# Patient Record
Sex: FEMALE | Race: WHITE | NOT HISPANIC OR LATINO | URBAN - METROPOLITAN AREA
[De-identification: names, ages, dates, MRNs, and addresses within clinical notes are randomized per-mention and may not be internally consistent; named-entity substitution may affect disease eponyms.]

---

## 2018-05-18 ENCOUNTER — ON CAMPUS - OUTPATIENT (OUTPATIENT)
Dept: URBAN - METROPOLITAN AREA HOSPITAL 108 | Facility: HOSPITAL | Age: 83
End: 2018-05-18

## 2018-05-18 DIAGNOSIS — R93.8 ABNORMAL FINDINGS ON DIAGNOSTIC IMAGING OF OTHER SPECIFIED B: ICD-10-CM

## 2018-05-18 DIAGNOSIS — R07.89 OTHER CHEST PAIN: ICD-10-CM

## 2018-05-18 DIAGNOSIS — J98.11 ATELECTASIS: ICD-10-CM

## 2018-05-18 PROCEDURE — 99204 OFFICE O/P NEW MOD 45 MIN: CPT

## 2018-05-19 ENCOUNTER — ON CAMPUS - OUTPATIENT (OUTPATIENT)
Dept: URBAN - METROPOLITAN AREA HOSPITAL 108 | Facility: HOSPITAL | Age: 83
End: 2018-05-19

## 2018-05-19 DIAGNOSIS — R63.4 ABNORMAL WEIGHT LOSS: ICD-10-CM

## 2018-05-19 DIAGNOSIS — R07.89 OTHER CHEST PAIN: ICD-10-CM

## 2018-05-19 DIAGNOSIS — K29.50 UNSPECIFIED CHRONIC GASTRITIS WITHOUT BLEEDING: ICD-10-CM

## 2018-05-19 PROCEDURE — 43239 EGD BIOPSY SINGLE/MULTIPLE: CPT | Performed by: INTERNAL MEDICINE

## 2019-08-08 ENCOUNTER — OFFICE (OUTPATIENT)
Dept: URBAN - METROPOLITAN AREA CLINIC 75 | Facility: CLINIC | Age: 84
End: 2019-08-08

## 2019-08-08 VITALS
DIASTOLIC BLOOD PRESSURE: 77 MMHG | HEIGHT: 61 IN | SYSTOLIC BLOOD PRESSURE: 121 MMHG | HEART RATE: 62 BPM | WEIGHT: 108 LBS

## 2019-08-08 DIAGNOSIS — R19.4 CHANGE IN BOWEL HABIT: ICD-10-CM

## 2019-08-08 DIAGNOSIS — R19.7 DIARRHEA, UNSPECIFIED: ICD-10-CM

## 2019-08-08 DIAGNOSIS — R63.4 ABNORMAL WEIGHT LOSS: ICD-10-CM

## 2019-08-08 DIAGNOSIS — R10.9 UNSPECIFIED ABDOMINAL PAIN: ICD-10-CM

## 2019-08-08 DIAGNOSIS — R19.5 OTHER FECAL ABNORMALITIES: ICD-10-CM

## 2019-08-08 PROCEDURE — 99214 OFFICE O/P EST MOD 30 MIN: CPT | Performed by: NURSE PRACTITIONER

## 2019-08-08 RX ORDER — CHOLESTYRAMINE 4 G/9G
8 POWDER, FOR SUSPENSION ORAL
Qty: 60 | Refills: 12 | Status: COMPLETED
Start: 2019-08-08 | End: 2023-09-15

## 2019-09-29 VITALS
HEIGHT: 61 IN | HEART RATE: 60 BPM | SYSTOLIC BLOOD PRESSURE: 116 MMHG | DIASTOLIC BLOOD PRESSURE: 58 MMHG | WEIGHT: 105 LBS

## 2019-10-02 ENCOUNTER — OFFICE (OUTPATIENT)
Dept: URBAN - METROPOLITAN AREA CLINIC 75 | Facility: CLINIC | Age: 84
End: 2019-10-02

## 2019-10-02 DIAGNOSIS — K59.00 CONSTIPATION, UNSPECIFIED: ICD-10-CM

## 2019-10-02 DIAGNOSIS — I10 ESSENTIAL (PRIMARY) HYPERTENSION: ICD-10-CM

## 2019-10-02 DIAGNOSIS — R63.4 ABNORMAL WEIGHT LOSS: ICD-10-CM

## 2019-10-02 DIAGNOSIS — E87.1 HYPO-OSMOLALITY AND HYPONATREMIA: ICD-10-CM

## 2019-10-02 PROCEDURE — 99213 OFFICE O/P EST LOW 20 MIN: CPT

## 2019-12-16 ENCOUNTER — INPATIENT HOSPITAL (OUTPATIENT)
Dept: URBAN - METROPOLITAN AREA HOSPITAL 107 | Facility: HOSPITAL | Age: 84
End: 2019-12-16
Payer: COMMERCIAL

## 2019-12-16 DIAGNOSIS — R07.89 OTHER CHEST PAIN: ICD-10-CM

## 2019-12-16 DIAGNOSIS — K58.9 IRRITABLE BOWEL SYNDROME WITHOUT DIARRHEA: ICD-10-CM

## 2019-12-16 DIAGNOSIS — R10.9 UNSPECIFIED ABDOMINAL PAIN: ICD-10-CM

## 2019-12-16 DIAGNOSIS — R10.13 EPIGASTRIC PAIN: ICD-10-CM

## 2019-12-16 DIAGNOSIS — R74.8 ABNORMAL LEVELS OF OTHER SERUM ENZYMES: ICD-10-CM

## 2019-12-16 DIAGNOSIS — K85.90 ACUTE PANCREATITIS WITHOUT NECROSIS OR INFECTION, UNSPECIFIE: ICD-10-CM

## 2019-12-16 DIAGNOSIS — K80.50 CALCULUS OF BILE DUCT WITHOUT CHOLANGITIS OR CHOLECYSTITIS W: ICD-10-CM

## 2019-12-16 PROCEDURE — 99222 1ST HOSP IP/OBS MODERATE 55: CPT | Performed by: INTERNAL MEDICINE

## 2019-12-18 ENCOUNTER — INPATIENT HOSPITAL (OUTPATIENT)
Dept: URBAN - METROPOLITAN AREA HOSPITAL 107 | Facility: HOSPITAL | Age: 84
End: 2019-12-18
Payer: COMMERCIAL

## 2019-12-18 DIAGNOSIS — K80.44 CALCULUS OF BILE DUCT WITH CHRONIC CHOLECYSTITIS WITHOUT OBS: ICD-10-CM

## 2019-12-18 DIAGNOSIS — R94.5 ABNORMAL RESULTS OF LIVER FUNCTION STUDIES: ICD-10-CM

## 2019-12-18 DIAGNOSIS — R10.13 EPIGASTRIC PAIN: ICD-10-CM

## 2019-12-18 DIAGNOSIS — R10.11 RIGHT UPPER QUADRANT PAIN: ICD-10-CM

## 2019-12-18 DIAGNOSIS — K85.10 BILIARY ACUTE PANCREATITIS WITHOUT NECROSIS OR INFECTION: ICD-10-CM

## 2019-12-18 PROCEDURE — 43264 ERCP REMOVE DUCT CALCULI: CPT | Performed by: INTERNAL MEDICINE

## 2019-12-18 PROCEDURE — 43262 ENDO CHOLANGIOPANCREATOGRAPH: CPT | Mod: 59 | Performed by: INTERNAL MEDICINE

## 2019-12-19 ENCOUNTER — INPATIENT HOSPITAL (OUTPATIENT)
Dept: URBAN - METROPOLITAN AREA HOSPITAL 107 | Facility: HOSPITAL | Age: 84
End: 2019-12-19
Payer: COMMERCIAL

## 2019-12-19 DIAGNOSIS — K80.44 CALCULUS OF BILE DUCT WITH CHRONIC CHOLECYSTITIS WITHOUT OBS: ICD-10-CM

## 2019-12-19 DIAGNOSIS — I10 ESSENTIAL (PRIMARY) HYPERTENSION: ICD-10-CM

## 2019-12-19 DIAGNOSIS — K85.10 BILIARY ACUTE PANCREATITIS WITHOUT NECROSIS OR INFECTION: ICD-10-CM

## 2019-12-19 DIAGNOSIS — J44.9 CHRONIC OBSTRUCTIVE PULMONARY DISEASE, UNSPECIFIED: ICD-10-CM

## 2019-12-19 PROCEDURE — 99231 SBSQ HOSP IP/OBS SF/LOW 25: CPT | Performed by: PHYSICIAN ASSISTANT

## 2020-01-08 ENCOUNTER — OFFICE (OUTPATIENT)
Dept: URBAN - METROPOLITAN AREA CLINIC 75 | Facility: CLINIC | Age: 85
End: 2020-01-08

## 2020-01-08 VITALS
HEART RATE: 73 BPM | SYSTOLIC BLOOD PRESSURE: 110 MMHG | WEIGHT: 99 LBS | DIASTOLIC BLOOD PRESSURE: 70 MMHG | HEIGHT: 61 IN

## 2020-01-08 DIAGNOSIS — R19.4 CHANGE IN BOWEL HABIT: ICD-10-CM

## 2020-01-08 DIAGNOSIS — Z85.41 PERSONAL HISTORY OF MALIGNANT NEOPLASM OF CERVIX UTERI: ICD-10-CM

## 2020-01-08 DIAGNOSIS — K80.31 CALCULUS OF BILE DUCT WITH CHOLANGITIS, UNSPECIFIED, WITH OB: ICD-10-CM

## 2020-01-08 DIAGNOSIS — R63.4 ABNORMAL WEIGHT LOSS: ICD-10-CM

## 2020-01-08 PROCEDURE — 99213 OFFICE O/P EST LOW 20 MIN: CPT | Performed by: INTERNAL MEDICINE

## 2021-03-04 ENCOUNTER — TRANSCRIBE ORDERS (OUTPATIENT)
Dept: ADMINISTRATIVE | Facility: HOSPITAL | Age: 86
End: 2021-03-04

## 2021-03-04 DIAGNOSIS — D64.9 LOW HEMOGLOBIN: Primary | ICD-10-CM

## 2021-03-04 RX ORDER — FUROSEMIDE 10 MG/ML
20 INJECTION INTRAMUSCULAR; INTRAVENOUS ONCE
Start: 2021-03-08

## 2021-03-08 ENCOUNTER — HOSPITAL ENCOUNTER (OUTPATIENT)
Dept: INFUSION THERAPY | Facility: HOSPITAL | Age: 86
Setting detail: INFUSION SERIES
Discharge: HOME | End: 2021-03-08

## 2021-03-08 VITALS
HEART RATE: 93 BPM | OXYGEN SATURATION: 99 % | TEMPERATURE: 97.5 F | RESPIRATION RATE: 16 BRPM | DIASTOLIC BLOOD PRESSURE: 72 MMHG | SYSTOLIC BLOOD PRESSURE: 141 MMHG

## 2021-03-08 DIAGNOSIS — D64.9 LOW HEMOGLOBIN: ICD-10-CM

## 2021-03-08 LAB
ABO GROUP BLD: NORMAL
BLD GP AB SCN SERPL QL: NEGATIVE
RH BLD: POSITIVE
T&S EXPIRATION DATE: NORMAL

## 2021-03-08 PROCEDURE — 25010000002 FUROSEMIDE PER 20 MG: Performed by: FAMILY MEDICINE

## 2021-03-08 PROCEDURE — 96374 THER/PROPH/DIAG INJ IV PUSH: CPT

## 2021-03-08 PROCEDURE — 86920 COMPATIBILITY TEST SPIN: CPT

## 2021-03-08 PROCEDURE — 86900 BLOOD TYPING SEROLOGIC ABO: CPT

## 2021-03-08 PROCEDURE — 36415 COLL VENOUS BLD VENIPUNCTURE: CPT

## 2021-03-08 PROCEDURE — 36430 TRANSFUSION BLD/BLD COMPNT: CPT

## 2021-03-08 PROCEDURE — P9040 RBC LEUKOREDUCED IRRADIATED: HCPCS

## 2021-03-08 PROCEDURE — 86850 RBC ANTIBODY SCREEN: CPT | Performed by: FAMILY MEDICINE

## 2021-03-08 PROCEDURE — 86900 BLOOD TYPING SEROLOGIC ABO: CPT | Performed by: FAMILY MEDICINE

## 2021-03-08 PROCEDURE — 86901 BLOOD TYPING SEROLOGIC RH(D): CPT | Performed by: FAMILY MEDICINE

## 2021-03-08 PROCEDURE — P9016 RBC LEUKOCYTES REDUCED: HCPCS

## 2021-03-08 RX ORDER — ACETAMINOPHEN 500 MG
1000 TABLET ORAL ONCE
Status: COMPLETED | OUTPATIENT
Start: 2021-03-08 | End: 2021-03-08

## 2021-03-08 RX ORDER — MIRTAZAPINE 15 MG/1
7.5 TABLET, FILM COATED ORAL NIGHTLY
COMMUNITY
End: 2021-05-18 | Stop reason: HOSPADM

## 2021-03-08 RX ORDER — TRAMADOL HYDROCHLORIDE 50 MG/1
50 TABLET ORAL EVERY 8 HOURS PRN
COMMUNITY

## 2021-03-08 RX ORDER — GABAPENTIN 300 MG/1
300 CAPSULE ORAL DAILY
COMMUNITY
End: 2021-05-18 | Stop reason: HOSPADM

## 2021-03-08 RX ORDER — LOSARTAN POTASSIUM 50 MG/1
50 TABLET ORAL 2 TIMES DAILY
COMMUNITY
End: 2021-05-18 | Stop reason: HOSPADM

## 2021-03-08 RX ORDER — ATORVASTATIN CALCIUM 40 MG/1
40 TABLET, FILM COATED ORAL DAILY
COMMUNITY

## 2021-03-08 RX ORDER — PANTOPRAZOLE SODIUM 40 MG/1
40 TABLET, DELAYED RELEASE ORAL DAILY
COMMUNITY

## 2021-03-08 RX ORDER — ASPIRIN 81 MG/1
81 TABLET ORAL DAILY
COMMUNITY

## 2021-03-08 RX ORDER — FUROSEMIDE 10 MG/ML
20 INJECTION INTRAMUSCULAR; INTRAVENOUS ONCE
Status: COMPLETED | OUTPATIENT
Start: 2021-03-08 | End: 2021-03-08

## 2021-03-08 RX ORDER — CARVEDILOL 6.25 MG/1
12.5 TABLET ORAL 2 TIMES DAILY WITH MEALS
COMMUNITY
End: 2021-05-18 | Stop reason: HOSPADM

## 2021-03-08 RX ORDER — LANOLIN ALCOHOL/MO/W.PET/CERES
1000 CREAM (GRAM) TOPICAL DAILY
COMMUNITY

## 2021-03-08 RX ORDER — GLIMEPIRIDE 2 MG/1
1 TABLET ORAL 2 TIMES DAILY
COMMUNITY

## 2021-03-08 RX ORDER — ESTRADIOL 10 UG/1
1 INSERT VAGINAL 2 TIMES WEEKLY
COMMUNITY
End: 2022-11-11

## 2021-03-08 RX ORDER — LATANOPROST 50 UG/ML
1 SOLUTION/ DROPS OPHTHALMIC NIGHTLY
COMMUNITY

## 2021-03-08 RX ORDER — FOLIC ACID 1 MG/1
1 TABLET ORAL DAILY
COMMUNITY

## 2021-03-08 RX ORDER — MULTIPLE VITAMINS W/ MINERALS TAB 9MG-400MCG
1 TAB ORAL DAILY
COMMUNITY

## 2021-03-08 RX ORDER — TRAMADOL HYDROCHLORIDE 50 MG/1
50 TABLET ORAL EVERY 8 HOURS PRN
Status: COMPLETED | OUTPATIENT
Start: 2021-03-08 | End: 2021-03-08

## 2021-03-08 RX ORDER — ACETAMINOPHEN 500 MG
1000 TABLET ORAL EVERY 6 HOURS PRN
Status: ON HOLD | COMMUNITY
End: 2022-01-13 | Stop reason: SDUPTHER

## 2021-03-08 RX ADMIN — ACETAMINOPHEN 1000 MG: 500 TABLET, FILM COATED ORAL at 11:30

## 2021-03-08 RX ADMIN — TRAMADOL HYDROCHLORIDE 50 MG: 50 TABLET, FILM COATED ORAL at 13:42

## 2021-03-08 RX ADMIN — FUROSEMIDE 20 MG: 10 INJECTION, SOLUTION INTRAMUSCULAR; INTRAVENOUS at 12:24

## 2021-03-08 NOTE — PROGRESS NOTES
Patient up with assist x 2 to [facility] x 3. Patient given outpatient ham box and Ginger Ale. Patient tolerated transfusion without s/s of reaction. Patient discharged via w/c per staff to main entrance.  Report called to Mayur Lyle.

## 2021-03-08 NOTE — PATIENT INSTRUCTIONS
"https://www.[facility].org/donate-blood/blood-donation-process/what-happens-to-donated-blood/blood-transfusions/types-of-blood-transfusions.html\"> https://www.hematology.org/education/patients/blood-basics/blood-safety-and-matching\"> https://www.nhlbi.nih.gov/health-topics/blood-transfusion\">   Blood Transfusion, Adult  A blood transfusion is a procedure in which you receive blood or a type of blood cell (blood component) through an IV. You may need a blood transfusion when your blood level is low. This may result from a bleeding disorder, illness, injury, or surgery. The blood may come from a donor. You may also be able to donate blood for yourself (autologous blood donation) before a planned surgery.  The blood given in a transfusion is made up of different blood components. You may receive:  · Red blood cells. These carry oxygen to the cells in the body.  · Platelets. These help your blood to clot.  · Plasma. This is the liquid part of your blood. It carries proteins and other substances throughout the body.  · White blood cells. These help you fight infections.  If you have hemophilia or another clotting disorder, you may also receive other types of blood products.  Tell a health care provider about:  · Any blood disorders you have.  · Any previous reactions you have had during a blood transfusion.  · Any allergies you have.  · All medicines you are taking, including vitamins, herbs, eye drops, creams, and over-the-counter medicines.  · Any surgeries you have had.  · Any medical conditions you have, including any recent fever or cold symptoms.  · Whether you are pregnant or may be pregnant.  What are the risks?  Generally, this is a safe procedure. However, problems may occur.  · The most common problems include:  ? A mild allergic reaction, such as red, swollen areas of skin (hives) and itching.  ? Fever or chills. This may be the body's response to new blood cells received. This may occur during or up to 4 " hours after the transfusion.  · More serious problems may include:  ? Transfusion-associated circulatory overload (TACO), or too much fluid in the lungs. This may cause breathing problems.  ? A serious allergic reaction, such as difficulty breathing or swelling around the face and lips.  ? Transfusion-related acute lung injury (TRALI), which causes breathing difficulty and low oxygen in the blood. This can occur within hours of the transfusion or several days later.  ? Iron overload. This can happen after receiving many blood transfusions over a period of time.  ? Infection or virus being transmitted. This is rare because donated blood is carefully tested before it is given.  ? Hemolytic transfusion reaction. This is rare. It happens when your body's defense system (immune system)tries to attack the new blood cells. Symptoms may include fever, chills, nausea, low blood pressure, and low back or chest pain.  ? Transfusion-associated aaaos-dzuhwu-vepe disease (TAGVHD). This is rare. It happens when donated cells attack your body's healthy tissues.  What happens before the procedure?  Medicines  Ask your health care provider about:  · Changing or stopping your regular medicines. This is especially important if you are taking diabetes medicines or blood thinners.  · Taking medicines such as aspirin and ibuprofen. These medicines can thin your blood. Do not take these medicines unless your health care provider tells you to take them.  · Taking over-the-counter medicines, vitamins, herbs, and supplements.  General instructions  · Follow instructions from your health care provider about eating and drinking restrictions.  · You will have a blood test to determine your blood type. This is necessary to know what kind of blood your body will accept and to match it to the donor blood.  · If you are going to have a planned surgery, you may be able to do an autologous blood donation. This may be done in case you need to have a  transfusion.  · You will have your temperature, blood pressure, and pulse monitored before the transfusion.  · If you have had an allergic reaction to a transfusion in the past, you may be given medicine to help prevent a reaction. This medicine may be given to you by mouth (orally) or through an IV.  · Set aside time for the blood transfusion. This procedure generally takes 1-4 hours to complete.  What happens during the procedure?    · An IV will be inserted into one of your veins.  · The bag of donated blood will be attached to your IV. The blood will then enter through your vein.  · Your temperature, blood pressure, and pulse will be monitored regularly during the transfusion. This monitoring is done to detect early signs of a transfusion reaction.  · Tell your nurse right away if you have any of these symptoms during the transfusion:  ? Shortness of breath or trouble breathing.  ? Chest or back pain.  ? Fever or chills.  ? Hives or itching.  · If you have any signs or symptoms of a reaction, your transfusion will be stopped and you may be given medicine.  · When the transfusion is complete, your IV will be removed.  · Pressure may be applied to the IV site for a few minutes.  · A bandage (dressing)will be applied.  The procedure may vary among health care providers and hospitals.  What happens after the procedure?  · Your temperature, blood pressure, pulse, breathing rate, and blood oxygen level will be monitored until you leave the hospital or clinic.  · Your blood may be tested to see how you are responding to the transfusion.  · You may be warmed with fluids or blankets to maintain a normal body temperature.  · If you receive your blood transfusion in an outpatient setting, you will be told whom to contact to report any reactions.  Where to find more information  For more information on blood transfusions, visit the [facility] [place]: [email or web address]  Summary  · A blood transfusion is a procedure in which you  receive blood or a type of blood cell (blood component) through an IV.  · The blood you receive may come from a donor or be donated by yourself (autologous blood donation) before a planned surgery.  · The blood given in a transfusion is made up of different blood components. You may receive red blood cells, platelets, plasma, or white blood cells depending on the condition treated.  · Your temperature, blood pressure, and pulse will be monitored before, during, and after the transfusion.  · After the transfusion, your blood may be tested to see how your body has responded.  This information is not intended to replace advice given to you by your health care provider. Make sure you discuss any questions you have with your health care provider.  Document Revised: 06/11/2020 Document Reviewed: 06/11/2020  Elsevier Patient Education © 2020 [employer]

## 2021-03-09 LAB
BH BB BLOOD EXPIRATION DATE: NORMAL
BH BB BLOOD EXPIRATION DATE: NORMAL
BH BB BLOOD TYPE BARCODE: 5100
BH BB BLOOD TYPE BARCODE: 5100
BH BB DISPENSE STATUS: NORMAL
BH BB DISPENSE STATUS: NORMAL
BH BB PRODUCT CODE: NORMAL
BH BB PRODUCT CODE: NORMAL
BH BB UNIT NUMBER: NORMAL
BH BB UNIT NUMBER: NORMAL
CROSSMATCH INTERPRETATION: NORMAL
CROSSMATCH INTERPRETATION: NORMAL
UNIT  ABO: NORMAL
UNIT  ABO: NORMAL
UNIT  RH: NORMAL
UNIT  RH: NORMAL

## 2021-05-12 ENCOUNTER — APPOINTMENT (OUTPATIENT)
Dept: CT IMAGING | Facility: HOSPITAL | Age: 86
End: 2021-05-12

## 2021-05-12 ENCOUNTER — HOSPITAL ENCOUNTER (OUTPATIENT)
Facility: HOSPITAL | Age: 86
Setting detail: OBSERVATION
Discharge: HOME | End: 2021-05-18
Attending: HOSPITALIST | Admitting: HOSPITALIST

## 2021-05-12 PROBLEM — K21.9 GERD WITHOUT ESOPHAGITIS: Status: ACTIVE | Noted: 2021-05-12

## 2021-05-12 PROBLEM — I10 ESSENTIAL HYPERTENSION: Status: ACTIVE | Noted: 2021-05-12

## 2021-05-12 PROBLEM — R07.9 CHEST PAIN: Status: ACTIVE | Noted: 2021-05-12

## 2021-05-12 PROBLEM — E78.00 ELEVATED CHOLESTEROL: Status: ACTIVE | Noted: 2021-05-12

## 2021-05-12 PROBLEM — R19.7 DIARRHEA OF PRESUMED INFECTIOUS ORIGIN: Status: ACTIVE | Noted: 2021-05-12

## 2021-05-12 PROBLEM — Z85.41 HISTORY OF CERVICAL CANCER: Status: ACTIVE | Noted: 2021-05-12

## 2021-05-12 PROBLEM — Z87.19 HISTORY OF PANCREATITIS: Status: ACTIVE | Noted: 2021-05-12

## 2021-05-12 LAB
ALBUMIN SERPL-MCNC: 3.2 G/DL (ref 3.5–5.2)
ALBUMIN/GLOB SERPL: 1.3 G/DL
ALP SERPL-CCNC: 78 U/L (ref 39–117)
ALT SERPL W P-5'-P-CCNC: 9 U/L (ref 1–33)
ANION GAP SERPL CALCULATED.3IONS-SCNC: 11.8 MMOL/L (ref 5–15)
AST SERPL-CCNC: 17 U/L (ref 1–32)
BASOPHILS # BLD AUTO: 0.01 10*3/MM3 (ref 0–0.2)
BASOPHILS NFR BLD AUTO: 0.1 % (ref 0–1.5)
BILIRUB SERPL-MCNC: 0.2 MG/DL (ref 0–1.2)
BUN SERPL-MCNC: 8 MG/DL (ref 8–23)
BUN/CREAT SERPL: 19.5 (ref 7–25)
CALCIUM SPEC-SCNC: 8.6 MG/DL (ref 8.2–9.6)
CHLORIDE SERPL-SCNC: 99 MMOL/L (ref 98–107)
CO2 SERPL-SCNC: 25.2 MMOL/L (ref 22–29)
CREAT SERPL-MCNC: 0.41 MG/DL (ref 0.57–1)
DEPRECATED RDW RBC AUTO: 53.5 FL (ref 37–54)
EOSINOPHIL # BLD AUTO: 0.01 10*3/MM3 (ref 0–0.4)
EOSINOPHIL NFR BLD AUTO: 0.1 % (ref 0.3–6.2)
ERYTHROCYTE [DISTWIDTH] IN BLOOD BY AUTOMATED COUNT: 15 % (ref 12.3–15.4)
GFR SERPL CREATININE-BSD FRML MDRD: 146 ML/MIN/1.73
GLOBULIN UR ELPH-MCNC: 2.4 GM/DL
GLUCOSE SERPL-MCNC: 89 MG/DL (ref 65–99)
HCT VFR BLD AUTO: 32.6 % (ref 34–46.6)
HGB BLD-MCNC: 11.3 G/DL (ref 12–15.9)
IMM GRANULOCYTES # BLD AUTO: 0.04 10*3/MM3 (ref 0–0.05)
IMM GRANULOCYTES NFR BLD AUTO: 0.4 % (ref 0–0.5)
LYMPHOCYTES # BLD AUTO: 0.5 10*3/MM3 (ref 0.7–3.1)
LYMPHOCYTES NFR BLD AUTO: 5.6 % (ref 19.6–45.3)
MCH RBC QN AUTO: 33.7 PG (ref 26.6–33)
MCHC RBC AUTO-ENTMCNC: 34.7 G/DL (ref 31.5–35.7)
MCV RBC AUTO: 97.3 FL (ref 79–97)
MONOCYTES # BLD AUTO: 0.66 10*3/MM3 (ref 0.1–0.9)
MONOCYTES NFR BLD AUTO: 7.4 % (ref 5–12)
NEUTROPHILS NFR BLD AUTO: 7.75 10*3/MM3 (ref 1.7–7)
NEUTROPHILS NFR BLD AUTO: 86.4 % (ref 42.7–76)
NRBC BLD AUTO-RTO: 0 /100 WBC (ref 0–0.2)
PLATELET # BLD AUTO: 282 10*3/MM3 (ref 140–450)
PMV BLD AUTO: 9.3 FL (ref 6–12)
POTASSIUM SERPL-SCNC: 3.4 MMOL/L (ref 3.5–5.2)
PROCALCITONIN SERPL-MCNC: 0.1 NG/ML (ref 0–0.25)
PROT SERPL-MCNC: 5.6 G/DL (ref 6–8.5)
QT INTERVAL: 344 MS
RBC # BLD AUTO: 3.35 10*6/MM3 (ref 3.77–5.28)
SARS-COV-2 N GENE NPH QL NAA+PROBE: NOT DETECTED
SODIUM SERPL-SCNC: 136 MMOL/L (ref 136–145)
TROPONIN T SERPL-MCNC: <0.01 NG/ML (ref 0–0.03)
WBC # BLD AUTO: 8.97 10*3/MM3 (ref 3.4–10.8)

## 2021-05-12 PROCEDURE — 96361 HYDRATE IV INFUSION ADD-ON: CPT

## 2021-05-12 PROCEDURE — 83735 ASSAY OF MAGNESIUM: CPT | Performed by: NURSE PRACTITIONER

## 2021-05-12 PROCEDURE — U0003 INFECTIOUS AGENT DETECTION BY NUCLEIC ACID (DNA OR RNA); SEVERE ACUTE RESPIRATORY SYNDROME CORONAVIRUS 2 (SARS-COV-2) (CORONAVIRUS DISEASE [COVID-19]), AMPLIFIED PROBE TECHNIQUE, MAKING USE OF HIGH THROUGHPUT TECHNOLOGIES AS DESCRIBED BY CMS-2020-01-R: HCPCS | Performed by: HOSPITALIST

## 2021-05-12 PROCEDURE — 93010 ELECTROCARDIOGRAM REPORT: CPT | Performed by: INTERNAL MEDICINE

## 2021-05-12 PROCEDURE — 93005 ELECTROCARDIOGRAM TRACING: CPT | Performed by: NURSE PRACTITIONER

## 2021-05-12 PROCEDURE — G0378 HOSPITAL OBSERVATION PER HR: HCPCS

## 2021-05-12 PROCEDURE — 80053 COMPREHEN METABOLIC PANEL: CPT | Performed by: HOSPITALIST

## 2021-05-12 PROCEDURE — C9803 HOPD COVID-19 SPEC COLLECT: HCPCS

## 2021-05-12 PROCEDURE — 84145 PROCALCITONIN (PCT): CPT | Performed by: HOSPITALIST

## 2021-05-12 PROCEDURE — 85025 COMPLETE CBC W/AUTO DIFF WBC: CPT | Performed by: HOSPITALIST

## 2021-05-12 PROCEDURE — 84132 ASSAY OF SERUM POTASSIUM: CPT | Performed by: NURSE PRACTITIONER

## 2021-05-12 PROCEDURE — 74176 CT ABD & PELVIS W/O CONTRAST: CPT

## 2021-05-12 PROCEDURE — 84484 ASSAY OF TROPONIN QUANT: CPT | Performed by: HOSPITALIST

## 2021-05-12 RX ORDER — CHOLECALCIFEROL (VITAMIN D3) 125 MCG
1000 CAPSULE ORAL DAILY
Status: DISCONTINUED | OUTPATIENT
Start: 2021-05-12 | End: 2021-05-18 | Stop reason: HOSPADM

## 2021-05-12 RX ORDER — FOLIC ACID 1 MG/1
1 TABLET ORAL DAILY
Status: DISCONTINUED | OUTPATIENT
Start: 2021-05-12 | End: 2021-05-18 | Stop reason: HOSPADM

## 2021-05-12 RX ORDER — MAGNESIUM SULFATE HEPTAHYDRATE 40 MG/ML
2 INJECTION, SOLUTION INTRAVENOUS AS NEEDED
Status: DISCONTINUED | OUTPATIENT
Start: 2021-05-12 | End: 2021-05-18 | Stop reason: HOSPADM

## 2021-05-12 RX ORDER — TRAMADOL HYDROCHLORIDE 50 MG/1
50 TABLET ORAL EVERY 8 HOURS PRN
Status: DISCONTINUED | OUTPATIENT
Start: 2021-05-12 | End: 2021-05-18 | Stop reason: HOSPADM

## 2021-05-12 RX ORDER — SODIUM CHLORIDE 9 MG/ML
100 INJECTION, SOLUTION INTRAVENOUS CONTINUOUS
Status: DISCONTINUED | OUTPATIENT
Start: 2021-05-12 | End: 2021-05-17

## 2021-05-12 RX ORDER — MAGNESIUM SULFATE 1 G/100ML
1 INJECTION INTRAVENOUS AS NEEDED
Status: DISCONTINUED | OUTPATIENT
Start: 2021-05-12 | End: 2021-05-18 | Stop reason: HOSPADM

## 2021-05-12 RX ORDER — ACETAMINOPHEN 500 MG
1000 TABLET ORAL EVERY 6 HOURS PRN
Status: DISCONTINUED | OUTPATIENT
Start: 2021-05-12 | End: 2021-05-18

## 2021-05-12 RX ORDER — GLIMEPIRIDE 2 MG/1
1 TABLET ORAL DAILY
Status: DISCONTINUED | OUTPATIENT
Start: 2021-05-12 | End: 2021-05-18 | Stop reason: HOSPADM

## 2021-05-12 RX ORDER — ONDANSETRON 2 MG/ML
4 INJECTION INTRAMUSCULAR; INTRAVENOUS EVERY 6 HOURS PRN
Status: DISCONTINUED | OUTPATIENT
Start: 2021-05-12 | End: 2021-05-18 | Stop reason: HOSPADM

## 2021-05-12 RX ORDER — CARVEDILOL 12.5 MG/1
12.5 TABLET ORAL 2 TIMES DAILY WITH MEALS
Status: DISCONTINUED | OUTPATIENT
Start: 2021-05-12 | End: 2021-05-17

## 2021-05-12 RX ORDER — MAGNESIUM SULFATE HEPTAHYDRATE 40 MG/ML
4 INJECTION, SOLUTION INTRAVENOUS AS NEEDED
Status: DISCONTINUED | OUTPATIENT
Start: 2021-05-12 | End: 2021-05-18 | Stop reason: HOSPADM

## 2021-05-12 RX ORDER — ASPIRIN 81 MG/1
81 TABLET ORAL DAILY
Status: DISCONTINUED | OUTPATIENT
Start: 2021-05-12 | End: 2021-05-18 | Stop reason: HOSPADM

## 2021-05-12 RX ORDER — ACETAMINOPHEN 160 MG/5ML
650 SOLUTION ORAL EVERY 4 HOURS PRN
Status: DISCONTINUED | OUTPATIENT
Start: 2021-05-12 | End: 2021-05-18 | Stop reason: HOSPADM

## 2021-05-12 RX ORDER — ACETAMINOPHEN 325 MG/1
650 TABLET ORAL EVERY 4 HOURS PRN
Status: DISCONTINUED | OUTPATIENT
Start: 2021-05-12 | End: 2021-05-18 | Stop reason: HOSPADM

## 2021-05-12 RX ORDER — FAMOTIDINE 20 MG/1
20 TABLET, FILM COATED ORAL
Status: DISCONTINUED | OUTPATIENT
Start: 2021-05-12 | End: 2021-05-15

## 2021-05-12 RX ORDER — LATANOPROST 50 UG/ML
1 SOLUTION/ DROPS OPHTHALMIC NIGHTLY
Status: DISCONTINUED | OUTPATIENT
Start: 2021-05-12 | End: 2021-05-18 | Stop reason: HOSPADM

## 2021-05-12 RX ORDER — ATORVASTATIN CALCIUM 20 MG/1
40 TABLET, FILM COATED ORAL DAILY
Status: DISCONTINUED | OUTPATIENT
Start: 2021-05-12 | End: 2021-05-18 | Stop reason: HOSPADM

## 2021-05-12 RX ORDER — ACETAMINOPHEN 650 MG/1
650 SUPPOSITORY RECTAL EVERY 4 HOURS PRN
Status: DISCONTINUED | OUTPATIENT
Start: 2021-05-12 | End: 2021-05-18 | Stop reason: HOSPADM

## 2021-05-12 RX ORDER — ONDANSETRON 4 MG/1
4 TABLET, FILM COATED ORAL EVERY 6 HOURS PRN
Status: DISCONTINUED | OUTPATIENT
Start: 2021-05-12 | End: 2021-05-18 | Stop reason: HOSPADM

## 2021-05-12 RX ORDER — NITROGLYCERIN 0.4 MG/1
0.4 TABLET SUBLINGUAL
Status: DISCONTINUED | OUTPATIENT
Start: 2021-05-12 | End: 2021-05-18 | Stop reason: HOSPADM

## 2021-05-12 RX ORDER — POTASSIUM CHLORIDE 1.5 G/1.77G
40 POWDER, FOR SOLUTION ORAL AS NEEDED
Status: DISCONTINUED | OUTPATIENT
Start: 2021-05-12 | End: 2021-05-13

## 2021-05-12 RX ORDER — POTASSIUM CHLORIDE 750 MG/1
40 TABLET, FILM COATED, EXTENDED RELEASE ORAL AS NEEDED
Status: DISCONTINUED | OUTPATIENT
Start: 2021-05-12 | End: 2021-05-14

## 2021-05-12 RX ORDER — MULTIPLE VITAMINS W/ MINERALS TAB 9MG-400MCG
1 TAB ORAL DAILY
Status: DISCONTINUED | OUTPATIENT
Start: 2021-05-12 | End: 2021-05-18 | Stop reason: HOSPADM

## 2021-05-12 RX ADMIN — TIMOLOL MALEATE 1 DROP: 2.5 SOLUTION OPHTHALMIC at 17:36

## 2021-05-12 RX ADMIN — LATANOPROST 1 DROP: 50 SOLUTION/ DROPS OPHTHALMIC at 20:55

## 2021-05-12 RX ADMIN — CARVEDILOL 12.5 MG: 12.5 TABLET, FILM COATED ORAL at 17:36

## 2021-05-12 RX ADMIN — SODIUM CHLORIDE 100 ML/HR: 9 INJECTION, SOLUTION INTRAVENOUS at 20:55

## 2021-05-12 RX ADMIN — FAMOTIDINE 20 MG: 20 TABLET, FILM COATED ORAL at 17:36

## 2021-05-13 PROBLEM — K52.9 ENTERITIS: Status: ACTIVE | Noted: 2021-05-13

## 2021-05-13 PROBLEM — E87.6 HYPOKALEMIA: Status: ACTIVE | Noted: 2021-05-13

## 2021-05-13 PROBLEM — E83.42 HYPOMAGNESEMIA: Status: ACTIVE | Noted: 2021-05-13

## 2021-05-13 PROBLEM — R19.7 DIARRHEA: Status: ACTIVE | Noted: 2021-05-13

## 2021-05-13 LAB
ADV 40+41 DNA STL QL NAA+NON-PROBE: NOT DETECTED
ANION GAP SERPL CALCULATED.3IONS-SCNC: 6.6 MMOL/L (ref 5–15)
ASTRO TYP 1-8 RNA STL QL NAA+NON-PROBE: NOT DETECTED
BUN SERPL-MCNC: 3 MG/DL (ref 8–23)
BUN/CREAT SERPL: 13 (ref 7–25)
C CAYETANENSIS DNA STL QL NAA+NON-PROBE: NOT DETECTED
C COLI+JEJ+UPSA DNA STL QL NAA+NON-PROBE: NOT DETECTED
C DIFF TOX GENS STL QL NAA+PROBE: NEGATIVE
CALCIUM SPEC-SCNC: 6.3 MG/DL (ref 8.2–9.6)
CHLORIDE SERPL-SCNC: 113 MMOL/L (ref 98–107)
CO2 SERPL-SCNC: 20.4 MMOL/L (ref 22–29)
CREAT SERPL-MCNC: 0.23 MG/DL (ref 0.57–1)
CRYPTOSP DNA STL QL NAA+NON-PROBE: NOT DETECTED
DEPRECATED RDW RBC AUTO: 58.3 FL (ref 37–54)
E HISTOLYT DNA STL QL NAA+NON-PROBE: NOT DETECTED
EAEC PAA PLAS AGGR+AATA ST NAA+NON-PRB: NOT DETECTED
EC STX1+STX2 GENES STL QL NAA+NON-PROBE: NOT DETECTED
EPEC EAE GENE STL QL NAA+NON-PROBE: NOT DETECTED
ERYTHROCYTE [DISTWIDTH] IN BLOOD BY AUTOMATED COUNT: 15.4 % (ref 12.3–15.4)
ETEC LTA+ST1A+ST1B TOX ST NAA+NON-PROBE: NOT DETECTED
G LAMBLIA DNA STL QL NAA+NON-PROBE: NOT DETECTED
GFR SERPL CREATININE-BSD FRML MDRD: >150 ML/MIN/1.73
GLUCOSE SERPL-MCNC: 63 MG/DL (ref 65–99)
HCT VFR BLD AUTO: 30 % (ref 34–46.6)
HGB BLD-MCNC: 9.7 G/DL (ref 12–15.9)
MAGNESIUM SERPL-MCNC: 1.1 MG/DL (ref 1.6–2.4)
MAGNESIUM SERPL-MCNC: 2 MG/DL (ref 1.6–2.4)
MCH RBC QN AUTO: 33 PG (ref 26.6–33)
MCHC RBC AUTO-ENTMCNC: 32.3 G/DL (ref 31.5–35.7)
MCV RBC AUTO: 102 FL (ref 79–97)
NOROVIRUS GI+II RNA STL QL NAA+NON-PROBE: NOT DETECTED
P SHIGELLOIDES DNA STL QL NAA+NON-PROBE: NOT DETECTED
PLATELET # BLD AUTO: 198 10*3/MM3 (ref 140–450)
PMV BLD AUTO: 9.1 FL (ref 6–12)
POTASSIUM SERPL-SCNC: 3.2 MMOL/L (ref 3.5–5.2)
POTASSIUM SERPL-SCNC: 3.3 MMOL/L (ref 3.5–5.2)
POTASSIUM SERPL-SCNC: 3.5 MMOL/L (ref 3.5–5.2)
POTASSIUM SERPL-SCNC: 5.7 MMOL/L (ref 3.5–5.2)
QT INTERVAL: 359 MS
RBC # BLD AUTO: 2.94 10*6/MM3 (ref 3.77–5.28)
RVA RNA STL QL NAA+NON-PROBE: NOT DETECTED
S ENT+BONG DNA STL QL NAA+NON-PROBE: NOT DETECTED
SAPO I+II+IV+V RNA STL QL NAA+NON-PROBE: NOT DETECTED
SHIGELLA SP+EIEC IPAH ST NAA+NON-PROBE: NOT DETECTED
SODIUM SERPL-SCNC: 140 MMOL/L (ref 136–145)
V CHOL+PARA+VUL DNA STL QL NAA+NON-PROBE: NOT DETECTED
V CHOLERAE DNA STL QL NAA+NON-PROBE: NOT DETECTED
WBC # BLD AUTO: 5.75 10*3/MM3 (ref 3.4–10.8)
Y ENTEROCOL DNA STL QL NAA+NON-PROBE: NOT DETECTED

## 2021-05-13 PROCEDURE — 25010000003 MAGNESIUM SULFATE 4 GM/100ML SOLUTION: Performed by: HOSPITALIST

## 2021-05-13 PROCEDURE — 97161 PT EVAL LOW COMPLEX 20 MIN: CPT

## 2021-05-13 PROCEDURE — 99204 OFFICE O/P NEW MOD 45 MIN: CPT | Performed by: INTERNAL MEDICINE

## 2021-05-13 PROCEDURE — 96361 HYDRATE IV INFUSION ADD-ON: CPT

## 2021-05-13 PROCEDURE — 80048 BASIC METABOLIC PNL TOTAL CA: CPT | Performed by: NURSE PRACTITIONER

## 2021-05-13 PROCEDURE — G0378 HOSPITAL OBSERVATION PER HR: HCPCS

## 2021-05-13 PROCEDURE — 96365 THER/PROPH/DIAG IV INF INIT: CPT

## 2021-05-13 PROCEDURE — 85027 COMPLETE CBC AUTOMATED: CPT | Performed by: NURSE PRACTITIONER

## 2021-05-13 PROCEDURE — 97110 THERAPEUTIC EXERCISES: CPT

## 2021-05-13 PROCEDURE — 93010 ELECTROCARDIOGRAM REPORT: CPT | Performed by: INTERNAL MEDICINE

## 2021-05-13 PROCEDURE — 97535 SELF CARE MNGMENT TRAINING: CPT

## 2021-05-13 PROCEDURE — 0097U HC BIOFIRE FILMARRAY GI PANEL: CPT | Performed by: HOSPITALIST

## 2021-05-13 PROCEDURE — 96366 THER/PROPH/DIAG IV INF ADDON: CPT

## 2021-05-13 PROCEDURE — 83735 ASSAY OF MAGNESIUM: CPT | Performed by: HOSPITALIST

## 2021-05-13 PROCEDURE — 87493 C DIFF AMPLIFIED PROBE: CPT | Performed by: HOSPITALIST

## 2021-05-13 PROCEDURE — 84132 ASSAY OF SERUM POTASSIUM: CPT | Performed by: HOSPITALIST

## 2021-05-13 PROCEDURE — 93005 ELECTROCARDIOGRAM TRACING: CPT | Performed by: NURSE PRACTITIONER

## 2021-05-13 PROCEDURE — 97165 OT EVAL LOW COMPLEX 30 MIN: CPT

## 2021-05-13 RX ORDER — LOPERAMIDE HYDROCHLORIDE 2 MG/1
2 CAPSULE ORAL ONCE
Status: COMPLETED | OUTPATIENT
Start: 2021-05-13 | End: 2021-05-13

## 2021-05-13 RX ORDER — LOSARTAN POTASSIUM 50 MG/1
50 TABLET ORAL 2 TIMES DAILY
Status: DISCONTINUED | OUTPATIENT
Start: 2021-05-13 | End: 2021-05-14

## 2021-05-13 RX ADMIN — LOSARTAN POTASSIUM 50 MG: 50 TABLET, FILM COATED ORAL at 21:44

## 2021-05-13 RX ADMIN — Medication 1000 MCG: at 09:30

## 2021-05-13 RX ADMIN — TRAMADOL HYDROCHLORIDE 50 MG: 50 TABLET ORAL at 17:10

## 2021-05-13 RX ADMIN — LOPERAMIDE HYDROCHLORIDE 2 MG: 2 CAPSULE ORAL at 17:10

## 2021-05-13 RX ADMIN — FAMOTIDINE 20 MG: 20 TABLET, FILM COATED ORAL at 06:38

## 2021-05-13 RX ADMIN — FOLIC ACID 1 MG: 1 TABLET ORAL at 09:30

## 2021-05-13 RX ADMIN — TIMOLOL MALEATE 1 DROP: 2.5 SOLUTION OPHTHALMIC at 09:32

## 2021-05-13 RX ADMIN — POTASSIUM CHLORIDE 40 MEQ: 750 TABLET, EXTENDED RELEASE ORAL at 12:09

## 2021-05-13 RX ADMIN — SODIUM CHLORIDE 100 ML/HR: 9 INJECTION, SOLUTION INTRAVENOUS at 06:38

## 2021-05-13 RX ADMIN — CARVEDILOL 12.5 MG: 12.5 TABLET, FILM COATED ORAL at 09:30

## 2021-05-13 RX ADMIN — ACETAMINOPHEN 1000 MG: 500 TABLET, FILM COATED ORAL at 17:10

## 2021-05-13 RX ADMIN — ASPIRIN 81 MG: 81 TABLET, COATED ORAL at 09:30

## 2021-05-13 RX ADMIN — POTASSIUM CHLORIDE 40 MEQ: 750 TABLET, EXTENDED RELEASE ORAL at 16:00

## 2021-05-13 RX ADMIN — LATANOPROST 1 DROP: 50 SOLUTION/ DROPS OPHTHALMIC at 21:46

## 2021-05-13 RX ADMIN — ATORVASTATIN CALCIUM 40 MG: 20 TABLET, FILM COATED ORAL at 09:30

## 2021-05-13 RX ADMIN — FAMOTIDINE 20 MG: 20 TABLET, FILM COATED ORAL at 17:10

## 2021-05-13 RX ADMIN — POTASSIUM CHLORIDE 40 MEQ: 750 TABLET, EXTENDED RELEASE ORAL at 06:42

## 2021-05-13 RX ADMIN — CARVEDILOL 12.5 MG: 12.5 TABLET, FILM COATED ORAL at 17:10

## 2021-05-13 RX ADMIN — SODIUM CHLORIDE 100 ML/HR: 9 INJECTION, SOLUTION INTRAVENOUS at 17:11

## 2021-05-13 RX ADMIN — MAGNESIUM SULFATE 4 G: 4 INJECTION INTRAVENOUS at 01:02

## 2021-05-13 RX ADMIN — POTASSIUM CHLORIDE 40 MEQ: 750 TABLET, EXTENDED RELEASE ORAL at 00:35

## 2021-05-13 RX ADMIN — MULTIPLE VITAMINS W/ MINERALS TAB 1 TABLET: TAB at 09:30

## 2021-05-14 ENCOUNTER — APPOINTMENT (OUTPATIENT)
Dept: MRI IMAGING | Facility: HOSPITAL | Age: 86
End: 2021-05-14

## 2021-05-14 LAB
ANION GAP SERPL CALCULATED.3IONS-SCNC: 3.7 MMOL/L (ref 5–15)
ANION GAP SERPL CALCULATED.3IONS-SCNC: 3.9 MMOL/L (ref 5–15)
BASOPHILS # BLD AUTO: 0.01 10*3/MM3 (ref 0–0.2)
BASOPHILS NFR BLD AUTO: 0.2 % (ref 0–1.5)
BUN SERPL-MCNC: 4 MG/DL (ref 8–23)
BUN SERPL-MCNC: 4 MG/DL (ref 8–23)
BUN/CREAT SERPL: 12.9 (ref 7–25)
BUN/CREAT SERPL: 13.8 (ref 7–25)
CALCIUM SPEC-SCNC: 7.6 MG/DL (ref 8.2–9.6)
CALCIUM SPEC-SCNC: 7.8 MG/DL (ref 8.2–9.6)
CHLORIDE SERPL-SCNC: 109 MMOL/L (ref 98–107)
CHLORIDE SERPL-SCNC: 110 MMOL/L (ref 98–107)
CO2 SERPL-SCNC: 23.1 MMOL/L (ref 22–29)
CO2 SERPL-SCNC: 23.3 MMOL/L (ref 22–29)
CREAT SERPL-MCNC: 0.29 MG/DL (ref 0.57–1)
CREAT SERPL-MCNC: 0.31 MG/DL (ref 0.57–1)
DEPRECATED RDW RBC AUTO: 54 FL (ref 37–54)
EOSINOPHIL # BLD AUTO: 0.04 10*3/MM3 (ref 0–0.4)
EOSINOPHIL NFR BLD AUTO: 0.8 % (ref 0.3–6.2)
ERYTHROCYTE [DISTWIDTH] IN BLOOD BY AUTOMATED COUNT: 14.7 % (ref 12.3–15.4)
FERRITIN SERPL-MCNC: 304 NG/ML (ref 13–150)
GFR SERPL CREATININE-BSD FRML MDRD: >150 ML/MIN/1.73
GFR SERPL CREATININE-BSD FRML MDRD: >150 ML/MIN/1.73
GLUCOSE SERPL-MCNC: 79 MG/DL (ref 65–99)
GLUCOSE SERPL-MCNC: 81 MG/DL (ref 65–99)
HCT VFR BLD AUTO: 32.6 % (ref 34–46.6)
HGB BLD-MCNC: 10.7 G/DL (ref 12–15.9)
IMM GRANULOCYTES # BLD AUTO: 0.04 10*3/MM3 (ref 0–0.05)
IMM GRANULOCYTES NFR BLD AUTO: 0.8 % (ref 0–0.5)
IRON 24H UR-MRATE: 29 MCG/DL (ref 37–145)
IRON SATN MFR SERPL: 13 % (ref 20–50)
LYMPHOCYTES # BLD AUTO: 0.48 10*3/MM3 (ref 0.7–3.1)
LYMPHOCYTES NFR BLD AUTO: 9.4 % (ref 19.6–45.3)
MAGNESIUM SERPL-MCNC: 1.7 MG/DL (ref 1.6–2.4)
MCH RBC QN AUTO: 32.7 PG (ref 26.6–33)
MCHC RBC AUTO-ENTMCNC: 32.8 G/DL (ref 31.5–35.7)
MCV RBC AUTO: 99.7 FL (ref 79–97)
MONOCYTES # BLD AUTO: 0.63 10*3/MM3 (ref 0.1–0.9)
MONOCYTES NFR BLD AUTO: 12.3 % (ref 5–12)
NEUTROPHILS NFR BLD AUTO: 3.92 10*3/MM3 (ref 1.7–7)
NEUTROPHILS NFR BLD AUTO: 76.5 % (ref 42.7–76)
NRBC BLD AUTO-RTO: 0 /100 WBC (ref 0–0.2)
PLATELET # BLD AUTO: 235 10*3/MM3 (ref 140–450)
PMV BLD AUTO: 9.5 FL (ref 6–12)
POTASSIUM SERPL-SCNC: 5.1 MMOL/L (ref 3.5–5.2)
POTASSIUM SERPL-SCNC: 5.6 MMOL/L (ref 3.5–5.2)
QT INTERVAL: 364 MS
RBC # BLD AUTO: 3.27 10*6/MM3 (ref 3.77–5.28)
SODIUM SERPL-SCNC: 136 MMOL/L (ref 136–145)
SODIUM SERPL-SCNC: 137 MMOL/L (ref 136–145)
TIBC SERPL-MCNC: 229 MCG/DL (ref 298–536)
TRANSFERRIN SERPL-MCNC: 154 MG/DL (ref 200–360)
TSH SERPL DL<=0.05 MIU/L-ACNC: 0.84 UIU/ML (ref 0.27–4.2)
VIT B12 BLD-MCNC: 1704 PG/ML (ref 211–946)
WBC # BLD AUTO: 5.12 10*3/MM3 (ref 3.4–10.8)

## 2021-05-14 PROCEDURE — 99213 OFFICE O/P EST LOW 20 MIN: CPT | Performed by: INTERNAL MEDICINE

## 2021-05-14 PROCEDURE — G0378 HOSPITAL OBSERVATION PER HR: HCPCS

## 2021-05-14 PROCEDURE — 82728 ASSAY OF FERRITIN: CPT | Performed by: INTERNAL MEDICINE

## 2021-05-14 PROCEDURE — 85025 COMPLETE CBC W/AUTO DIFF WBC: CPT | Performed by: INTERNAL MEDICINE

## 2021-05-14 PROCEDURE — 80048 BASIC METABOLIC PNL TOTAL CA: CPT | Performed by: NURSE PRACTITIONER

## 2021-05-14 PROCEDURE — 82607 VITAMIN B-12: CPT | Performed by: INTERNAL MEDICINE

## 2021-05-14 PROCEDURE — 84443 ASSAY THYROID STIM HORMONE: CPT | Performed by: INTERNAL MEDICINE

## 2021-05-14 PROCEDURE — 0 GADOBENATE DIMEGLUMINE 529 MG/ML SOLUTION: Performed by: HOSPITALIST

## 2021-05-14 PROCEDURE — 96361 HYDRATE IV INFUSION ADD-ON: CPT

## 2021-05-14 PROCEDURE — 99214 OFFICE O/P EST MOD 30 MIN: CPT | Performed by: NURSE PRACTITIONER

## 2021-05-14 PROCEDURE — 74183 MRI ABD W/O CNTR FLWD CNTR: CPT

## 2021-05-14 PROCEDURE — 97116 GAIT TRAINING THERAPY: CPT

## 2021-05-14 PROCEDURE — A9577 INJ MULTIHANCE: HCPCS | Performed by: HOSPITALIST

## 2021-05-14 PROCEDURE — 83540 ASSAY OF IRON: CPT | Performed by: INTERNAL MEDICINE

## 2021-05-14 PROCEDURE — 85014 HEMATOCRIT: CPT | Performed by: INTERNAL MEDICINE

## 2021-05-14 PROCEDURE — 83735 ASSAY OF MAGNESIUM: CPT | Performed by: HOSPITALIST

## 2021-05-14 PROCEDURE — 84466 ASSAY OF TRANSFERRIN: CPT | Performed by: INTERNAL MEDICINE

## 2021-05-14 PROCEDURE — 82747 ASSAY OF FOLIC ACID RBC: CPT | Performed by: INTERNAL MEDICINE

## 2021-05-14 RX ORDER — METRONIDAZOLE 500 MG/1
500 TABLET ORAL EVERY 8 HOURS SCHEDULED
Status: DISCONTINUED | OUTPATIENT
Start: 2021-05-14 | End: 2021-05-16

## 2021-05-14 RX ORDER — CARVEDILOL 25 MG/1
25 TABLET ORAL 2 TIMES DAILY WITH MEALS
Status: CANCELLED | OUTPATIENT
Start: 2021-05-14

## 2021-05-14 RX ORDER — UREA 10 %
3 LOTION (ML) TOPICAL NIGHTLY PRN
Status: DISCONTINUED | OUTPATIENT
Start: 2021-05-14 | End: 2021-05-18 | Stop reason: HOSPADM

## 2021-05-14 RX ORDER — AMLODIPINE BESYLATE 5 MG/1
5 TABLET ORAL
Status: DISCONTINUED | OUTPATIENT
Start: 2021-05-14 | End: 2021-05-18 | Stop reason: HOSPADM

## 2021-05-14 RX ADMIN — ASPIRIN 81 MG: 81 TABLET, COATED ORAL at 08:36

## 2021-05-14 RX ADMIN — METRONIDAZOLE 500 MG: 500 TABLET, FILM COATED ORAL at 20:51

## 2021-05-14 RX ADMIN — GADOBENATE DIMEGLUMINE 7 ML: 529 INJECTION, SOLUTION INTRAVENOUS at 19:45

## 2021-05-14 RX ADMIN — Medication 3 MG: at 00:50

## 2021-05-14 RX ADMIN — FAMOTIDINE 20 MG: 20 TABLET, FILM COATED ORAL at 06:33

## 2021-05-14 RX ADMIN — ACETAMINOPHEN 1000 MG: 500 TABLET, FILM COATED ORAL at 20:56

## 2021-05-14 RX ADMIN — TRAMADOL HYDROCHLORIDE 50 MG: 50 TABLET ORAL at 20:56

## 2021-05-14 RX ADMIN — ATORVASTATIN CALCIUM 40 MG: 20 TABLET, FILM COATED ORAL at 08:36

## 2021-05-14 RX ADMIN — FOLIC ACID 1 MG: 1 TABLET ORAL at 08:36

## 2021-05-14 RX ADMIN — CARVEDILOL 12.5 MG: 12.5 TABLET, FILM COATED ORAL at 08:36

## 2021-05-14 RX ADMIN — CARVEDILOL 12.5 MG: 12.5 TABLET, FILM COATED ORAL at 20:51

## 2021-05-14 RX ADMIN — Medication 1000 MCG: at 08:36

## 2021-05-14 RX ADMIN — AMLODIPINE BESYLATE 5 MG: 5 TABLET ORAL at 20:50

## 2021-05-14 RX ADMIN — LATANOPROST 1 DROP: 50 SOLUTION/ DROPS OPHTHALMIC at 21:02

## 2021-05-14 RX ADMIN — MULTIPLE VITAMINS W/ MINERALS TAB 1 TABLET: TAB at 08:36

## 2021-05-14 RX ADMIN — FAMOTIDINE 20 MG: 20 TABLET, FILM COATED ORAL at 20:55

## 2021-05-14 RX ADMIN — TIMOLOL MALEATE 1 DROP: 2.5 SOLUTION OPHTHALMIC at 08:36

## 2021-05-14 RX ADMIN — ACETAMINOPHEN 650 MG: 325 TABLET, FILM COATED ORAL at 06:41

## 2021-05-15 LAB
ALBUMIN SERPL-MCNC: 3.4 G/DL (ref 3.5–5.2)
ALBUMIN/GLOB SERPL: 1.4 G/DL
ALP SERPL-CCNC: 84 U/L (ref 39–117)
ALT SERPL W P-5'-P-CCNC: 8 U/L (ref 1–33)
ANION GAP SERPL CALCULATED.3IONS-SCNC: 5.9 MMOL/L (ref 5–15)
AST SERPL-CCNC: 18 U/L (ref 1–32)
BASOPHILS # BLD AUTO: 0.01 10*3/MM3 (ref 0–0.2)
BASOPHILS NFR BLD AUTO: 0.2 % (ref 0–1.5)
BILIRUB SERPL-MCNC: 0.3 MG/DL (ref 0–1.2)
BUN SERPL-MCNC: 5 MG/DL (ref 8–23)
BUN/CREAT SERPL: 12.2 (ref 7–25)
CALCIUM SPEC-SCNC: 8.8 MG/DL (ref 8.2–9.6)
CHLORIDE SERPL-SCNC: 100 MMOL/L (ref 98–107)
CO2 SERPL-SCNC: 29.1 MMOL/L (ref 22–29)
CREAT SERPL-MCNC: 0.41 MG/DL (ref 0.57–1)
DEPRECATED RDW RBC AUTO: 54.8 FL (ref 37–54)
EOSINOPHIL # BLD AUTO: 0.06 10*3/MM3 (ref 0–0.4)
EOSINOPHIL NFR BLD AUTO: 1.2 % (ref 0.3–6.2)
ERYTHROCYTE [DISTWIDTH] IN BLOOD BY AUTOMATED COUNT: 15.2 % (ref 12.3–15.4)
GFR SERPL CREATININE-BSD FRML MDRD: 146 ML/MIN/1.73
GLOBULIN UR ELPH-MCNC: 2.5 GM/DL
GLUCOSE SERPL-MCNC: 89 MG/DL (ref 65–99)
HCT VFR BLD AUTO: 37.7 % (ref 34–46.6)
HGB BLD-MCNC: 12.4 G/DL (ref 12–15.9)
IMM GRANULOCYTES # BLD AUTO: 0.05 10*3/MM3 (ref 0–0.05)
IMM GRANULOCYTES NFR BLD AUTO: 1 % (ref 0–0.5)
LIPASE SERPL-CCNC: 53 U/L (ref 13–60)
LYMPHOCYTES # BLD AUTO: 0.46 10*3/MM3 (ref 0.7–3.1)
LYMPHOCYTES NFR BLD AUTO: 9.2 % (ref 19.6–45.3)
MCH RBC QN AUTO: 32.8 PG (ref 26.6–33)
MCHC RBC AUTO-ENTMCNC: 32.9 G/DL (ref 31.5–35.7)
MCV RBC AUTO: 99.7 FL (ref 79–97)
MONOCYTES # BLD AUTO: 0.47 10*3/MM3 (ref 0.1–0.9)
MONOCYTES NFR BLD AUTO: 9.4 % (ref 5–12)
NEUTROPHILS NFR BLD AUTO: 3.95 10*3/MM3 (ref 1.7–7)
NEUTROPHILS NFR BLD AUTO: 79 % (ref 42.7–76)
NRBC BLD AUTO-RTO: 0 /100 WBC (ref 0–0.2)
PLATELET # BLD AUTO: 276 10*3/MM3 (ref 140–450)
PMV BLD AUTO: 9.3 FL (ref 6–12)
POTASSIUM SERPL-SCNC: 4.1 MMOL/L (ref 3.5–5.2)
PROT SERPL-MCNC: 5.9 G/DL (ref 6–8.5)
RBC # BLD AUTO: 3.78 10*6/MM3 (ref 3.77–5.28)
SODIUM SERPL-SCNC: 135 MMOL/L (ref 136–145)
WBC # BLD AUTO: 5 10*3/MM3 (ref 3.4–10.8)

## 2021-05-15 PROCEDURE — 99214 OFFICE O/P EST MOD 30 MIN: CPT | Performed by: INTERNAL MEDICINE

## 2021-05-15 PROCEDURE — 83690 ASSAY OF LIPASE: CPT | Performed by: HOSPITALIST

## 2021-05-15 PROCEDURE — 80053 COMPREHEN METABOLIC PANEL: CPT | Performed by: HOSPITALIST

## 2021-05-15 PROCEDURE — G0378 HOSPITAL OBSERVATION PER HR: HCPCS

## 2021-05-15 PROCEDURE — 85025 COMPLETE CBC W/AUTO DIFF WBC: CPT | Performed by: HOSPITALIST

## 2021-05-15 RX ORDER — FAMOTIDINE 20 MG/1
20 TABLET, FILM COATED ORAL DAILY
Status: DISCONTINUED | OUTPATIENT
Start: 2021-05-16 | End: 2021-05-18 | Stop reason: HOSPADM

## 2021-05-15 RX ADMIN — LATANOPROST 1 DROP: 50 SOLUTION/ DROPS OPHTHALMIC at 21:08

## 2021-05-15 RX ADMIN — CARVEDILOL 12.5 MG: 12.5 TABLET, FILM COATED ORAL at 08:23

## 2021-05-15 RX ADMIN — METRONIDAZOLE 500 MG: 500 TABLET, FILM COATED ORAL at 14:24

## 2021-05-15 RX ADMIN — Medication 1000 MCG: at 08:23

## 2021-05-15 RX ADMIN — FAMOTIDINE 20 MG: 20 TABLET, FILM COATED ORAL at 07:21

## 2021-05-15 RX ADMIN — Medication 3 MG: at 23:44

## 2021-05-15 RX ADMIN — CARVEDILOL 12.5 MG: 12.5 TABLET, FILM COATED ORAL at 17:04

## 2021-05-15 RX ADMIN — METRONIDAZOLE 500 MG: 500 TABLET, FILM COATED ORAL at 07:21

## 2021-05-15 RX ADMIN — TRAMADOL HYDROCHLORIDE 50 MG: 50 TABLET ORAL at 21:52

## 2021-05-15 RX ADMIN — AMLODIPINE BESYLATE 5 MG: 5 TABLET ORAL at 08:23

## 2021-05-15 RX ADMIN — ASPIRIN 81 MG: 81 TABLET, COATED ORAL at 08:23

## 2021-05-15 RX ADMIN — METRONIDAZOLE 500 MG: 500 TABLET, FILM COATED ORAL at 21:08

## 2021-05-15 RX ADMIN — ACETAMINOPHEN 1000 MG: 500 TABLET, FILM COATED ORAL at 09:22

## 2021-05-15 RX ADMIN — ACETAMINOPHEN 650 MG: 325 TABLET, FILM COATED ORAL at 21:52

## 2021-05-15 RX ADMIN — Medication 3 MG: at 00:22

## 2021-05-15 RX ADMIN — TIMOLOL MALEATE 1 DROP: 2.5 SOLUTION OPHTHALMIC at 08:23

## 2021-05-15 RX ADMIN — ATORVASTATIN CALCIUM 40 MG: 20 TABLET, FILM COATED ORAL at 08:23

## 2021-05-15 RX ADMIN — FOLIC ACID 1 MG: 1 TABLET ORAL at 08:23

## 2021-05-16 LAB
FOLATE BLD-MCNC: >620 NG/ML
FOLATE RBC-MCNC: >1994 NG/ML
HCT VFR BLD AUTO: 31.1 % (ref 34–46.6)

## 2021-05-16 PROCEDURE — G0378 HOSPITAL OBSERVATION PER HR: HCPCS

## 2021-05-16 PROCEDURE — 96361 HYDRATE IV INFUSION ADD-ON: CPT

## 2021-05-16 PROCEDURE — 99214 OFFICE O/P EST MOD 30 MIN: CPT | Performed by: INTERNAL MEDICINE

## 2021-05-16 RX ORDER — LOPERAMIDE HCL 1 MG/7.5ML
1 SOLUTION ORAL DAILY
Status: DISCONTINUED | OUTPATIENT
Start: 2021-05-16 | End: 2021-05-18 | Stop reason: HOSPADM

## 2021-05-16 RX ADMIN — LATANOPROST 1 DROP: 50 SOLUTION/ DROPS OPHTHALMIC at 21:21

## 2021-05-16 RX ADMIN — FOLIC ACID 1 MG: 1 TABLET ORAL at 08:30

## 2021-05-16 RX ADMIN — FAMOTIDINE 20 MG: 20 TABLET, FILM COATED ORAL at 08:30

## 2021-05-16 RX ADMIN — ACETAMINOPHEN 1000 MG: 500 TABLET, FILM COATED ORAL at 21:48

## 2021-05-16 RX ADMIN — ATORVASTATIN CALCIUM 40 MG: 20 TABLET, FILM COATED ORAL at 08:30

## 2021-05-16 RX ADMIN — ASPIRIN 81 MG: 81 TABLET, COATED ORAL at 08:30

## 2021-05-16 RX ADMIN — CARVEDILOL 12.5 MG: 12.5 TABLET, FILM COATED ORAL at 17:24

## 2021-05-16 RX ADMIN — SODIUM CHLORIDE 100 ML/HR: 9 INJECTION, SOLUTION INTRAVENOUS at 03:00

## 2021-05-16 RX ADMIN — TRAMADOL HYDROCHLORIDE 50 MG: 50 TABLET ORAL at 23:16

## 2021-05-16 RX ADMIN — TIMOLOL MALEATE 1 DROP: 2.5 SOLUTION OPHTHALMIC at 08:30

## 2021-05-16 RX ADMIN — METRONIDAZOLE 500 MG: 500 TABLET, FILM COATED ORAL at 06:39

## 2021-05-16 RX ADMIN — ACETAMINOPHEN 650 MG: 325 TABLET, FILM COATED ORAL at 05:32

## 2021-05-16 RX ADMIN — ACETAMINOPHEN 1000 MG: 500 TABLET, FILM COATED ORAL at 10:03

## 2021-05-16 RX ADMIN — Medication 3 MG: at 21:49

## 2021-05-16 RX ADMIN — TRAMADOL HYDROCHLORIDE 50 MG: 50 TABLET ORAL at 15:40

## 2021-05-16 RX ADMIN — Medication 1000 MCG: at 08:30

## 2021-05-16 RX ADMIN — AMLODIPINE BESYLATE 5 MG: 5 TABLET ORAL at 08:30

## 2021-05-16 RX ADMIN — CARVEDILOL 12.5 MG: 12.5 TABLET, FILM COATED ORAL at 08:30

## 2021-05-17 LAB
ALBUMIN SERPL-MCNC: 3.8 G/DL (ref 3.5–5.2)
ALBUMIN/GLOB SERPL: 1.5 G/DL
ALP SERPL-CCNC: 85 U/L (ref 39–117)
ALT SERPL W P-5'-P-CCNC: 12 U/L (ref 1–33)
ANION GAP SERPL CALCULATED.3IONS-SCNC: 10.1 MMOL/L (ref 5–15)
AST SERPL-CCNC: 19 U/L (ref 1–32)
BASOPHILS # BLD AUTO: 0.01 10*3/MM3 (ref 0–0.2)
BASOPHILS NFR BLD AUTO: 0.2 % (ref 0–1.5)
BILIRUB SERPL-MCNC: 0.3 MG/DL (ref 0–1.2)
BUN SERPL-MCNC: 4 MG/DL (ref 8–23)
BUN/CREAT SERPL: 12.1 (ref 7–25)
CALCIUM SPEC-SCNC: 8.7 MG/DL (ref 8.2–9.6)
CHLORIDE SERPL-SCNC: 97 MMOL/L (ref 98–107)
CO2 SERPL-SCNC: 27.9 MMOL/L (ref 22–29)
CREAT SERPL-MCNC: 0.33 MG/DL (ref 0.57–1)
DEPRECATED RDW RBC AUTO: 54.5 FL (ref 37–54)
EOSINOPHIL # BLD AUTO: 0.04 10*3/MM3 (ref 0–0.4)
EOSINOPHIL NFR BLD AUTO: 1 % (ref 0.3–6.2)
ERYTHROCYTE [DISTWIDTH] IN BLOOD BY AUTOMATED COUNT: 14.9 % (ref 12.3–15.4)
GFR SERPL CREATININE-BSD FRML MDRD: >150 ML/MIN/1.73
GLOBULIN UR ELPH-MCNC: 2.5 GM/DL
GLUCOSE SERPL-MCNC: 113 MG/DL (ref 65–99)
HCT VFR BLD AUTO: 31.8 % (ref 34–46.6)
HGB BLD-MCNC: 10.4 G/DL (ref 12–15.9)
IMM GRANULOCYTES # BLD AUTO: 0.05 10*3/MM3 (ref 0–0.05)
IMM GRANULOCYTES NFR BLD AUTO: 1.2 % (ref 0–0.5)
LYMPHOCYTES # BLD AUTO: 0.59 10*3/MM3 (ref 0.7–3.1)
LYMPHOCYTES NFR BLD AUTO: 14.2 % (ref 19.6–45.3)
MCH RBC QN AUTO: 32.8 PG (ref 26.6–33)
MCHC RBC AUTO-ENTMCNC: 32.7 G/DL (ref 31.5–35.7)
MCV RBC AUTO: 100.3 FL (ref 79–97)
MONOCYTES # BLD AUTO: 0.49 10*3/MM3 (ref 0.1–0.9)
MONOCYTES NFR BLD AUTO: 11.8 % (ref 5–12)
NEUTROPHILS NFR BLD AUTO: 2.97 10*3/MM3 (ref 1.7–7)
NEUTROPHILS NFR BLD AUTO: 71.6 % (ref 42.7–76)
NRBC BLD AUTO-RTO: 0 /100 WBC (ref 0–0.2)
PLATELET # BLD AUTO: 193 10*3/MM3 (ref 140–450)
PMV BLD AUTO: 9.2 FL (ref 6–12)
POTASSIUM SERPL-SCNC: 3.2 MMOL/L (ref 3.5–5.2)
PROT SERPL-MCNC: 6.3 G/DL (ref 6–8.5)
RBC # BLD AUTO: 3.17 10*6/MM3 (ref 3.77–5.28)
SODIUM SERPL-SCNC: 135 MMOL/L (ref 136–145)
WBC # BLD AUTO: 4.15 10*3/MM3 (ref 3.4–10.8)

## 2021-05-17 PROCEDURE — 96361 HYDRATE IV INFUSION ADD-ON: CPT

## 2021-05-17 PROCEDURE — 85025 COMPLETE CBC W/AUTO DIFF WBC: CPT | Performed by: HOSPITALIST

## 2021-05-17 PROCEDURE — G0378 HOSPITAL OBSERVATION PER HR: HCPCS

## 2021-05-17 PROCEDURE — 97110 THERAPEUTIC EXERCISES: CPT

## 2021-05-17 PROCEDURE — 96375 TX/PRO/DX INJ NEW DRUG ADDON: CPT

## 2021-05-17 PROCEDURE — 99214 OFFICE O/P EST MOD 30 MIN: CPT | Performed by: NURSE PRACTITIONER

## 2021-05-17 PROCEDURE — 25010000002 ONDANSETRON PER 1 MG: Performed by: NURSE PRACTITIONER

## 2021-05-17 PROCEDURE — 80053 COMPREHEN METABOLIC PANEL: CPT | Performed by: HOSPITALIST

## 2021-05-17 RX ORDER — CARVEDILOL 25 MG/1
25 TABLET ORAL 2 TIMES DAILY WITH MEALS
Status: DISCONTINUED | OUTPATIENT
Start: 2021-05-17 | End: 2021-05-18 | Stop reason: HOSPADM

## 2021-05-17 RX ORDER — POTASSIUM CHLORIDE 750 MG/1
40 TABLET, FILM COATED, EXTENDED RELEASE ORAL ONCE
Status: COMPLETED | OUTPATIENT
Start: 2021-05-17 | End: 2021-05-17

## 2021-05-17 RX ADMIN — TIMOLOL MALEATE 1 DROP: 2.5 SOLUTION OPHTHALMIC at 08:49

## 2021-05-17 RX ADMIN — AMLODIPINE BESYLATE 5 MG: 5 TABLET ORAL at 08:40

## 2021-05-17 RX ADMIN — LATANOPROST 1 DROP: 50 SOLUTION/ DROPS OPHTHALMIC at 20:43

## 2021-05-17 RX ADMIN — MULTIPLE VITAMINS W/ MINERALS TAB 1 TABLET: TAB at 08:40

## 2021-05-17 RX ADMIN — SODIUM CHLORIDE 100 ML/HR: 9 INJECTION, SOLUTION INTRAVENOUS at 11:57

## 2021-05-17 RX ADMIN — NITROGLYCERIN 0.4 MG: 0.4 TABLET SUBLINGUAL at 03:56

## 2021-05-17 RX ADMIN — ASPIRIN 81 MG: 81 TABLET, COATED ORAL at 08:40

## 2021-05-17 RX ADMIN — CARVEDILOL 12.5 MG: 12.5 TABLET, FILM COATED ORAL at 08:40

## 2021-05-17 RX ADMIN — Medication 1000 MCG: at 08:40

## 2021-05-17 RX ADMIN — FOLIC ACID 1 MG: 1 TABLET ORAL at 08:40

## 2021-05-17 RX ADMIN — ACETAMINOPHEN 1000 MG: 500 TABLET, FILM COATED ORAL at 20:44

## 2021-05-17 RX ADMIN — ATORVASTATIN CALCIUM 40 MG: 20 TABLET, FILM COATED ORAL at 08:40

## 2021-05-17 RX ADMIN — Medication 3 MG: at 20:44

## 2021-05-17 RX ADMIN — CARVEDILOL 25 MG: 25 TABLET, FILM COATED ORAL at 18:13

## 2021-05-17 RX ADMIN — FAMOTIDINE 20 MG: 20 TABLET, FILM COATED ORAL at 08:40

## 2021-05-17 RX ADMIN — ONDANSETRON 4 MG: 2 INJECTION INTRAMUSCULAR; INTRAVENOUS at 23:36

## 2021-05-17 RX ADMIN — POTASSIUM CHLORIDE 40 MEQ: 750 TABLET, EXTENDED RELEASE ORAL at 11:57

## 2021-05-17 RX ADMIN — TRAMADOL HYDROCHLORIDE 50 MG: 50 TABLET ORAL at 23:42

## 2021-05-17 RX ADMIN — SODIUM CHLORIDE 100 ML/HR: 9 INJECTION, SOLUTION INTRAVENOUS at 01:52

## 2021-05-17 RX ADMIN — LOPERAMIDE HCL 1 MG: 1 SUSPENSION ORAL at 13:49

## 2021-05-18 VITALS
TEMPERATURE: 98 F | HEART RATE: 100 BPM | HEIGHT: 60 IN | WEIGHT: 86.42 LBS | BODY MASS INDEX: 16.97 KG/M2 | OXYGEN SATURATION: 97 % | RESPIRATION RATE: 18 BRPM | SYSTOLIC BLOOD PRESSURE: 142 MMHG | DIASTOLIC BLOOD PRESSURE: 98 MMHG

## 2021-05-18 LAB
ANION GAP SERPL CALCULATED.3IONS-SCNC: 7.9 MMOL/L (ref 5–15)
BASOPHILS # BLD AUTO: 0.02 10*3/MM3 (ref 0–0.2)
BASOPHILS NFR BLD AUTO: 0.4 % (ref 0–1.5)
BUN SERPL-MCNC: 8 MG/DL (ref 8–23)
BUN/CREAT SERPL: 20 (ref 7–25)
CALCIUM SPEC-SCNC: 8.5 MG/DL (ref 8.2–9.6)
CHLORIDE SERPL-SCNC: 103 MMOL/L (ref 98–107)
CO2 SERPL-SCNC: 28.1 MMOL/L (ref 22–29)
CREAT SERPL-MCNC: 0.4 MG/DL (ref 0.57–1)
DEPRECATED RDW RBC AUTO: 52.6 FL (ref 37–54)
EOSINOPHIL # BLD AUTO: 0.04 10*3/MM3 (ref 0–0.4)
EOSINOPHIL NFR BLD AUTO: 0.9 % (ref 0.3–6.2)
ERYTHROCYTE [DISTWIDTH] IN BLOOD BY AUTOMATED COUNT: 15 % (ref 12.3–15.4)
GFR SERPL CREATININE-BSD FRML MDRD: 150 ML/MIN/1.73
GLUCOSE SERPL-MCNC: 84 MG/DL (ref 65–99)
HCT VFR BLD AUTO: 33.7 % (ref 34–46.6)
HGB BLD-MCNC: 11.4 G/DL (ref 12–15.9)
IMM GRANULOCYTES # BLD AUTO: 0.03 10*3/MM3 (ref 0–0.05)
IMM GRANULOCYTES NFR BLD AUTO: 0.6 % (ref 0–0.5)
LYMPHOCYTES # BLD AUTO: 0.7 10*3/MM3 (ref 0.7–3.1)
LYMPHOCYTES NFR BLD AUTO: 15 % (ref 19.6–45.3)
MCH RBC QN AUTO: 32.8 PG (ref 26.6–33)
MCHC RBC AUTO-ENTMCNC: 33.8 G/DL (ref 31.5–35.7)
MCV RBC AUTO: 96.8 FL (ref 79–97)
MONOCYTES # BLD AUTO: 0.54 10*3/MM3 (ref 0.1–0.9)
MONOCYTES NFR BLD AUTO: 11.6 % (ref 5–12)
NEUTROPHILS NFR BLD AUTO: 3.33 10*3/MM3 (ref 1.7–7)
NEUTROPHILS NFR BLD AUTO: 71.5 % (ref 42.7–76)
NRBC BLD AUTO-RTO: 0 /100 WBC (ref 0–0.2)
PLATELET # BLD AUTO: 229 10*3/MM3 (ref 140–450)
PMV BLD AUTO: 9.1 FL (ref 6–12)
POTASSIUM SERPL-SCNC: 3.5 MMOL/L (ref 3.5–5.2)
RBC # BLD AUTO: 3.48 10*6/MM3 (ref 3.77–5.28)
SODIUM SERPL-SCNC: 139 MMOL/L (ref 136–145)
WBC # BLD AUTO: 4.66 10*3/MM3 (ref 3.4–10.8)

## 2021-05-18 PROCEDURE — G0378 HOSPITAL OBSERVATION PER HR: HCPCS

## 2021-05-18 PROCEDURE — 80048 BASIC METABOLIC PNL TOTAL CA: CPT | Performed by: HOSPITALIST

## 2021-05-18 PROCEDURE — 85025 COMPLETE CBC W/AUTO DIFF WBC: CPT | Performed by: HOSPITALIST

## 2021-05-18 RX ORDER — LOPERAMIDE HCL 1 MG/7.5ML
1 SOLUTION ORAL DAILY
Start: 2021-05-19 | End: 2021-07-07

## 2021-05-18 RX ORDER — CARVEDILOL 25 MG/1
25 TABLET ORAL 2 TIMES DAILY WITH MEALS
Qty: 60 TABLET | Refills: 0 | Status: SHIPPED | OUTPATIENT
Start: 2021-05-18 | End: 2022-01-08

## 2021-05-18 RX ORDER — AMLODIPINE BESYLATE 5 MG/1
5 TABLET ORAL
Qty: 30 TABLET | Refills: 0 | Status: SHIPPED | OUTPATIENT
Start: 2021-05-19 | End: 2021-06-18

## 2021-05-18 RX ADMIN — FOLIC ACID 1 MG: 1 TABLET ORAL at 08:10

## 2021-05-18 RX ADMIN — CARVEDILOL 25 MG: 25 TABLET, FILM COATED ORAL at 08:10

## 2021-05-18 RX ADMIN — TIMOLOL MALEATE 1 DROP: 2.5 SOLUTION OPHTHALMIC at 08:12

## 2021-05-18 RX ADMIN — Medication 1000 MCG: at 08:10

## 2021-05-18 RX ADMIN — FAMOTIDINE 20 MG: 20 TABLET, FILM COATED ORAL at 08:10

## 2021-05-18 RX ADMIN — ATORVASTATIN CALCIUM 40 MG: 20 TABLET, FILM COATED ORAL at 08:10

## 2021-05-18 RX ADMIN — LOPERAMIDE HCL 1 MG: 1 SUSPENSION ORAL at 10:26

## 2021-05-18 RX ADMIN — AMLODIPINE BESYLATE 5 MG: 5 TABLET ORAL at 08:10

## 2021-05-18 RX ADMIN — ASPIRIN 81 MG: 81 TABLET, COATED ORAL at 08:10

## 2021-05-18 RX ADMIN — MULTIPLE VITAMINS W/ MINERALS TAB 1 TABLET: TAB at 08:10

## 2021-07-07 ENCOUNTER — OFFICE VISIT (OUTPATIENT)
Dept: GASTROENTEROLOGY | Facility: CLINIC | Age: 86
End: 2021-07-07

## 2021-07-07 VITALS
SYSTOLIC BLOOD PRESSURE: 140 MMHG | DIASTOLIC BLOOD PRESSURE: 76 MMHG | BODY MASS INDEX: 17.59 KG/M2 | WEIGHT: 89.6 LBS | HEIGHT: 60 IN

## 2021-07-07 DIAGNOSIS — R19.7 DIARRHEA OF PRESUMED INFECTIOUS ORIGIN: Primary | ICD-10-CM

## 2021-07-07 DIAGNOSIS — K52.9 ENTERITIS: ICD-10-CM

## 2021-07-07 DIAGNOSIS — K21.9 GERD WITHOUT ESOPHAGITIS: ICD-10-CM

## 2021-07-07 PROCEDURE — 99213 OFFICE O/P EST LOW 20 MIN: CPT | Performed by: INTERNAL MEDICINE

## 2021-07-07 RX ORDER — AMLODIPINE BESYLATE 5 MG/1
5 TABLET ORAL DAILY
COMMUNITY
Start: 2021-03-24 | End: 2021-07-22

## 2021-07-07 RX ORDER — CARVEDILOL 25 MG/1
25 TABLET ORAL
Status: ON HOLD | COMMUNITY
Start: 2021-03-25 | End: 2022-01-08

## 2021-07-07 RX ORDER — MELATONIN 3 MG
5 LOZENGE ORAL
COMMUNITY
Start: 2021-04-25

## 2021-07-07 NOTE — PROGRESS NOTES
Chief Complaint   Patient presents with   • Hospital follow up-enteritis       History of Present Illness:   89 y.o. female with a h/o HTN, cervical cancer (s/p XRT in 2019), h/o pancreatitis, femur fx, UTI, GERD who was admitted 5/12/2021 with watery diarrhea.  I had seen her in consultation.  My assessment and recommendations were as follows:  Assessment:   1. 89 y.o. female with a h/o HTN, cervical cancer (s/p XRT in 2019), h/o pancreatitis, femur fx, UTI, GERD who was admitted 5/12/2021 with watery diarrhea for the last 6 days.  CT of the abdomen shows moderate enteritis and proctocolitis involving a long segment of duodenum as well as multiple segments of large bowel and rectum.  2.  The patient does have macrocytic anemia.     Plan:   1.  I await the results of stool studies.  2.  I would check serial H&H's.  3.  I will check anemia labs.     She was admitted to [facility] from 5/12/2021 through 5/18/2021.  The patient seemed to respond to Flagyl, her stool studies were unrevealing, and she was discharged home.  She wanted to avoid having EGD and colonoscopy if possible.       No diarrhea now. Some substernal discomfort that is worse after eating. It is worse after lying down. She doesn't know what makes this better. No SOA. NO nausea or vomiting. NO fevers, chills. No consitpation. No rectal bleeding or melena. She has gained a few pounds. She is on one baby aspirin/day but no other NSAIDs. Nonsmoker. No ETOH. She last had an EGD and colonoscopy about 10 yrs ago.     Past Medical History:   Diagnosis Date   • Arthritis    • Cancer (CMS/HCC)     cervical CA 2018 finished radiation   • Elevated cholesterol    • Enteritis    • GERD (gastroesophageal reflux disease)    • Hypertension        Past Surgical History:   Procedure Laterality Date   • COLONOSCOPY     • ENDOSCOPY     • FRACTURE SURGERY      feb 23 2021         Current Outpatient Medications:   •  acetaminophen (TYLENOL) 500 MG tablet, Take  1,000 mg by mouth Every 6 (Six) Hours As Needed for Mild Pain ., Disp: , Rfl:   •  amLODIPine (NORVASC) 5 MG tablet, Take 5 mg by mouth Daily., Disp: , Rfl:   •  aspirin 81 MG EC tablet, Take 81 mg by mouth Daily., Disp: , Rfl:   •  atorvastatin (LIPITOR) 40 MG tablet, Take 40 mg by mouth Daily., Disp: , Rfl:   •  carvedilol (COREG) 25 MG tablet, Take 25 mg by mouth., Disp: , Rfl:   •  estradiol (VAGIFEM) 10 MCG tablet vaginal tablet, Insert 1 tablet into the vagina 2 (Two) Times a Week., Disp: , Rfl:   •  folic acid (FOLVITE) 1 MG tablet, Take 1 mg by mouth Daily., Disp: , Rfl:   •  latanoprost (XALATAN) 0.005 % ophthalmic solution, 1 drop Every Night., Disp: , Rfl:   •  Melatonin 1 MG/4ML liquid, 5 mg., Disp: , Rfl:   •  multivitamin with minerals (MULTIVITAMIN ADULT PO), Take 1 tablet by mouth Daily., Disp: , Rfl:   •  pantoprazole (PROTONIX) 40 MG EC tablet, Take 40 mg by mouth Daily., Disp: , Rfl:   •  timolol (TIMOPTIC) 0.25 % ophthalmic solution, 1 drop 2 (Two) Times a Day., Disp: , Rfl:   •  traMADol (ULTRAM) 50 MG tablet, Take 50 mg by mouth Every 8 (Eight) Hours As Needed for Moderate Pain ., Disp: , Rfl:   •  vitamin B-12 (CYANOCOBALAMIN) 1000 MCG tablet, Take 1,000 mcg by mouth Daily., Disp: , Rfl:   •  carvedilol (COREG) 25 MG tablet, Take 1 tablet by mouth 2 (Two) Times a Day With Meals for 30 days., Disp: 60 tablet, Rfl: 0    Allergies   Allergen Reactions   • Bactrim [Sulfamethoxazole-Trimethoprim] GI Intolerance   • Doxycycline GI Intolerance   • Fosamax [Alendronate] GI Intolerance   • Lactose Intolerance (Gi) GI Intolerance   • Macrodantin [Nitrofurantoin] GI Intolerance   • Naproxen GI Intolerance   • Zestril [Lisinopril] GI Intolerance       History reviewed. No pertinent family history.    Social History     Socioeconomic History   • Marital status:      Spouse name: Not on file   • Number of children: Not on file   • Years of education: Not on file   • Highest education level: Not on  file   Tobacco Use   • Smoking status: Never Smoker   • Smokeless tobacco: Never Used   Substance and Sexual Activity   • Alcohol use: Not Currently   • Drug use: Not Currently   • Sexual activity: Defer       Review of Systems   Gastrointestinal: Positive for abdominal pain.     Pertinent positives and negatives documented in the HPI and all other systems reviewed and were found to be negative.  Vitals:    07/07/21 1416   BP: 140/76       Physical Exam  Vitals reviewed.   Constitutional:       General: She is not in acute distress.     Appearance: Normal appearance. She is well-developed. She is not diaphoretic.      Comments: Thin.    HENT:      Head: Normocephalic and atraumatic. Hair is normal.      Right Ear: Hearing, tympanic membrane, ear canal and external ear normal. No decreased hearing noted. No drainage.      Left Ear: Hearing, tympanic membrane, ear canal and external ear normal. No decreased hearing noted.      Nose: Nose normal. No nasal deformity.      Mouth/Throat:      Mouth: Mucous membranes are moist.   Eyes:      General: Lids are normal.         Right eye: No discharge.         Left eye: No discharge.      Extraocular Movements: Extraocular movements intact.      Conjunctiva/sclera: Conjunctivae normal.      Pupils: Pupils are equal, round, and reactive to light.   Neck:      Thyroid: No thyromegaly.      Vascular: No JVD.      Trachea: No tracheal deviation.   Cardiovascular:      Rate and Rhythm: Normal rate and regular rhythm.      Pulses: Normal pulses.      Heart sounds: Normal heart sounds. No murmur heard.   No friction rub. No gallop.    Pulmonary:      Effort: Pulmonary effort is normal. No respiratory distress.      Breath sounds: Normal breath sounds. No wheezing or rales.   Chest:      Chest wall: No tenderness.   Abdominal:      General: Bowel sounds are normal. There is no distension.      Palpations: Abdomen is soft. There is no mass.      Tenderness: There is no abdominal  tenderness. There is no guarding or rebound.      Hernia: No hernia is present.   Genitourinary:     Rectum: Normal. Guaiac result negative.   Musculoskeletal:         General: No tenderness or deformity. Normal range of motion.      Cervical back: Normal range of motion and neck supple.   Lymphadenopathy:      Cervical: No cervical adenopathy.   Skin:     General: Skin is warm and dry.      Findings: No erythema or rash.   Neurological:      Mental Status: She is alert and oriented to person, place, and time.      Cranial Nerves: No cranial nerve deficit.      Motor: No abnormal muscle tone.      Coordination: Coordination normal.      Deep Tendon Reflexes: Reflexes are normal and symmetric. Reflexes normal.   Psychiatric:         Mood and Affect: Mood normal.         Behavior: Behavior normal.         Thought Content: Thought content normal.         Judgment: Judgment normal.         Diagnoses and all orders for this visit:    1. Diarrhea of presumed infectious origin (Primary)    2. GERD without esophagitis    3. Enteritis      Assessment:  1. H/o enteritis and colitis with diarrhea that has resolved.   2. Epigastric pain    Recommendations:  1. She doesn't want to have an EGD and colonoscopy.  2. She would prefer not to have a repeat CT abd/pelvis. She is better and is 89 yo.   3. F/u prn  4. Take a supplement daily to increase calories.     Return if symptoms worsen or fail to improve.    Feliberto Fernández MD  7/7/2021

## 2021-08-06 ENCOUNTER — TELEPHONE (OUTPATIENT)
Dept: GASTROENTEROLOGY | Facility: CLINIC | Age: 86
End: 2021-08-06

## 2021-08-06 NOTE — TELEPHONE ENCOUNTER
Call to daughter, Mary (POA).  States fluctuating between diarrhea and constipation.  Heartburn has returned to the point that wakens her.   States pt called her today and said willing to proceed with anything necessary testing.     Advise per o/v of 7/7 that Dr Fernández  had suggested CT abd.  States did have CT and MRI abd in May (see imaging tab).  States pt willing to have EGD and understood that maybe would be able to just do flex sig rather than entire c/s.      Update/request to Dr Fernández

## 2021-08-06 NOTE — TELEPHONE ENCOUNTER
----- Message from Larry Reis sent at 8/6/2021 12:17 PM EDT -----  Regarding: Possible testing to be performed  Contact: [phone]  Pt's daughter Mary Grider calling regarding possible other testing being performed as pt still in a lot of pain in stomach and chest.  No verbal auth on file.  Thank You.

## 2021-08-11 ENCOUNTER — TELEPHONE (OUTPATIENT)
Dept: GASTROENTEROLOGY | Facility: CLINIC | Age: 86
End: 2021-08-11

## 2021-08-11 DIAGNOSIS — K21.9 GERD WITHOUT ESOPHAGITIS: Primary | ICD-10-CM

## 2021-08-11 DIAGNOSIS — R63.4 WEIGHT LOSS: ICD-10-CM

## 2021-08-11 DIAGNOSIS — R19.7 DIARRHEA, UNSPECIFIED TYPE: ICD-10-CM

## 2021-08-11 DIAGNOSIS — K59.00 CONSTIPATION, UNSPECIFIED CONSTIPATION TYPE: ICD-10-CM

## 2021-08-11 DIAGNOSIS — R10.84 GENERALIZED ABDOMINAL PAIN: ICD-10-CM

## 2021-08-11 NOTE — TELEPHONE ENCOUNTER
I called her daughter (Mary Grider - [phone]) who says the patient has abdominal pain and has acid reflux. The patient is now saying that maybe she should have some of the tests that I wanted to do. She has diarrhea and constiaption off and on, controlled with imodium. She has lost lots of weight: 35 pounds x 18 mos. She has had cervical cancer and had radiation therapy 3 yrs ago. She uses a walker. Some nausea and headaches. No fevers, chills. No rectal bleeding or melena.     Assessment:  1. Recurrent abdominal pain of unknown cause?  2.     Plan - have her come by the office for:  - Labs: CBC, CMP, amylase, lipase, UA  - Please have her f/u with Ms. Roman (or me) about one week after the above labs are done.   -If the above labs do not show a cause for her abdominal pain then may be we should get another CT of the abdomen and pelvis?  The daughter is thinking that a repeat CAT scan is not going to show us much since a CAT scan of the abdomen and pelvis was done in 5 of 2021.  lamar

## 2021-08-11 NOTE — TELEPHONE ENCOUNTER
----- Message from Larry Alberto Rep sent at 8/11/2021  1:36 PM EDT -----  Regarding: schedule  Contact: [phone]  Pt's daughter called her name is  Mary Grider wanting to either speak to Dr Fernández or have someone call her back about her mother Bo Adan and maybe getting her an appointment but would like to speak to Doctor 1st if possible

## 2021-08-12 NOTE — TELEPHONE ENCOUNTER
Called pt's daughter and advised of Dr Fernández's note.  Daughter verb understanding and states that they have thought about it and would like to go ahead and get ct scan done at [facility].  Also would like to get labs done at local lab [facility].      Lab orders placed.     Advised will send message for approval to order ct scan and when we call back we can make f/u appt.  Pt's daughter verb understanding.

## 2021-08-13 ENCOUNTER — TELEPHONE (OUTPATIENT)
Dept: GASTROENTEROLOGY | Facility: CLINIC | Age: 86
End: 2021-08-13

## 2021-08-13 LAB
ALBUMIN SERPL-MCNC: 4.1 G/DL (ref 3.5–4.6)
ALBUMIN/GLOB SERPL: 1.6 {RATIO} (ref 1.2–2.2)
ALP SERPL-CCNC: 79 IU/L (ref 48–121)
ALT SERPL-CCNC: 8 IU/L (ref 0–32)
AMYLASE SERPL-CCNC: 123 U/L (ref 31–110)
AST SERPL-CCNC: 19 IU/L (ref 0–40)
BASOPHILS # BLD AUTO: 0 X10E3/UL (ref 0–0.2)
BASOPHILS NFR BLD AUTO: 0 %
BILIRUB SERPL-MCNC: 0.2 MG/DL (ref 0–1.2)
BUN SERPL-MCNC: 11 MG/DL (ref 10–36)
BUN/CREAT SERPL: 24 (ref 12–28)
CALCIUM SERPL-MCNC: 9.5 MG/DL (ref 8.7–10.3)
CHLORIDE SERPL-SCNC: 89 MMOL/L (ref 96–106)
CO2 SERPL-SCNC: 29 MMOL/L (ref 20–29)
CREAT SERPL-MCNC: 0.45 MG/DL (ref 0.57–1)
EOSINOPHIL # BLD AUTO: 0 X10E3/UL (ref 0–0.4)
EOSINOPHIL NFR BLD AUTO: 0 %
ERYTHROCYTE [DISTWIDTH] IN BLOOD BY AUTOMATED COUNT: 13.2 % (ref 11.7–15.4)
GLOBULIN SER CALC-MCNC: 2.6 G/DL (ref 1.5–4.5)
GLUCOSE SERPL-MCNC: 92 MG/DL (ref 65–99)
HCT VFR BLD AUTO: 39.5 % (ref 34–46.6)
HGB BLD-MCNC: 12.9 G/DL (ref 11.1–15.9)
IMM GRANULOCYTES # BLD AUTO: 0 X10E3/UL (ref 0–0.1)
IMM GRANULOCYTES NFR BLD AUTO: 1 %
LIPASE SERPL-CCNC: 58 U/L (ref 14–85)
LYMPHOCYTES # BLD AUTO: 0.5 X10E3/UL (ref 0.7–3.1)
LYMPHOCYTES NFR BLD AUTO: 8 %
MCH RBC QN AUTO: 32.9 PG (ref 26.6–33)
MCHC RBC AUTO-ENTMCNC: 32.7 G/DL (ref 31.5–35.7)
MCV RBC AUTO: 101 FL (ref 79–97)
MONOCYTES # BLD AUTO: 0.4 X10E3/UL (ref 0.1–0.9)
MONOCYTES NFR BLD AUTO: 7 %
NEUTROPHILS # BLD AUTO: 5.1 X10E3/UL (ref 1.4–7)
NEUTROPHILS NFR BLD AUTO: 84 %
PLATELET # BLD AUTO: 248 X10E3/UL (ref 150–450)
POTASSIUM SERPL-SCNC: 4.5 MMOL/L (ref 3.5–5.2)
PROT SERPL-MCNC: 6.7 G/DL (ref 6–8.5)
RBC # BLD AUTO: 3.92 X10E6/UL (ref 3.77–5.28)
SODIUM SERPL-SCNC: 135 MMOL/L (ref 134–144)
WBC # BLD AUTO: 6 X10E3/UL (ref 3.4–10.8)

## 2021-08-13 NOTE — TELEPHONE ENCOUNTER
08/13/21  Tell the patient's daughter that Ms. Adan's lab work looks good.  I do not see the results of a urinalysis (which I thought that we ordered), but the other labs look good.  Please fax a copy of this report to her PCP.  Stephen newton

## 2021-08-13 NOTE — TELEPHONE ENCOUNTER
Called pt's daughter and advised of Dr Avila' notes. Verb understanding and is agreeable to have ct scan at [facility]. Advised can call scheduling at [phone] to arrange.     Ct scan abd/pelvis ordered and message sent to Dr Fernández to cosign.

## 2021-08-13 NOTE — TELEPHONE ENCOUNTER
----- Message from Feliberto Fernández MD sent at 8/13/2021 12:23 PM EDT -----  08/13/21  Tell the patient's daughter that Ms. Adan's lab work looks good.  I do not see the results of a urinalysis (which I thought that we ordered), but the other labs look good.  Please fax a copy of this report to her PCP.  Thx. kjh

## 2021-08-13 NOTE — TELEPHONE ENCOUNTER
"Tell the daughter that I would recommend that the CT abd/pelvis be done at [facility] and not at [facility] because [facility] has the \"old\" CT abd/pelvis and an MRI of the abdomen to compare to. This will be helpful for her mom. lamar  "

## 2021-08-20 ENCOUNTER — HOSPITAL ENCOUNTER (OUTPATIENT)
Dept: CT IMAGING | Facility: HOSPITAL | Age: 86
Discharge: HOME | End: 2021-08-20
Admitting: INTERNAL MEDICINE

## 2021-08-20 PROCEDURE — 25010000002 IOPAMIDOL 61 % SOLUTION: Performed by: INTERNAL MEDICINE

## 2021-08-20 PROCEDURE — 0 DIATRIZOATE MEGLUMINE & SODIUM PER 1 ML: Performed by: INTERNAL MEDICINE

## 2021-08-20 PROCEDURE — 74177 CT ABD & PELVIS W/CONTRAST: CPT

## 2021-08-20 PROCEDURE — 82565 ASSAY OF CREATININE: CPT

## 2021-08-20 RX ADMIN — IOPAMIDOL 85 ML: 612 INJECTION, SOLUTION INTRAVENOUS at 11:39

## 2021-08-20 RX ADMIN — DIATRIZOATE MEGLUMINE AND DIATRIZOATE SODIUM 30 ML: 660; 100 LIQUID ORAL; RECTAL at 10:30

## 2021-08-23 LAB — CREAT BLDA-MCNC: 0.5 MG/DL (ref 0.6–1.3)

## 2021-08-23 NOTE — TELEPHONE ENCOUNTER
08/22/21  Please call her daughter (Mary Grider - [phone]) and tell her that her mom's CT abd/pelvis did not show a cause for weight loss, abdominal pain or diarrhea. She has small liver cysts and left kidney cysts. FAX to her PCP.   Stephen newton

## 2021-08-24 ENCOUNTER — TELEPHONE (OUTPATIENT)
Dept: GASTROENTEROLOGY | Facility: CLINIC | Age: 86
End: 2021-08-24

## 2021-08-24 NOTE — TELEPHONE ENCOUNTER
Called pt and spoke with pt's daughter . She is concerned because she could not get her mother in to see Sadie until the end of summer.    Was able to find appt for her mother for 09/03 at 1045a.  Message sent to Arik to bootk pt in the slot.

## 2021-08-27 ENCOUNTER — TELEPHONE (OUTPATIENT)
Dept: GASTROENTEROLOGY | Facility: CLINIC | Age: 86
End: 2021-08-27

## 2021-08-27 NOTE — TELEPHONE ENCOUNTER
----- Message from Feliberto Fernández MD sent at 8/22/2021  9:02 PM EDT -----  08/22/21  Please call her daughter (Mary Grider - [phone]) and tell her that her mom's CT abd/pelvis did not show a cause for weight loss, abdominal pain or diarrhea. She has small liver cysts and left kidney cysts. FAX to her PCP.   x. kj

## 2021-10-08 ENCOUNTER — OFFICE VISIT (OUTPATIENT)
Dept: GASTROENTEROLOGY | Facility: CLINIC | Age: 86
End: 2021-10-08

## 2021-10-08 VITALS — TEMPERATURE: 97.1 F | WEIGHT: 90.8 LBS | BODY MASS INDEX: 17.14 KG/M2 | HEIGHT: 61 IN

## 2021-10-08 DIAGNOSIS — R10.30 LOWER ABDOMINAL PAIN: ICD-10-CM

## 2021-10-08 DIAGNOSIS — R10.13 EPIGASTRIC PAIN: Primary | ICD-10-CM

## 2021-10-08 DIAGNOSIS — K58.2 IRRITABLE BOWEL SYNDROME WITH BOTH CONSTIPATION AND DIARRHEA: ICD-10-CM

## 2021-10-08 DIAGNOSIS — K21.9 GASTROESOPHAGEAL REFLUX DISEASE, UNSPECIFIED WHETHER ESOPHAGITIS PRESENT: ICD-10-CM

## 2021-10-08 PROCEDURE — 99214 OFFICE O/P EST MOD 30 MIN: CPT | Performed by: NURSE PRACTITIONER

## 2021-10-08 RX ORDER — AMLODIPINE BESYLATE 5 MG/1
5 TABLET ORAL DAILY
COMMUNITY

## 2021-10-08 RX ORDER — TRAZODONE HYDROCHLORIDE 50 MG/1
50 TABLET ORAL NIGHTLY
COMMUNITY

## 2021-10-08 NOTE — PROGRESS NOTES
Chief Complaint   Patient presents with   • Follow-up       Bo Adan is a  89 y.o. female here for a follow up visit for abdominal pain.    HPI  -year-old female presents today accompanied by her daughter for follow-up visit for upper and lower abdominal pain.  She is a patient of Dr. Fernández.  She was last seen in the office on 7/7/2021.  She is new to me today.  She had been seen by our service at [facility] on 5/21.  At that time she had a CT scan of the abdomen pelvis that was positive for enteritis and proctocolitis.  Stool studies were negative.  She was treated with Flagyl.  Her daughter tells me that once the patient went home she never finished the Flagyl.  The patient reports her diarrhea and abdominal issues seem to improve for a little while while at home.  She does have a history of GERD and admits she takes Protonix 40 mg usually before she eats dinner sometime between 3 and 5 PM every afternoon.  She tells me she has been having a lot of upper abdominal pain and burning with eating.  Sometimes is off and on throughout the day sometimes it is worse at night.  She will occasionally take a Pepcid over-the-counter as well.  Last EGD and colonoscopy was more than 10 years ago.  She does have a history of cervical cancer in 2018 and underwent radiation treatments.  She has had pancreatitis before.  She had a repeat CT scan of the abdomen and pelvis done at the end of August that was normal.  She is also had some recent labs which were unremarkable.  The patient also complains of problems with constipation and diarrhea.  She tells me she has to strain a lot.  She has rectal spasms and she will have lower abdominal pain and cramping that make her feel like she needs to have a bowel movement.  She tells me her bowel habits really got worse when she started taking tramadol for arthritis pain.  Takes tramadol 3 times a day.  She tried using MiraLAX but admits it made her go too much.   She takes fiber daily and that helps a little bit.  She denies any dysphagia, vomiting, rectal bleeding or melena.  She admits her appetite is good and her weight is stable right now.  She tells me she really does not want to do any scopes unless she absolutely has to.  She denies any significant GI family history at this time.  Past Medical History:   Diagnosis Date   • Arthritis    • Cancer (HCC)     cervical CA 2018 finished radiation   • Elevated cholesterol    • Enteritis    • GERD (gastroesophageal reflux disease)    • Hypertension        Past Surgical History:   Procedure Laterality Date   • COLONOSCOPY     • ENDOSCOPY     • FRACTURE SURGERY      feb 23 2021       Scheduled Meds:    Continuous Infusions:No current facility-administered medications for this visit.      PRN Meds:.    Allergies   Allergen Reactions   • Bactrim [Sulfamethoxazole-Trimethoprim] GI Intolerance   • Doxycycline GI Intolerance   • Fosamax [Alendronate] GI Intolerance   • Lactose Intolerance (Gi) GI Intolerance   • Macrodantin [Nitrofurantoin] GI Intolerance   • Naproxen GI Intolerance   • Zestril [Lisinopril] GI Intolerance       Social History     Socioeconomic History   • Marital status:      Spouse name: Not on file   • Number of children: Not on file   • Years of education: Not on file   • Highest education level: Not on file   Tobacco Use   • Smoking status: Never Smoker   • Smokeless tobacco: Never Used   Substance and Sexual Activity   • Alcohol use: Not Currently   • Drug use: Not Currently   • Sexual activity: Defer       History reviewed. No pertinent family history.    Review of Systems   Constitutional: Negative for appetite change, chills, diaphoresis, fatigue, fever and unexpected weight change.   HENT: Negative for nosebleeds, postnasal drip, sore throat, trouble swallowing and voice change.    Respiratory: Negative for cough, choking, chest tightness, shortness of breath and wheezing.    Cardiovascular: Negative  for chest pain, palpitations and leg swelling.   Gastrointestinal: Positive for abdominal distention, abdominal pain, constipation and diarrhea. Negative for anal bleeding, blood in stool, nausea, rectal pain and vomiting.   Endocrine: Negative for polydipsia, polyphagia and polyuria.   Musculoskeletal: Negative for gait problem.   Skin: Negative for rash and wound.   Allergic/Immunologic: Negative for food allergies.   Neurological: Negative for dizziness, speech difficulty and light-headedness.   Psychiatric/Behavioral: Negative for confusion, self-injury, sleep disturbance and suicidal ideas.       Vitals:    10/08/21 0940   Temp: 97.1 °F (36.2 °C)       Physical Exam  Constitutional:       General: She is not in acute distress.     Appearance: She is well-developed. She is not ill-appearing.   HENT:      Head: Normocephalic.   Eyes:      Pupils: Pupils are equal, round, and reactive to light.   Cardiovascular:      Rate and Rhythm: Normal rate and regular rhythm.      Heart sounds: Normal heart sounds.   Pulmonary:      Effort: Pulmonary effort is normal.      Breath sounds: Normal breath sounds.   Abdominal:      General: Bowel sounds are normal. There is no distension.      Palpations: Abdomen is soft. There is no mass.      Tenderness: There is no abdominal tenderness. There is no guarding or rebound.      Hernia: No hernia is present.   Musculoskeletal:         General: Normal range of motion.   Skin:     General: Skin is warm and dry.   Neurological:      Mental Status: She is alert and oriented to person, place, and time.   Psychiatric:         Speech: Speech normal.         Behavior: Behavior normal.         Judgment: Judgment normal.         No radiology results for the last 7 days     Diagnoses and all orders for this visit:    1. Epigastric pain (Primary)    2. Lower abdominal pain    3. Irritable bowel syndrome with both constipation and diarrhea    4. Gastroesophageal reflux disease, unspecified  whether esophagitis present       Reviewed hospital records with her today.  Reviewed most recent labs and imaging results as well.  She has a couple different problems going on at the same time.  GERD is not well controlled on Protonix 40 mg so I would like her to add Pepcid AC complete to before bedtime.  Continue GERD precautions.  Sounds like she has IBS having more issues with constipation than diarrhea probably secondary to her tramadol.  I would like her to increase the fiber supplement to 2 daily and see how that goes.  May need to consider EGD with flexible sigmoidoscopy for further evaluation if these med changes do not work. Labs were stable.  CT scan was negative for any acute abdominal process.  Patient's daughter to call the office next week with an update.  Patient to follow-up with me in 2 weeks.  Patient is agreeable to the plan.

## 2021-10-14 ENCOUNTER — TELEPHONE (OUTPATIENT)
Dept: GASTROENTEROLOGY | Facility: CLINIC | Age: 86
End: 2021-10-14

## 2021-10-14 NOTE — TELEPHONE ENCOUNTER
"Shamar, Jareth'Brittani, RegSched Rep  [facility] [address]  Phone Number: [phone]     Patient's daughter Mary wants to speak with Sadie in regards to the medication the patient just got on. She said the patient is still getting colon spasms and they have gotten worse. Please give patient's daughter a call back.       Called pt's daughter with an update on her mother.  She states that her mother has been taking the pantoprazole in the am and pecid at pm and her reflux may be slightly better.  However her mother continues to have \"colon spasms\" .  She is also report lots of pain in her rectum.  She denies constipation.   She is asking what can her mother take for the colon spasms.  ADvised will send message to Sadie TREVIZO.   "

## 2021-10-15 NOTE — TELEPHONE ENCOUNTER
Call to daughter Mary.  Advise per LEILANI Roman note.  Verb understanding.  States has appt next wk.  Will try IBGard and discuss with appt.

## 2021-10-15 NOTE — TELEPHONE ENCOUNTER
I would make sure that they do increase the fiber to twice daily but I think with her being on the tramadol it slowing her bowel movements down and causing enough pressure that is causing the spasms in her rectum and her colon.  We do have muscle relaxers for the diet but given her age and other health history I really do not feel safe giving those to her right now.  So for now I would increase the fiber to twice daily and try some IBgard over-the-counter.  She can increase the Pepcid to 40 mg at night if she needs to.  Have her daughter call us back early next week with an update.

## 2021-10-22 ENCOUNTER — OFFICE VISIT (OUTPATIENT)
Dept: GASTROENTEROLOGY | Facility: CLINIC | Age: 86
End: 2021-10-22

## 2021-10-22 VITALS — BODY MASS INDEX: 17.11 KG/M2 | TEMPERATURE: 97.3 F | WEIGHT: 90.6 LBS | HEIGHT: 61 IN

## 2021-10-22 DIAGNOSIS — K21.9 GASTROESOPHAGEAL REFLUX DISEASE WITHOUT ESOPHAGITIS: ICD-10-CM

## 2021-10-22 DIAGNOSIS — K58.2 IRRITABLE BOWEL SYNDROME WITH BOTH CONSTIPATION AND DIARRHEA: ICD-10-CM

## 2021-10-22 DIAGNOSIS — K62.89 ANAL OR RECTAL PAIN: Primary | ICD-10-CM

## 2021-10-22 DIAGNOSIS — R10.30 LOWER ABDOMINAL PAIN: ICD-10-CM

## 2021-10-22 PROCEDURE — 99214 OFFICE O/P EST MOD 30 MIN: CPT | Performed by: NURSE PRACTITIONER

## 2021-10-22 NOTE — PROGRESS NOTES
Chief Complaint   Patient presents with   • Heartburn   • Irritable Bowel Syndrome   • Abdominal Pain       Bo Adan is a  89 y.o. female here for a follow up visit for rectal pain.    HPI  89-year-old female presents today accompanied by her daughter for follow-up visit for rectal pain and IBS.  She is a patient of Dr. Fernández.  She was last seen in the office by me on 10/8/2021.  She has a history of IBS with both constipation and diarrhea.  She tells me she is really been struggling lately with either constipation or diarrhea.  When she tends to be more backed up she has a lot of rectal pain and spasms and what she describes as burning in her rectum.  She tells me she will also have lower abdominal pain and cramping when she has not had a bowel movement.  She tells me when she does have a bowel movement sometimes it is really hard or she ends up with a lot of diarrhea.  She did increase her daily fiber supplement to 2 a day and this unfortunately sounds like it only made things worse for her.  She tells me it made the rectal spasms worse and her stools were harder to come out.  She tells me in the past she has taken MiraLAX once a day and it caused too much diarrhea so she quit taking it.  She tells me if she eats oatmeal that sometimes helps.  She also wonders if maybe her coffee could be aggravating things.  She also has a history of GERD and admits the addition of Pepcid at night has been helping.  So she has been doing Protonix 40 in the morning and Pepcid 40 at night.  He had a CT scan when she was hospitalized recently that showed enteritis and proctitis.  Stool studies at that time were negative.  She then ended up with a repeat CT scan this past August that was completely normal.  She did try some IBgard over-the-counter and she says this only made her reflux symptoms worse and did not help her IBS at all.  The patient tells me she really does not want any scopes if she does not have to.  She tells me  her appetite is good and her weight has been steady.  She denies any dysphagia, nausea and vomiting, rectal bleeding or melena.  She has had an EGD and colonoscopy in the past and she thinks it is at least been more than 10 years since her previous scopes.  Past Medical History:   Diagnosis Date   • Arthritis    • Cancer (HCC)     cervical CA 2018 finished radiation   • Elevated cholesterol    • Enteritis    • GERD (gastroesophageal reflux disease)    • Hypertension        Past Surgical History:   Procedure Laterality Date   • COLONOSCOPY     • ENDOSCOPY     • FRACTURE SURGERY      feb 23 2021       Scheduled Meds:    Continuous Infusions:No current facility-administered medications for this visit.      PRN Meds:.    Allergies   Allergen Reactions   • Bactrim [Sulfamethoxazole-Trimethoprim] GI Intolerance   • Doxycycline GI Intolerance   • Fosamax [Alendronate] GI Intolerance   • Lactose Intolerance (Gi) GI Intolerance   • Macrodantin [Nitrofurantoin] GI Intolerance   • Naproxen GI Intolerance   • Zestril [Lisinopril] GI Intolerance       Social History     Socioeconomic History   • Marital status:    Tobacco Use   • Smoking status: Never Smoker   • Smokeless tobacco: Never Used   Substance and Sexual Activity   • Alcohol use: Not Currently   • Drug use: Not Currently   • Sexual activity: Defer       History reviewed. No pertinent family history.    Review of Systems   Constitutional: Negative for appetite change, chills, diaphoresis, fatigue, fever and unexpected weight change.   HENT: Negative for nosebleeds, postnasal drip, sore throat, trouble swallowing and voice change.    Respiratory: Negative for cough, choking, chest tightness, shortness of breath, wheezing and stridor.    Cardiovascular: Negative for chest pain, palpitations and leg swelling.   Gastrointestinal: Positive for abdominal distention, abdominal pain, constipation, diarrhea and rectal pain. Negative for anal bleeding, blood in stool,  nausea and vomiting.   Endocrine: Negative for polydipsia, polyphagia and polyuria.   Musculoskeletal: Negative for gait problem.   Skin: Negative for rash and wound.   Allergic/Immunologic: Negative for food allergies.   Neurological: Negative for dizziness, speech difficulty and light-headedness.   Psychiatric/Behavioral: Negative for confusion, self-injury, sleep disturbance and suicidal ideas.       Vitals:    10/22/21 1050   Temp: 97.3 °F (36.3 °C)       Physical Exam  Constitutional:       General: She is not in acute distress.     Appearance: She is well-developed. She is not ill-appearing.   HENT:      Head: Normocephalic.   Eyes:      Pupils: Pupils are equal, round, and reactive to light.   Cardiovascular:      Rate and Rhythm: Normal rate and regular rhythm.      Heart sounds: Normal heart sounds.   Pulmonary:      Effort: Pulmonary effort is normal.      Breath sounds: Normal breath sounds.   Abdominal:      General: Bowel sounds are normal. There is no distension.      Palpations: Abdomen is soft. There is no mass.      Tenderness: There is no abdominal tenderness. There is no guarding or rebound.      Hernia: No hernia is present.      Comments: Rectal exam was negative.  Hemoccult stool test was negative.  She did have a lot of redness on both buttocks due to just having very thin skin and a very thin fat pad back there.  No obvious skin breakdown was noted.   Musculoskeletal:         General: Normal range of motion.   Skin:     General: Skin is warm and dry.   Neurological:      Mental Status: She is alert and oriented to person, place, and time.   Psychiatric:         Speech: Speech normal.         Behavior: Behavior normal.         Judgment: Judgment normal.         No radiology results for the last 7 days     Diagnoses and all orders for this visit:    1. Anal or rectal pain (Primary)    2. Irritable bowel syndrome with both constipation and diarrhea    3. Gastroesophageal reflux disease without  esophagitis    4. Lower abdominal pain    She did have a reassuringly normal CT scan this past August.  Rectal exam today was negative.  Hemoccult stool test was negative.  At this point its been very tricky to regulate her medicines to get her a normal bowel movement.  It sounds like she is either getting too much or not enough to help her stay more regular.  It sounds like the fiber definitely made her rectal spasms worse and it made it harder for her to pass her stool.  So at this point we will stop the fiber supplementation.  I would like her to trial half a dose of MiraLAX daily or every other day.  Continue her morning oatmeal.  We will see if that helps her empty better because it does sound like on days that she actually does have a really good bowel movement the spasms in the abdominal pain are much better.  GERD seems better on the Protonix 40 mg every morning and the Pepcid 40 mg at night.  I did tell her that she could increase the Pepcid to twice daily if she needs to.  Continue GERD precautions.  Patient's daughter to call the office next week with an update.  Patient to follow-up with me in 3 to 4 weeks.  Patient is agreeable to the plan.

## 2021-11-01 ENCOUNTER — TELEPHONE (OUTPATIENT)
Dept: GASTROENTEROLOGY | Facility: CLINIC | Age: 86
End: 2021-11-01

## 2021-11-01 NOTE — TELEPHONE ENCOUNTER
----- Message from Larry Marrero sent at 11/1/2021  8:16 AM EDT -----  Regarding: pt update  Contact: [phone]  Patient's daughter calling saying her mother's problems are getting worse and not better. Please give Mary a call back.

## 2021-11-01 NOTE — TELEPHONE ENCOUNTER
I have seen this patient multiple times for the same issues.  I have done a rectal exam which was unremarkable and a Hemoccult stool test which was negative.  I am not sure at this point what more to do for her.  I really would like her to go to the ER for further evaluation but the patient and the daughter refused.  I would really want them to see Dr. Fernández for further evaluation.  Is there any way we can get her in with him anytime soon?

## 2021-11-01 NOTE — TELEPHONE ENCOUNTER
"Returned call to daughter and she reports that she is having lots of problems with spasms of her colon.  She is having a lot of abd pain.  She reports that the spasms have gotten a lot worse. She reports a lot more pain in her rectum.  She reports that she feels like something is in her rectum that needs to come out. She describes this as something burning like a \"hot poker\".  Pt is having bm's routinely that are soft .  Pt not straining to have bm's.  Pt has been using the miralax daily.  She states that this has not helped. She reports that her mother reports that this pain occurs all day. Pain is worse when she lays down and has to get up and walk around. Reflux symptoms slightly better but the rectal pain is really bothering her.   Advised will send message to Sadie Np and in the meantime if her mother's pain worsens advised to go to the ER. She verb understanding but states they do not want to go to the ER.   "

## 2021-11-01 NOTE — TELEPHONE ENCOUNTER
"Call to Mary.  Advise per LEILANI Roman note.  Verb understanding.  Very frustrated - states will not go to ER \"because every time we do that, she winds up in the hospital and further deteriorates\".      F/u appt scheduled with Dr carpenter for 11/24 @ 2:15.      Mary requesting yumi back from Manager.   "

## 2021-11-02 ENCOUNTER — TELEPHONE (OUTPATIENT)
Dept: GASTROENTEROLOGY | Facility: CLINIC | Age: 86
End: 2021-11-02

## 2021-11-02 ENCOUNTER — OFFICE VISIT (OUTPATIENT)
Dept: GASTROENTEROLOGY | Facility: CLINIC | Age: 86
End: 2021-11-02

## 2021-11-02 VITALS — WEIGHT: 92 LBS | HEIGHT: 60 IN | BODY MASS INDEX: 18.06 KG/M2

## 2021-11-02 DIAGNOSIS — K62.89 ANAL OR RECTAL PAIN: Primary | ICD-10-CM

## 2021-11-02 DIAGNOSIS — K21.9 GASTROESOPHAGEAL REFLUX DISEASE WITHOUT ESOPHAGITIS: ICD-10-CM

## 2021-11-02 DIAGNOSIS — K58.2 IRRITABLE BOWEL SYNDROME WITH BOTH CONSTIPATION AND DIARRHEA: ICD-10-CM

## 2021-11-02 PROCEDURE — 99214 OFFICE O/P EST MOD 30 MIN: CPT | Performed by: NURSE PRACTITIONER

## 2021-11-02 NOTE — PROGRESS NOTES
Chief Complaint   Patient presents with   • Rectal Pain       Bo Adan is a  89 y.o. female here for a telephone follow up visit for rectal pain.    HPI  89-year-old female presents today for telephone follow-up visit for rectal pain.  She is a patient of Dr. Fernández.  She was last seen in the office by me on 10/22/2021.You have chosen to receive care through a telephone visit. Do you consent to use a telephone visit for your medical care today? YES.    Today's telephone visit was actually with the patient's daughter since she is her primary caregiver.  The patient's daughter tells me that over the weekend her mother had severe rectal pain that would last all day and cause her headaches.  The pain was only better if she got up and walked around or put her finger in her rectum and tried to massage her rectum.  She tells me for the past 2 days the patient is actually been telling her that she has been fine and she has not had any episodes of the rectal pain or pressure.  The patient does have a history of IBS type and mixed and has been trying to figure out the best medications to get her bowels to move more regularly.  She has days of constipation and then days of diarrhea.  She tried the fiber supplementation and this caused too much diarrhea.  She is also been trying MiraLAX just to half a dose and its definitely making her go more but sometimes it can cause her to be too loose as well.  She had a rectal exam that I performed last visit that was normal.  Hemoccult stool test was also negative.  She had a CT scan of the abdomen and pelvis done this past August which was normal.  She had an EGD and colonoscopy done at least 10 years ago.  The patient does have a history of cervical cancer that had to undergo radiation in 2018.  Patient also has a history of GERD but her daughter tells me she has been doing really well on the pantoprazole 40 mg daily.  She denies any breakthrough reflux at this time.  She does  have chronic arthritis pain and takes tramadol 3 times a day.  She also takes a daily baby aspirin.  The daughter tells me the patient denies any reflux, dysphagia, nausea and vomiting, rectal bleeding or melena.  Her daughter tells me the patient has been eating pretty good and her weight appears stable.  She does live alone but the daughter has hired caregivers that comes in and out of the house several days a week for the patient.    Past Medical History:   Diagnosis Date   • Arthritis    • Cancer (HCC)     cervical CA 2018 finished radiation   • Elevated cholesterol    • Enteritis    • GERD (gastroesophageal reflux disease)    • Hypertension        Past Surgical History:   Procedure Laterality Date   • COLONOSCOPY     • ENDOSCOPY     • FRACTURE SURGERY      feb 23 2021       Scheduled Meds:    Continuous Infusions:No current facility-administered medications for this visit.      PRN Meds:.    Allergies   Allergen Reactions   • Bactrim [Sulfamethoxazole-Trimethoprim] GI Intolerance   • Doxycycline GI Intolerance   • Fosamax [Alendronate] GI Intolerance   • Lactose Intolerance (Gi) GI Intolerance   • Macrodantin [Nitrofurantoin] GI Intolerance   • Naproxen GI Intolerance   • Zestril [Lisinopril] GI Intolerance       Social History     Socioeconomic History   • Marital status:    Tobacco Use   • Smoking status: Never Smoker   • Smokeless tobacco: Never Used   Substance and Sexual Activity   • Alcohol use: Not Currently   • Drug use: Not Currently   • Sexual activity: Defer       History reviewed. No pertinent family history.    Review of Systems   Constitutional: Negative for appetite change, chills, diaphoresis, fatigue, fever and unexpected weight change.   HENT: Negative for nosebleeds, postnasal drip, sore throat, trouble swallowing and voice change.    Respiratory: Negative for cough, choking, chest tightness, shortness of breath, wheezing and stridor.    Cardiovascular: Negative for chest pain,  palpitations and leg swelling.   Gastrointestinal: Positive for constipation, diarrhea and rectal pain. Negative for abdominal distention, abdominal pain, anal bleeding, blood in stool, nausea and vomiting.   Endocrine: Negative for polydipsia, polyphagia and polyuria.   Musculoskeletal: Negative for gait problem.   Skin: Negative for rash and wound.   Allergic/Immunologic: Negative for food allergies.   Neurological: Negative for dizziness, speech difficulty and light-headedness.   Psychiatric/Behavioral: Negative for confusion, self-injury, sleep disturbance and suicidal ideas.       There were no vitals filed for this visit.    Physical Exam  Constitutional:       General: She is not in acute distress.        No radiology results for the last 7 days     Diagnoses and all orders for this visit:    1. Anal or rectal pain (Primary)    2. Irritable bowel syndrome with both constipation and diarrhea    3. Gastroesophageal reflux disease without esophagitis    Today's visit was done over the telephone with the patient's daughter.  Total time on the phone with the patient's daughter was 30 minutes.  Earlier today I have sent a message to Dr. Fernández for him to look at the patient's chart and read up on the most recent notes and hospitalizations and records and come up with some recommendations for the patient.  At this time he recommends sitz bath's twice daily with Anusol rectal suppositories twice daily.  When I relayed this recommendation to the patient's daughter today over the phone she let me know that it would be very hard for the patient to do any sitz bath's since she has trouble walking and lives alone.  But the daughter is willing to try the Anusol rectal suppositories twice daily.  She plans to go today to the pharmacy and pick some up.  I did advise her to call the office Friday with an update.  Again I reiterated if the patient were to get worse for her to go on over to the [facility] for immediate  evaluation.  The daughter was adamant that they do not want to take their mother the patient to the ER under any circumstances if at all possible.  The patient does have follow-up with Dr. Fernández scheduled in 2 weeks.  The daughter promises she will call me on Friday with an update.  She is agreeable to the plan.

## 2021-11-02 NOTE — TELEPHONE ENCOUNTER
----- Message from Larry Mendez Rep sent at 11/2/2021  2:28 PM EDT -----  Regarding: ANTISOL HC  Contact: [phone]  Pt daughter Mary said Sadie told her to get Antisol HC but the pharmacy said that was by prescription only so she got the regular antisol. Is that ok?

## 2021-11-02 NOTE — TELEPHONE ENCOUNTER
"Per LEILANI Roman, otc anusol ok.      Call to Mary.  Advise of above.  States understood that needs to be suppositories and pharmacist has advised her that cannot get prep h supp with hydrocortisone - only comes in cream form.    Mary asking if should have specifically suppository, and if so, could rx be sent to compounding pharmacy such as [facility] or [facility] compounding.      Advise Mary that prescription supp may be expensive.      Advise that LEILANI Roman gone for the day - will send message to be addressed tomorrow.  Mary verb understanding and accepting of this  Tearful - states \"I'm just trying to help my mom as best I can\".    "

## 2021-11-03 RX ORDER — HYDROCORTISONE ACETATE 25 MG/1
25 SUPPOSITORY RECTAL 2 TIMES DAILY
Qty: 60 SUPPOSITORY | Refills: 1 | Status: SHIPPED | OUTPATIENT
Start: 2021-11-03

## 2021-11-03 RX ORDER — HYDROCORTISONE ACETATE 25 MG/1
25 SUPPOSITORY RECTAL NIGHTLY PRN
Qty: 30 SUPPOSITORY | Refills: 1 | Status: SHIPPED | OUTPATIENT
Start: 2021-11-03 | End: 2021-11-03

## 2021-11-03 NOTE — TELEPHONE ENCOUNTER
Please call the daughter and let her know I have sent in a prescription to [facility] for the rectal suppositories.  Dr. Fernández really wanted it to be the suppository and not the cream.  Sometimes these prescription suppositories can be quite expensive however.  Have her call us back if she has any issues getting them.  Thanks

## 2021-11-24 ENCOUNTER — OFFICE VISIT (OUTPATIENT)
Dept: GASTROENTEROLOGY | Facility: CLINIC | Age: 86
End: 2021-11-24

## 2021-11-24 VITALS
BODY MASS INDEX: 17.67 KG/M2 | HEART RATE: 82 BPM | HEIGHT: 60 IN | SYSTOLIC BLOOD PRESSURE: 170 MMHG | TEMPERATURE: 96.4 F | WEIGHT: 90 LBS | DIASTOLIC BLOOD PRESSURE: 89 MMHG | OXYGEN SATURATION: 94 %

## 2021-11-24 DIAGNOSIS — K21.9 GASTROESOPHAGEAL REFLUX DISEASE WITHOUT ESOPHAGITIS: ICD-10-CM

## 2021-11-24 DIAGNOSIS — R63.4 WEIGHT LOSS: ICD-10-CM

## 2021-11-24 DIAGNOSIS — K62.89 ANAL OR RECTAL PAIN: Primary | ICD-10-CM

## 2021-11-24 DIAGNOSIS — K58.2 IRRITABLE BOWEL SYNDROME WITH BOTH CONSTIPATION AND DIARRHEA: ICD-10-CM

## 2021-11-24 PROCEDURE — 99214 OFFICE O/P EST MOD 30 MIN: CPT | Performed by: INTERNAL MEDICINE

## 2021-11-24 NOTE — PROGRESS NOTES
Chief Complaint   Patient presents with   • Diarrhea   • Abdominal Pain   • rectal spasm   • Rectal Pain       History of Present Illness:   89 y.o. female who saw Ms. Roman earlier this month with rectal pain x 2 mos. The Anusol HC suppositories help but they are very expensive. She has diarrhea sometimes.She has constipation occasionally.  She has stomach pain all the time. She had a CT abd/pelvis in 8/21. She had XRT in 2018 for cervical cancer. She has always had alternating diarrhea and consitpation. No rectal bleeding or melena. No nausea or ovmiting. NO fevers, chills. She has lost 35 pounds over 2 yrs.        She had an EGD and colonoscopy done at least 10 years ago.    Past Medical History:   Diagnosis Date   • Arthritis    • Cancer (HCC)     cervical CA 2018 finished radiation   • Elevated cholesterol    • Enteritis    • GERD (gastroesophageal reflux disease)    • Hypertension        Past Surgical History:   Procedure Laterality Date   • CHOLECYSTECTOMY     • COLONOSCOPY     • ENDOSCOPY     • FRACTURE SURGERY      feb 23 2021         Current Outpatient Medications:   •  acetaminophen (TYLENOL) 500 MG tablet, Take 1,000 mg by mouth Every 6 (Six) Hours As Needed for Mild Pain ., Disp: , Rfl:   •  amLODIPine (NORVASC) 5 MG tablet, Take 5 mg by mouth Daily., Disp: , Rfl:   •  aspirin 81 MG EC tablet, Take 81 mg by mouth Daily., Disp: , Rfl:   •  atorvastatin (LIPITOR) 40 MG tablet, Take 40 mg by mouth Daily., Disp: , Rfl:   •  carvedilol (COREG) 25 MG tablet, Take 25 mg by mouth., Disp: , Rfl:   •  estradiol (VAGIFEM) 10 MCG tablet vaginal tablet, Insert 1 tablet into the vagina 2 (Two) Times a Week., Disp: , Rfl:   •  folic acid (FOLVITE) 1 MG tablet, Take 1 mg by mouth Daily., Disp: , Rfl:   •  hydrocortisone (ANUSOL-HC) 25 MG suppository, Insert 1 suppository into the rectum 2 (Two) Times a Day., Disp: 60 suppository, Rfl: 1  •  latanoprost (XALATAN) 0.005 % ophthalmic solution, 1 drop Every Night., Disp: ,  Rfl:   •  Melatonin 1 MG/4ML liquid, 5 mg., Disp: , Rfl:   •  multivitamin with minerals (MULTIVITAMIN ADULT PO), Take 1 tablet by mouth Daily., Disp: , Rfl:   •  pantoprazole (PROTONIX) 40 MG EC tablet, Take 40 mg by mouth Daily., Disp: , Rfl:   •  timolol (TIMOPTIC) 0.25 % ophthalmic solution, 1 drop 2 (Two) Times a Day., Disp: , Rfl:   •  traMADol (ULTRAM) 50 MG tablet, Take 50 mg by mouth Every 8 (Eight) Hours As Needed for Moderate Pain ., Disp: , Rfl:   •  traZODone (DESYREL) 50 MG tablet, Take 50 mg by mouth Every Night., Disp: , Rfl:   •  vitamin B-12 (CYANOCOBALAMIN) 1000 MCG tablet, Take 1,000 mcg by mouth Daily., Disp: , Rfl:   •  carvedilol (COREG) 25 MG tablet, Take 1 tablet by mouth 2 (Two) Times a Day With Meals for 30 days., Disp: 60 tablet, Rfl: 0    Allergies   Allergen Reactions   • Bactrim [Sulfamethoxazole-Trimethoprim] GI Intolerance   • Doxycycline GI Intolerance   • Fosamax [Alendronate] GI Intolerance   • Lactose Intolerance (Gi) GI Intolerance   • Macrodantin [Nitrofurantoin] GI Intolerance   • Naproxen GI Intolerance   • Zestril [Lisinopril] GI Intolerance       History reviewed. No pertinent family history.    Social History     Socioeconomic History   • Marital status:    Tobacco Use   • Smoking status: Never Smoker   • Smokeless tobacco: Never Used   Substance and Sexual Activity   • Alcohol use: Not Currently   • Drug use: Not Currently   • Sexual activity: Defer       Review of Systems   Gastrointestinal: Positive for rectal pain. Negative for abdominal pain.   All other systems reviewed and are negative.    Pertinent positives and negatives documented in the HPI and all other systems reviewed and were found to be negative.  Vitals:    11/24/21 1416   BP: 170/89   Pulse: 82   Temp: 96.4 °F (35.8 °C)   SpO2: 94%       Physical Exam  Vitals reviewed.   Constitutional:       General: She is not in acute distress.     Appearance: Normal appearance. She is well-developed. She is not  diaphoretic.   HENT:      Head: Normocephalic and atraumatic. Hair is normal.      Right Ear: Hearing, tympanic membrane, ear canal and external ear normal. No decreased hearing noted. No drainage.      Left Ear: Hearing, tympanic membrane, ear canal and external ear normal. No decreased hearing noted.      Nose: Nose normal. No nasal deformity.      Mouth/Throat:      Mouth: Mucous membranes are moist.   Eyes:      General: Lids are normal.         Right eye: No discharge.         Left eye: No discharge.      Extraocular Movements: Extraocular movements intact.      Conjunctiva/sclera: Conjunctivae normal.      Pupils: Pupils are equal, round, and reactive to light.   Neck:      Thyroid: No thyromegaly.      Vascular: No JVD.      Trachea: No tracheal deviation.   Cardiovascular:      Rate and Rhythm: Normal rate and regular rhythm.      Pulses: Normal pulses.      Heart sounds: Normal heart sounds. No murmur heard.  No friction rub. No gallop.    Pulmonary:      Effort: Pulmonary effort is normal. No respiratory distress.      Breath sounds: Normal breath sounds. No wheezing or rales.   Chest:      Chest wall: No tenderness.   Abdominal:      General: Bowel sounds are normal. There is no distension.      Palpations: Abdomen is soft. There is no mass.      Tenderness: There is no abdominal tenderness. There is no guarding or rebound.      Hernia: No hernia is present.   Genitourinary:     Rectum: Normal. Guaiac result negative.   Musculoskeletal:         General: No tenderness or deformity. Normal range of motion.      Cervical back: Normal range of motion and neck supple.   Lymphadenopathy:      Cervical: No cervical adenopathy.   Skin:     General: Skin is warm and dry.      Findings: No erythema or rash.   Neurological:      Mental Status: She is alert and oriented to person, place, and time.      Cranial Nerves: No cranial nerve deficit.      Motor: No abnormal muscle tone.      Coordination: Coordination  normal.      Deep Tendon Reflexes: Reflexes are normal and symmetric. Reflexes normal.   Psychiatric:         Mood and Affect: Mood normal.         Behavior: Behavior normal.         Thought Content: Thought content normal.         Judgment: Judgment normal.         Diagnoses and all orders for this visit:    1. Anal or rectal pain (Primary)    2. Irritable bowel syndrome with both constipation and diarrhea    3. Gastroesophageal reflux disease without esophagitis    4. Weight loss      Assessment:  1. Rectal pain, better with Anusol HC Suppositories  2. Alternating diarhea and constipation.  3. She is lactose intolerant.   4. Weight loss.    Recommendations:  1. Take anusol HC suppositories 1/day.  2. Take fiber one/day.  3. She prefers no colonoscopy now.   4. Labs: Celiac sprue antibody panel. She doesn't want this test now.   5. F/u 8 weeks.   6. Take a supplement daily. She doesn't want to.     Return in about 8 weeks (around 1/19/2022).    Feliberto Fernández MD  11/24/2021

## 2022-01-07 PROCEDURE — 36415 COLL VENOUS BLD VENIPUNCTURE: CPT

## 2022-01-07 PROCEDURE — 99284 EMERGENCY DEPT VISIT MOD MDM: CPT

## 2022-01-08 ENCOUNTER — APPOINTMENT (OUTPATIENT)
Dept: GENERAL RADIOLOGY | Facility: HOSPITAL | Age: 87
End: 2022-01-08

## 2022-01-08 ENCOUNTER — APPOINTMENT (OUTPATIENT)
Dept: CT IMAGING | Facility: HOSPITAL | Age: 87
End: 2022-01-08

## 2022-01-08 ENCOUNTER — HOSPITAL ENCOUNTER (INPATIENT)
Facility: HOSPITAL | Age: 87
LOS: 5 days | Discharge: HOME HEALTH | End: 2022-01-13
Attending: EMERGENCY MEDICINE | Admitting: HOSPITALIST

## 2022-01-08 DIAGNOSIS — R10.9 ABDOMINAL PAIN, UNSPECIFIED ABDOMINAL LOCATION: ICD-10-CM

## 2022-01-08 DIAGNOSIS — E78.5 HYPERLIPIDEMIA, UNSPECIFIED HYPERLIPIDEMIA TYPE: ICD-10-CM

## 2022-01-08 DIAGNOSIS — E83.42 HYPOMAGNESEMIA: ICD-10-CM

## 2022-01-08 DIAGNOSIS — D72.829 LEUKOCYTOSIS, UNSPECIFIED TYPE: ICD-10-CM

## 2022-01-08 DIAGNOSIS — I10 HYPERTENSION, UNSPECIFIED TYPE: ICD-10-CM

## 2022-01-08 DIAGNOSIS — U07.1 COVID-19 VIRUS INFECTION: ICD-10-CM

## 2022-01-08 DIAGNOSIS — R54 AGE-RELATED PHYSICAL DEBILITY: ICD-10-CM

## 2022-01-08 DIAGNOSIS — J18.9 PNEUMONIA OF RIGHT LUNG DUE TO INFECTIOUS ORGANISM, UNSPECIFIED PART OF LUNG: Primary | ICD-10-CM

## 2022-01-08 LAB
ADV 40+41 DNA STL QL NAA+NON-PROBE: NOT DETECTED
ALBUMIN SERPL-MCNC: 3.3 G/DL (ref 3.5–5.2)
ALBUMIN SERPL-MCNC: 3.7 G/DL (ref 3.5–5.2)
ALBUMIN/GLOB SERPL: 1.3 G/DL
ALBUMIN/GLOB SERPL: 1.4 G/DL
ALP SERPL-CCNC: 73 U/L (ref 39–117)
ALP SERPL-CCNC: 82 U/L (ref 39–117)
ALT SERPL W P-5'-P-CCNC: 15 U/L (ref 1–33)
ALT SERPL W P-5'-P-CCNC: 16 U/L (ref 1–33)
ANION GAP SERPL CALCULATED.3IONS-SCNC: 13.2 MMOL/L (ref 5–15)
ANION GAP SERPL CALCULATED.3IONS-SCNC: 9.6 MMOL/L (ref 5–15)
ANION GAP SERPL CALCULATED.3IONS-SCNC: 9.7 MMOL/L (ref 5–15)
AST SERPL-CCNC: 17 U/L (ref 1–32)
AST SERPL-CCNC: 22 U/L (ref 1–32)
ASTRO TYP 1-8 RNA STL QL NAA+NON-PROBE: NOT DETECTED
B PARAPERT DNA SPEC QL NAA+PROBE: NOT DETECTED
B PERT DNA SPEC QL NAA+PROBE: NOT DETECTED
BACTERIA UR QL AUTO: NORMAL /HPF
BASOPHILS # BLD AUTO: 0.04 10*3/MM3 (ref 0–0.2)
BASOPHILS NFR BLD AUTO: 0.2 % (ref 0–1.5)
BILIRUB SERPL-MCNC: 0.2 MG/DL (ref 0–1.2)
BILIRUB SERPL-MCNC: 0.3 MG/DL (ref 0–1.2)
BILIRUB UR QL STRIP: NEGATIVE
BUN SERPL-MCNC: 13 MG/DL (ref 8–23)
BUN SERPL-MCNC: 7 MG/DL (ref 8–23)
BUN SERPL-MCNC: 9 MG/DL (ref 8–23)
BUN/CREAT SERPL: 20 (ref 7–25)
BUN/CREAT SERPL: 20.5 (ref 7–25)
BUN/CREAT SERPL: 32.5 (ref 7–25)
C CAYETANENSIS DNA STL QL NAA+NON-PROBE: NOT DETECTED
C COLI+JEJ+UPSA DNA STL QL NAA+NON-PROBE: NOT DETECTED
C DIFF TOX GENS STL QL NAA+PROBE: NEGATIVE
C PNEUM DNA NPH QL NAA+NON-PROBE: NOT DETECTED
CALCIUM SPEC-SCNC: 8.5 MG/DL (ref 8.2–9.6)
CALCIUM SPEC-SCNC: 8.6 MG/DL (ref 8.2–9.6)
CALCIUM SPEC-SCNC: 8.8 MG/DL (ref 8.2–9.6)
CHLORIDE SERPL-SCNC: 91 MMOL/L (ref 98–107)
CHLORIDE SERPL-SCNC: 97 MMOL/L (ref 98–107)
CHLORIDE SERPL-SCNC: 97 MMOL/L (ref 98–107)
CHLORIDE UR-SCNC: 23 MMOL/L
CLARITY UR: CLEAR
CO2 SERPL-SCNC: 24.8 MMOL/L (ref 22–29)
CO2 SERPL-SCNC: 28.3 MMOL/L (ref 22–29)
CO2 SERPL-SCNC: 29.4 MMOL/L (ref 22–29)
COLOR UR: YELLOW
CREAT SERPL-MCNC: 0.35 MG/DL (ref 0.57–1)
CREAT SERPL-MCNC: 0.4 MG/DL (ref 0.57–1)
CREAT SERPL-MCNC: 0.44 MG/DL (ref 0.57–1)
CRP SERPL-MCNC: 7.15 MG/DL (ref 0–0.5)
CRYPTOSP DNA STL QL NAA+NON-PROBE: NOT DETECTED
D-LACTATE SERPL-SCNC: 1.1 MMOL/L (ref 0.5–2)
DEPRECATED RDW RBC AUTO: 45.6 FL (ref 37–54)
DEPRECATED RDW RBC AUTO: 45.7 FL (ref 37–54)
E HISTOLYT DNA STL QL NAA+NON-PROBE: NOT DETECTED
EAEC PAA PLAS AGGR+AATA ST NAA+NON-PRB: NOT DETECTED
EC STX1+STX2 GENES STL QL NAA+NON-PROBE: NOT DETECTED
EOSINOPHIL # BLD AUTO: 0 10*3/MM3 (ref 0–0.4)
EOSINOPHIL NFR BLD AUTO: 0 % (ref 0.3–6.2)
EPEC EAE GENE STL QL NAA+NON-PROBE: NOT DETECTED
ERYTHROCYTE [DISTWIDTH] IN BLOOD BY AUTOMATED COUNT: 13.1 % (ref 12.3–15.4)
ERYTHROCYTE [DISTWIDTH] IN BLOOD BY AUTOMATED COUNT: 13.3 % (ref 12.3–15.4)
ETEC LTA+ST1A+ST1B TOX ST NAA+NON-PROBE: NOT DETECTED
FERRITIN SERPL-MCNC: 630 NG/ML (ref 13–150)
FLUAV SUBTYP SPEC NAA+PROBE: NOT DETECTED
FLUBV RNA ISLT QL NAA+PROBE: NOT DETECTED
G LAMBLIA DNA STL QL NAA+NON-PROBE: NOT DETECTED
GFR SERPL CREATININE-BSD FRML MDRD: 134 ML/MIN/1.73
GFR SERPL CREATININE-BSD FRML MDRD: 150 ML/MIN/1.73
GFR SERPL CREATININE-BSD FRML MDRD: >150 ML/MIN/1.73
GLOBULIN UR ELPH-MCNC: 2.5 GM/DL
GLOBULIN UR ELPH-MCNC: 2.6 GM/DL
GLUCOSE SERPL-MCNC: 112 MG/DL (ref 65–99)
GLUCOSE SERPL-MCNC: 125 MG/DL (ref 65–99)
GLUCOSE SERPL-MCNC: 145 MG/DL (ref 65–99)
GLUCOSE UR STRIP-MCNC: NEGATIVE MG/DL
HADV DNA SPEC NAA+PROBE: NOT DETECTED
HCOV 229E RNA SPEC QL NAA+PROBE: NOT DETECTED
HCOV HKU1 RNA SPEC QL NAA+PROBE: NOT DETECTED
HCOV NL63 RNA SPEC QL NAA+PROBE: NOT DETECTED
HCOV OC43 RNA SPEC QL NAA+PROBE: NOT DETECTED
HCT VFR BLD AUTO: 32.4 % (ref 34–46.6)
HCT VFR BLD AUTO: 34.2 % (ref 34–46.6)
HEMOCCULT STL QL: POSITIVE
HGB BLD-MCNC: 11.3 G/DL (ref 12–15.9)
HGB BLD-MCNC: 11.8 G/DL (ref 12–15.9)
HGB UR QL STRIP.AUTO: NEGATIVE
HMPV RNA NPH QL NAA+NON-PROBE: NOT DETECTED
HPIV1 RNA ISLT QL NAA+PROBE: NOT DETECTED
HPIV2 RNA SPEC QL NAA+PROBE: NOT DETECTED
HPIV3 RNA NPH QL NAA+PROBE: NOT DETECTED
HPIV4 P GENE NPH QL NAA+PROBE: NOT DETECTED
HYALINE CASTS UR QL AUTO: NORMAL /LPF
IMM GRANULOCYTES # BLD AUTO: 0.23 10*3/MM3 (ref 0–0.05)
IMM GRANULOCYTES NFR BLD AUTO: 0.9 % (ref 0–0.5)
KETONES UR QL STRIP: NEGATIVE
LEUKOCYTE ESTERASE UR QL STRIP.AUTO: NEGATIVE
LIPASE SERPL-CCNC: 45 U/L (ref 13–60)
LYMPHOCYTES # BLD AUTO: 0.33 10*3/MM3 (ref 0.7–3.1)
LYMPHOCYTES NFR BLD AUTO: 1.3 % (ref 19.6–45.3)
M PNEUMO IGG SER IA-ACNC: NOT DETECTED
MAGNESIUM SERPL-MCNC: 1.3 MG/DL (ref 1.7–2.3)
MAGNESIUM SERPL-MCNC: 2.3 MG/DL (ref 1.7–2.3)
MCH RBC QN AUTO: 33.1 PG (ref 26.6–33)
MCH RBC QN AUTO: 33.1 PG (ref 26.6–33)
MCHC RBC AUTO-ENTMCNC: 34.5 G/DL (ref 31.5–35.7)
MCHC RBC AUTO-ENTMCNC: 34.9 G/DL (ref 31.5–35.7)
MCV RBC AUTO: 95 FL (ref 79–97)
MCV RBC AUTO: 95.8 FL (ref 79–97)
MONOCYTES # BLD AUTO: 1.01 10*3/MM3 (ref 0.1–0.9)
MONOCYTES NFR BLD AUTO: 4 % (ref 5–12)
NEUTROPHILS NFR BLD AUTO: 23.88 10*3/MM3 (ref 1.7–7)
NEUTROPHILS NFR BLD AUTO: 93.6 % (ref 42.7–76)
NITRITE UR QL STRIP: NEGATIVE
NOROVIRUS GI+II RNA STL QL NAA+NON-PROBE: NOT DETECTED
NRBC BLD AUTO-RTO: 0 /100 WBC (ref 0–0.2)
OSMOLALITY UR: 312 MOSM/KG
P SHIGELLOIDES DNA STL QL NAA+NON-PROBE: NOT DETECTED
PH UR STRIP.AUTO: 6.5 [PH] (ref 5–8)
PLATELET # BLD AUTO: 362 10*3/MM3 (ref 140–450)
PLATELET # BLD AUTO: 420 10*3/MM3 (ref 140–450)
PMV BLD AUTO: 8.7 FL (ref 6–12)
PMV BLD AUTO: 8.9 FL (ref 6–12)
POTASSIUM SERPL-SCNC: 3.4 MMOL/L (ref 3.5–5.2)
POTASSIUM SERPL-SCNC: 3.8 MMOL/L (ref 3.5–5.2)
POTASSIUM SERPL-SCNC: 4.9 MMOL/L (ref 3.5–5.2)
PROCALCITONIN SERPL-MCNC: 0.05 NG/ML (ref 0–0.25)
PROCALCITONIN SERPL-MCNC: 0.07 NG/ML (ref 0–0.25)
PROT SERPL-MCNC: 5.8 G/DL (ref 6–8.5)
PROT SERPL-MCNC: 6.3 G/DL (ref 6–8.5)
PROT UR QL STRIP: ABNORMAL
RBC # BLD AUTO: 3.41 10*6/MM3 (ref 3.77–5.28)
RBC # BLD AUTO: 3.57 10*6/MM3 (ref 3.77–5.28)
RBC # UR STRIP: NORMAL /HPF
REF LAB TEST METHOD: NORMAL
RHINOVIRUS RNA SPEC NAA+PROBE: NOT DETECTED
RSV RNA NPH QL NAA+NON-PROBE: NOT DETECTED
RVA RNA STL QL NAA+NON-PROBE: NOT DETECTED
S ENT+BONG DNA STL QL NAA+NON-PROBE: NOT DETECTED
SAPO I+II+IV+V RNA STL QL NAA+NON-PROBE: NOT DETECTED
SARS-COV-2 ORF1AB RESP QL NAA+PROBE: DETECTED
SARS-COV-2 RNA NPH QL NAA+NON-PROBE: DETECTED
SARS-COV-2 RNA RESP QL NAA+PROBE: DETECTED
SHIGELLA SP+EIEC IPAH ST NAA+NON-PROBE: NOT DETECTED
SODIUM SERPL-SCNC: 130 MMOL/L (ref 136–145)
SODIUM SERPL-SCNC: 135 MMOL/L (ref 136–145)
SODIUM SERPL-SCNC: 135 MMOL/L (ref 136–145)
SODIUM UR-SCNC: 27 MMOL/L
SP GR UR STRIP: 1.01 (ref 1–1.03)
SQUAMOUS #/AREA URNS HPF: NORMAL /HPF
TROPONIN T SERPL-MCNC: <0.01 NG/ML (ref 0–0.03)
UROBILINOGEN UR QL STRIP: ABNORMAL
V CHOL+PARA+VUL DNA STL QL NAA+NON-PROBE: NOT DETECTED
V CHOLERAE DNA STL QL NAA+NON-PROBE: NOT DETECTED
WBC # UR STRIP: NORMAL /HPF
WBC NRBC COR # BLD: 24.87 10*3/MM3 (ref 3.4–10.8)
WBC NRBC COR # BLD: 25.49 10*3/MM3 (ref 3.4–10.8)
Y ENTEROCOL DNA STL QL NAA+NON-PROBE: NOT DETECTED

## 2022-01-08 PROCEDURE — 0097U HC BIOFIRE FILMARRAY GI PANEL: CPT | Performed by: INTERNAL MEDICINE

## 2022-01-08 PROCEDURE — 87493 C DIFF AMPLIFIED PROBE: CPT | Performed by: INTERNAL MEDICINE

## 2022-01-08 PROCEDURE — 74177 CT ABD & PELVIS W/CONTRAST: CPT

## 2022-01-08 PROCEDURE — 84145 PROCALCITONIN (PCT): CPT | Performed by: PHYSICIAN ASSISTANT

## 2022-01-08 PROCEDURE — 87150 DNA/RNA AMPLIFIED PROBE: CPT | Performed by: PHYSICIAN ASSISTANT

## 2022-01-08 PROCEDURE — 83605 ASSAY OF LACTIC ACID: CPT | Performed by: PHYSICIAN ASSISTANT

## 2022-01-08 PROCEDURE — 87040 BLOOD CULTURE FOR BACTERIA: CPT | Performed by: INTERNAL MEDICINE

## 2022-01-08 PROCEDURE — 82728 ASSAY OF FERRITIN: CPT | Performed by: INTERNAL MEDICINE

## 2022-01-08 PROCEDURE — 85027 COMPLETE CBC AUTOMATED: CPT | Performed by: NURSE PRACTITIONER

## 2022-01-08 PROCEDURE — 87186 SC STD MICRODIL/AGAR DIL: CPT | Performed by: PHYSICIAN ASSISTANT

## 2022-01-08 PROCEDURE — 25010000002 AZITHROMYCIN PER 500 MG: Performed by: INTERNAL MEDICINE

## 2022-01-08 PROCEDURE — 83935 ASSAY OF URINE OSMOLALITY: CPT | Performed by: NURSE PRACTITIONER

## 2022-01-08 PROCEDURE — 94640 AIRWAY INHALATION TREATMENT: CPT

## 2022-01-08 PROCEDURE — 25010000002 CEFTRIAXONE PER 250 MG: Performed by: PHYSICIAN ASSISTANT

## 2022-01-08 PROCEDURE — U0004 COV-19 TEST NON-CDC HGH THRU: HCPCS | Performed by: PHYSICIAN ASSISTANT

## 2022-01-08 PROCEDURE — U0003 INFECTIOUS AGENT DETECTION BY NUCLEIC ACID (DNA OR RNA); SEVERE ACUTE RESPIRATORY SYNDROME CORONAVIRUS 2 (SARS-COV-2) (CORONAVIRUS DISEASE [COVID-19]), AMPLIFIED PROBE TECHNIQUE, MAKING USE OF HIGH THROUGHPUT TECHNOLOGIES AS DESCRIBED BY CMS-2020-01-R: HCPCS | Performed by: HOSPITALIST

## 2022-01-08 PROCEDURE — 25010000002 ENOXAPARIN PER 10 MG: Performed by: NURSE PRACTITIONER

## 2022-01-08 PROCEDURE — 71045 X-RAY EXAM CHEST 1 VIEW: CPT

## 2022-01-08 PROCEDURE — 84300 ASSAY OF URINE SODIUM: CPT | Performed by: NURSE PRACTITIONER

## 2022-01-08 PROCEDURE — 25010000002 IOPAMIDOL 61 % SOLUTION: Performed by: EMERGENCY MEDICINE

## 2022-01-08 PROCEDURE — 83690 ASSAY OF LIPASE: CPT | Performed by: INTERNAL MEDICINE

## 2022-01-08 PROCEDURE — 84145 PROCALCITONIN (PCT): CPT | Performed by: NURSE PRACTITIONER

## 2022-01-08 PROCEDURE — 85025 COMPLETE CBC W/AUTO DIFF WBC: CPT | Performed by: PHYSICIAN ASSISTANT

## 2022-01-08 PROCEDURE — 25010000002 MAGNESIUM SULFATE 2 GM/50ML SOLUTION: Performed by: NURSE PRACTITIONER

## 2022-01-08 PROCEDURE — 83735 ASSAY OF MAGNESIUM: CPT | Performed by: NURSE PRACTITIONER

## 2022-01-08 PROCEDURE — 94799 UNLISTED PULMONARY SVC/PX: CPT

## 2022-01-08 PROCEDURE — 36415 COLL VENOUS BLD VENIPUNCTURE: CPT | Performed by: NURSE PRACTITIONER

## 2022-01-08 PROCEDURE — 82436 ASSAY OF URINE CHLORIDE: CPT | Performed by: NURSE PRACTITIONER

## 2022-01-08 PROCEDURE — 87040 BLOOD CULTURE FOR BACTERIA: CPT | Performed by: PHYSICIAN ASSISTANT

## 2022-01-08 PROCEDURE — 86140 C-REACTIVE PROTEIN: CPT | Performed by: INTERNAL MEDICINE

## 2022-01-08 PROCEDURE — 87147 CULTURE TYPE IMMUNOLOGIC: CPT | Performed by: PHYSICIAN ASSISTANT

## 2022-01-08 PROCEDURE — 81001 URINALYSIS AUTO W/SCOPE: CPT | Performed by: PHYSICIAN ASSISTANT

## 2022-01-08 PROCEDURE — 84484 ASSAY OF TROPONIN QUANT: CPT | Performed by: PHYSICIAN ASSISTANT

## 2022-01-08 PROCEDURE — 25010000002 MAGNESIUM SULFATE 2 GM/50ML SOLUTION: Performed by: PHYSICIAN ASSISTANT

## 2022-01-08 PROCEDURE — 94664 DEMO&/EVAL PT USE INHALER: CPT

## 2022-01-08 PROCEDURE — 80053 COMPREHEN METABOLIC PANEL: CPT | Performed by: PHYSICIAN ASSISTANT

## 2022-01-08 PROCEDURE — 82272 OCCULT BLD FECES 1-3 TESTS: CPT | Performed by: INTERNAL MEDICINE

## 2022-01-08 PROCEDURE — 83735 ASSAY OF MAGNESIUM: CPT | Performed by: PHYSICIAN ASSISTANT

## 2022-01-08 PROCEDURE — 0202U NFCT DS 22 TRGT SARS-COV-2: CPT | Performed by: INTERNAL MEDICINE

## 2022-01-08 RX ORDER — POTASSIUM CHLORIDE 750 MG/1
40 TABLET, FILM COATED, EXTENDED RELEASE ORAL AS NEEDED
Status: DISCONTINUED | OUTPATIENT
Start: 2022-01-08 | End: 2022-01-13 | Stop reason: HOSPADM

## 2022-01-08 RX ORDER — ATORVASTATIN CALCIUM 20 MG/1
40 TABLET, FILM COATED ORAL NIGHTLY
Status: DISCONTINUED | OUTPATIENT
Start: 2022-01-08 | End: 2022-01-13 | Stop reason: HOSPADM

## 2022-01-08 RX ORDER — MAGNESIUM SULFATE HEPTAHYDRATE 40 MG/ML
2 INJECTION, SOLUTION INTRAVENOUS AS NEEDED
Status: DISCONTINUED | OUTPATIENT
Start: 2022-01-08 | End: 2022-01-13 | Stop reason: HOSPADM

## 2022-01-08 RX ORDER — SODIUM CHLORIDE 0.9 % (FLUSH) 0.9 %
10 SYRINGE (ML) INJECTION EVERY 12 HOURS SCHEDULED
Status: DISCONTINUED | OUTPATIENT
Start: 2022-01-08 | End: 2022-01-13 | Stop reason: HOSPADM

## 2022-01-08 RX ORDER — MAGNESIUM SULFATE HEPTAHYDRATE 40 MG/ML
4 INJECTION, SOLUTION INTRAVENOUS AS NEEDED
Status: DISCONTINUED | OUTPATIENT
Start: 2022-01-08 | End: 2022-01-13 | Stop reason: HOSPADM

## 2022-01-08 RX ORDER — ACETAMINOPHEN 325 MG/1
650 TABLET ORAL EVERY 4 HOURS PRN
Status: DISCONTINUED | OUTPATIENT
Start: 2022-01-08 | End: 2022-01-13 | Stop reason: HOSPADM

## 2022-01-08 RX ORDER — ALBUTEROL SULFATE 90 UG/1
2 AEROSOL, METERED RESPIRATORY (INHALATION) EVERY 4 HOURS PRN
Status: DISCONTINUED | OUTPATIENT
Start: 2022-01-08 | End: 2022-01-13 | Stop reason: HOSPADM

## 2022-01-08 RX ORDER — FOLIC ACID 1 MG/1
1 TABLET ORAL DAILY
Status: DISCONTINUED | OUTPATIENT
Start: 2022-01-08 | End: 2022-01-13 | Stop reason: HOSPADM

## 2022-01-08 RX ORDER — POTASSIUM CHLORIDE 7.45 MG/ML
10 INJECTION INTRAVENOUS
Status: DISCONTINUED | OUTPATIENT
Start: 2022-01-08 | End: 2022-01-13 | Stop reason: HOSPADM

## 2022-01-08 RX ORDER — PANTOPRAZOLE SODIUM 40 MG/10ML
40 INJECTION, POWDER, LYOPHILIZED, FOR SOLUTION INTRAVENOUS
Status: DISCONTINUED | OUTPATIENT
Start: 2022-01-09 | End: 2022-01-10

## 2022-01-08 RX ORDER — NITROGLYCERIN 0.4 MG/1
0.4 TABLET SUBLINGUAL
Status: DISCONTINUED | OUTPATIENT
Start: 2022-01-08 | End: 2022-01-13 | Stop reason: HOSPADM

## 2022-01-08 RX ORDER — ASPIRIN 81 MG/1
81 TABLET ORAL DAILY
Status: DISCONTINUED | OUTPATIENT
Start: 2022-01-08 | End: 2022-01-13 | Stop reason: HOSPADM

## 2022-01-08 RX ORDER — BUDESONIDE AND FORMOTEROL FUMARATE DIHYDRATE 160; 4.5 UG/1; UG/1
2 AEROSOL RESPIRATORY (INHALATION)
Status: DISCONTINUED | OUTPATIENT
Start: 2022-01-08 | End: 2022-01-13 | Stop reason: HOSPADM

## 2022-01-08 RX ORDER — TRAZODONE HYDROCHLORIDE 50 MG/1
50 TABLET ORAL NIGHTLY
Status: DISCONTINUED | OUTPATIENT
Start: 2022-01-08 | End: 2022-01-13 | Stop reason: HOSPADM

## 2022-01-08 RX ORDER — GUAIFENESIN 600 MG/1
600 TABLET, EXTENDED RELEASE ORAL EVERY 12 HOURS SCHEDULED
Status: DISCONTINUED | OUTPATIENT
Start: 2022-01-08 | End: 2022-01-13 | Stop reason: HOSPADM

## 2022-01-08 RX ORDER — ACETAMINOPHEN 650 MG/1
650 SUPPOSITORY RECTAL EVERY 4 HOURS PRN
Status: DISCONTINUED | OUTPATIENT
Start: 2022-01-08 | End: 2022-01-13 | Stop reason: HOSPADM

## 2022-01-08 RX ORDER — POTASSIUM CHLORIDE 1.5 G/1.77G
40 POWDER, FOR SOLUTION ORAL AS NEEDED
Status: DISCONTINUED | OUTPATIENT
Start: 2022-01-08 | End: 2022-01-13 | Stop reason: HOSPADM

## 2022-01-08 RX ORDER — SODIUM CHLORIDE 9 MG/ML
75 INJECTION, SOLUTION INTRAVENOUS CONTINUOUS
Status: DISCONTINUED | OUTPATIENT
Start: 2022-01-08 | End: 2022-01-09

## 2022-01-08 RX ORDER — ONDANSETRON 4 MG/1
4 TABLET, FILM COATED ORAL EVERY 6 HOURS PRN
Status: DISCONTINUED | OUTPATIENT
Start: 2022-01-08 | End: 2022-01-13 | Stop reason: HOSPADM

## 2022-01-08 RX ORDER — DULOXETIN HYDROCHLORIDE 30 MG/1
30 CAPSULE, DELAYED RELEASE ORAL DAILY
Status: DISCONTINUED | OUTPATIENT
Start: 2022-01-08 | End: 2022-01-13 | Stop reason: HOSPADM

## 2022-01-08 RX ORDER — CARVEDILOL 25 MG/1
25 TABLET ORAL 2 TIMES DAILY WITH MEALS
Status: DISCONTINUED | OUTPATIENT
Start: 2022-01-08 | End: 2022-01-13 | Stop reason: HOSPADM

## 2022-01-08 RX ORDER — ACETAMINOPHEN 160 MG/5ML
650 SOLUTION ORAL EVERY 4 HOURS PRN
Status: DISCONTINUED | OUTPATIENT
Start: 2022-01-08 | End: 2022-01-13 | Stop reason: HOSPADM

## 2022-01-08 RX ORDER — TRAMADOL HYDROCHLORIDE 50 MG/1
50 TABLET ORAL EVERY 8 HOURS PRN
Status: DISCONTINUED | OUTPATIENT
Start: 2022-01-08 | End: 2022-01-13 | Stop reason: HOSPADM

## 2022-01-08 RX ORDER — ONDANSETRON 2 MG/ML
4 INJECTION INTRAMUSCULAR; INTRAVENOUS EVERY 6 HOURS PRN
Status: DISCONTINUED | OUTPATIENT
Start: 2022-01-08 | End: 2022-01-13 | Stop reason: HOSPADM

## 2022-01-08 RX ORDER — SODIUM CHLORIDE 0.9 % (FLUSH) 0.9 %
10 SYRINGE (ML) INJECTION AS NEEDED
Status: DISCONTINUED | OUTPATIENT
Start: 2022-01-08 | End: 2022-01-13 | Stop reason: HOSPADM

## 2022-01-08 RX ORDER — CALCIUM CARBONATE 200(500)MG
2 TABLET,CHEWABLE ORAL 2 TIMES DAILY PRN
Status: DISCONTINUED | OUTPATIENT
Start: 2022-01-08 | End: 2022-01-13 | Stop reason: HOSPADM

## 2022-01-08 RX ORDER — DULOXETIN HYDROCHLORIDE 20 MG/1
30 CAPSULE, DELAYED RELEASE ORAL DAILY
COMMUNITY

## 2022-01-08 RX ORDER — DEXAMETHASONE 6 MG/1
6 TABLET ORAL
Status: DISCONTINUED | OUTPATIENT
Start: 2022-01-08 | End: 2022-01-10

## 2022-01-08 RX ORDER — AMLODIPINE BESYLATE 5 MG/1
5 TABLET ORAL DAILY
Status: DISCONTINUED | OUTPATIENT
Start: 2022-01-08 | End: 2022-01-13 | Stop reason: HOSPADM

## 2022-01-08 RX ORDER — IPRATROPIUM BROMIDE AND ALBUTEROL SULFATE 2.5; .5 MG/3ML; MG/3ML
3 SOLUTION RESPIRATORY (INHALATION) EVERY 4 HOURS PRN
Status: DISCONTINUED | OUTPATIENT
Start: 2022-01-08 | End: 2022-01-08 | Stop reason: SDUPTHER

## 2022-01-08 RX ORDER — HYDROCORTISONE ACETATE 25 MG/1
25 SUPPOSITORY RECTAL 2 TIMES DAILY
Status: DISCONTINUED | OUTPATIENT
Start: 2022-01-08 | End: 2022-01-13 | Stop reason: HOSPADM

## 2022-01-08 RX ORDER — LATANOPROST 50 UG/ML
1 SOLUTION/ DROPS OPHTHALMIC NIGHTLY
Status: DISCONTINUED | OUTPATIENT
Start: 2022-01-08 | End: 2022-01-13 | Stop reason: HOSPADM

## 2022-01-08 RX ORDER — GLIMEPIRIDE 2 MG/1
1 TABLET ORAL DAILY
Status: DISCONTINUED | OUTPATIENT
Start: 2022-01-08 | End: 2022-01-13 | Stop reason: HOSPADM

## 2022-01-08 RX ORDER — MAGNESIUM SULFATE HEPTAHYDRATE 40 MG/ML
2 INJECTION, SOLUTION INTRAVENOUS ONCE
Status: COMPLETED | OUTPATIENT
Start: 2022-01-08 | End: 2022-01-08

## 2022-01-08 RX ADMIN — HYDROCORTISONE ACETATE 25 MG: 25 SUPPOSITORY RECTAL at 21:12

## 2022-01-08 RX ADMIN — MAGNESIUM SULFATE HEPTAHYDRATE 2 G: 2 INJECTION, SOLUTION INTRAVENOUS at 08:47

## 2022-01-08 RX ADMIN — MAGNESIUM SULFATE HEPTAHYDRATE 2 G: 2 INJECTION, SOLUTION INTRAVENOUS at 03:14

## 2022-01-08 RX ADMIN — SODIUM CHLORIDE 500 ML: 9 INJECTION, SOLUTION INTRAVENOUS at 00:54

## 2022-01-08 RX ADMIN — MAGNESIUM SULFATE HEPTAHYDRATE 2 G: 2 INJECTION, SOLUTION INTRAVENOUS at 13:03

## 2022-01-08 RX ADMIN — GUAIFENESIN 600 MG: 600 TABLET, EXTENDED RELEASE ORAL at 13:01

## 2022-01-08 RX ADMIN — IOPAMIDOL 100 ML: 612 INJECTION, SOLUTION INTRAVENOUS at 02:41

## 2022-01-08 RX ADMIN — TIMOLOL MALEATE 1 DROP: 2.5 SOLUTION OPHTHALMIC at 15:19

## 2022-01-08 RX ADMIN — AMLODIPINE BESYLATE 5 MG: 5 TABLET ORAL at 13:01

## 2022-01-08 RX ADMIN — BUDESONIDE AND FORMOTEROL FUMARATE DIHYDRATE 2 PUFF: 160; 4.5 AEROSOL RESPIRATORY (INHALATION) at 20:55

## 2022-01-08 RX ADMIN — CARVEDILOL 25 MG: 25 TABLET, FILM COATED ORAL at 17:24

## 2022-01-08 RX ADMIN — AZITHROMYCIN MONOHYDRATE 500 MG: 500 INJECTION, POWDER, LYOPHILIZED, FOR SOLUTION INTRAVENOUS at 15:18

## 2022-01-08 RX ADMIN — DEXAMETHASONE 6 MG: 6 TABLET ORAL at 08:47

## 2022-01-08 RX ADMIN — CEFTRIAXONE 1 G: 1 INJECTION, POWDER, FOR SOLUTION INTRAMUSCULAR; INTRAVENOUS at 05:04

## 2022-01-08 RX ADMIN — TRAZODONE HYDROCHLORIDE 50 MG: 50 TABLET ORAL at 21:20

## 2022-01-08 RX ADMIN — DULOXETINE HYDROCHLORIDE 30 MG: 30 CAPSULE, DELAYED RELEASE ORAL at 13:01

## 2022-01-08 RX ADMIN — POTASSIUM CHLORIDE 40 MEQ: 750 TABLET, EXTENDED RELEASE ORAL at 13:01

## 2022-01-08 RX ADMIN — FOLIC ACID 1 MG: 1 TABLET ORAL at 13:01

## 2022-01-08 RX ADMIN — SODIUM CHLORIDE, PRESERVATIVE FREE 10 ML: 5 INJECTION INTRAVENOUS at 21:12

## 2022-01-08 RX ADMIN — LATANOPROST 1 DROP: 50 SOLUTION OPHTHALMIC at 21:12

## 2022-01-08 RX ADMIN — POTASSIUM CHLORIDE 40 MEQ: 750 TABLET, EXTENDED RELEASE ORAL at 08:47

## 2022-01-08 RX ADMIN — SODIUM CHLORIDE 75 ML/HR: 9 INJECTION, SOLUTION INTRAVENOUS at 05:04

## 2022-01-08 RX ADMIN — ENOXAPARIN SODIUM 30 MG: 30 INJECTION SUBCUTANEOUS at 08:46

## 2022-01-08 RX ADMIN — ATORVASTATIN CALCIUM 40 MG: 20 TABLET, FILM COATED ORAL at 21:12

## 2022-01-08 RX ADMIN — ASPIRIN 81 MG: 81 TABLET, COATED ORAL at 13:01

## 2022-01-08 RX ADMIN — SODIUM CHLORIDE, PRESERVATIVE FREE 10 ML: 5 INJECTION INTRAVENOUS at 08:47

## 2022-01-08 NOTE — ED PROVIDER NOTES
" EMERGENCY DEPARTMENT ENCOUNTER    Room Number:  06/06  Date of encounter:  1/8/2022  PCP: Hernan Cruz MD  Historian: Patient      HPI:  Chief Complaint: Abdominal Pain       Context: Bo Adan is a 89 y.o. female with past medical history of HTN, HLD, and GERD who presents to the ED for further evaluation after instructed to come here by her PCP for an elevated WBC count.  Patient's son states that they were told that her white count was 30 and they were instructed to go to the ER.  When asked how the patient fell patient states \"I do not feel good\".  Patient complains of some abdominal pain with black and tarry stools, slight cough, sore throat, and abdominal pain.  Denies any shortness of breath, chest pain, fever, headache, or UTI symptoms.  Patient was diagnosed with Covid on 12/28/2021 even though she is fully vaccinated.  Patient states that she was recently started on an unknown antibiotic and steroids.      PAST MEDICAL HISTORY  Active Ambulatory Problems     Diagnosis Date Noted   • Anemia 03/04/2021   • Diarrhea of presumed infectious origin 05/12/2021   • Essential hypertension 05/12/2021   • GERD without esophagitis 05/12/2021   • History of cervical cancer 05/12/2021   • History of pancreatitis 05/12/2021   • Elevated cholesterol 05/12/2021   • Chest pain 05/12/2021   • Diarrhea 05/13/2021   • Enteritis 05/13/2021   • Hypokalemia 05/13/2021   • Hypomagnesemia 05/13/2021     Resolved Ambulatory Problems     Diagnosis Date Noted   • No Resolved Ambulatory Problems     Past Medical History:   Diagnosis Date   • Arthritis    • Cancer (HCC)    • GERD (gastroesophageal reflux disease)    • Hypertension          PAST SURGICAL HISTORY  Past Surgical History:   Procedure Laterality Date   • CHOLECYSTECTOMY     • COLONOSCOPY     • ENDOSCOPY     • FRACTURE SURGERY      feb 23 2021         FAMILY HISTORY  No family history on file.      SOCIAL HISTORY  Social History     Socioeconomic History   • " Marital status:    Tobacco Use   • Smoking status: Never Smoker   • Smokeless tobacco: Never Used   Substance and Sexual Activity   • Alcohol use: Not Currently   • Drug use: Not Currently   • Sexual activity: Defer         ALLERGIES  Bactrim [sulfamethoxazole-trimethoprim], Doxycycline, Fosamax [alendronate], Lactose intolerance (gi), Macrodantin [nitrofurantoin], Naproxen, and Zestril [lisinopril]        REVIEW OF SYSTEMS  Review of Systems     All systems reviewed and negative except for those discussed in HPI.       PHYSICAL EXAM    I have reviewed the triage vital signs and nursing notes.    ED Triage Vitals [01/07/22 3548]   Temp Heart Rate Resp BP SpO2   97.3 °F (36.3 °C) 86 16 142/93 94 %      Temp src Heart Rate Source Patient Position BP Location FiO2 (%)   Temporal Monitor Standing Right arm --       Physical Exam  GENERAL: Alert and oriented x3, thin/frail, not distressed  HENT: moist mucous membranes  EYES: no scleral icterus  CV: regular rhythm, regular rate  RESPIRATORY: normal effort  ABDOMEN: soft/nontender, no rebound or guarding  RECTAL: No external hemorrhoids, Hemoccult faint/trace positive  MUSCULOSKELETAL: no deformity  NEURO: alert, moves all extremities, follows commands  SKIN: warm, dry        LAB RESULTS  Recent Results (from the past 24 hour(s))   Comprehensive Metabolic Panel    Collection Time: 01/08/22 12:37 AM    Specimen: Blood   Result Value Ref Range    Glucose 125 (H) 65 - 99 mg/dL    BUN 13 8 - 23 mg/dL    Creatinine 0.40 (L) 0.57 - 1.00 mg/dL    Sodium 130 (L) 136 - 145 mmol/L    Potassium 3.4 (L) 3.5 - 5.2 mmol/L    Chloride 91 (L) 98 - 107 mmol/L    CO2 29.4 (H) 22.0 - 29.0 mmol/L    Calcium 8.8 8.2 - 9.6 mg/dL    Total Protein 6.3 6.0 - 8.5 g/dL    Albumin 3.70 3.50 - 5.20 g/dL    ALT (SGPT) 16 1 - 33 U/L    AST (SGOT) 22 1 - 32 U/L    Alkaline Phosphatase 73 39 - 117 U/L    Total Bilirubin 0.3 0.0 - 1.2 mg/dL    eGFR Non African Amer 150 >60 mL/min/1.73    Globulin  2.6 gm/dL    A/G Ratio 1.4 g/dL    BUN/Creatinine Ratio 32.5 (H) 7.0 - 25.0    Anion Gap 9.6 5.0 - 15.0 mmol/L   Troponin    Collection Time: 01/08/22 12:37 AM    Specimen: Blood   Result Value Ref Range    Troponin T <0.010 0.000 - 0.030 ng/mL   Lactic Acid, Plasma    Collection Time: 01/08/22 12:37 AM    Specimen: Blood   Result Value Ref Range    Lactate 1.1 0.5 - 2.0 mmol/L   Procalcitonin    Collection Time: 01/08/22 12:37 AM    Specimen: Blood   Result Value Ref Range    Procalcitonin 0.07 0.00 - 0.25 ng/mL   Magnesium    Collection Time: 01/08/22 12:37 AM    Specimen: Blood   Result Value Ref Range    Magnesium 1.3 (L) 1.7 - 2.3 mg/dL   CBC Auto Differential    Collection Time: 01/08/22 12:37 AM    Specimen: Blood   Result Value Ref Range    WBC 25.49 (H) 3.40 - 10.80 10*3/mm3    RBC 3.57 (L) 3.77 - 5.28 10*6/mm3    Hemoglobin 11.8 (L) 12.0 - 15.9 g/dL    Hematocrit 34.2 34.0 - 46.6 %    MCV 95.8 79.0 - 97.0 fL    MCH 33.1 (H) 26.6 - 33.0 pg    MCHC 34.5 31.5 - 35.7 g/dL    RDW 13.1 12.3 - 15.4 %    RDW-SD 45.7 37.0 - 54.0 fl    MPV 8.7 6.0 - 12.0 fL    Platelets 362 140 - 450 10*3/mm3    Neutrophil % 93.6 (H) 42.7 - 76.0 %    Lymphocyte % 1.3 (L) 19.6 - 45.3 %    Monocyte % 4.0 (L) 5.0 - 12.0 %    Eosinophil % 0.0 (L) 0.3 - 6.2 %    Basophil % 0.2 0.0 - 1.5 %    Immature Grans % 0.9 (H) 0.0 - 0.5 %    Neutrophils, Absolute 23.88 (H) 1.70 - 7.00 10*3/mm3    Lymphocytes, Absolute 0.33 (L) 0.70 - 3.10 10*3/mm3    Monocytes, Absolute 1.01 (H) 0.10 - 0.90 10*3/mm3    Eosinophils, Absolute 0.00 0.00 - 0.40 10*3/mm3    Basophils, Absolute 0.04 0.00 - 0.20 10*3/mm3    Immature Grans, Absolute 0.23 (H) 0.00 - 0.05 10*3/mm3    nRBC 0.0 0.0 - 0.2 /100 WBC   Urinalysis With Microscopic If Indicated (No Culture) - Urine, Clean Catch    Collection Time: 01/08/22  1:14 AM    Specimen: Urine, Clean Catch   Result Value Ref Range    Color, UA Yellow Yellow, Straw    Appearance, UA Clear Clear    pH, UA 6.5 5.0 - 8.0     Specific Gravity, UA 1.014 1.005 - 1.030    Glucose, UA Negative Negative    Ketones, UA Negative Negative    Bilirubin, UA Negative Negative    Blood, UA Negative Negative    Protein,  mg/dL (2+) (A) Negative    Leuk Esterase, UA Negative Negative    Nitrite, UA Negative Negative    Urobilinogen, UA 1.0 E.U./dL 0.2 - 1.0 E.U./dL   Urinalysis, Microscopic Only - Urine, Clean Catch    Collection Time: 01/08/22  1:14 AM    Specimen: Urine, Clean Catch   Result Value Ref Range    RBC, UA 0-2 None Seen, 0-2 /HPF    WBC, UA 0-2 None Seen, 0-2 /HPF    Bacteria, UA None Seen None Seen /HPF    Squamous Epithelial Cells, UA 0-2 None Seen, 0-2 /HPF    Hyaline Casts, UA 0-2 None Seen /LPF    Methodology Automated Microscopy        Ordered the above labs and independently reviewed the results.        RADIOLOGY  CT Abdomen Pelvis With Contrast    Result Date: 1/8/2022  CT OF THE ABDOMEN AND PELVIS WITH CONTRAST  HISTORY: Abdominal pain, leukocytosis, and COVID.  COMPARISON: 08/20/2021  TECHNIQUE: Axial CT imaging was obtained through the abdomen and pelvis. IV contrast was administered.  FINDINGS: Images through the lung bases demonstrate areas of scarring. More focal consolidation is noted within the right lower lobe. This area measures up to 3.4 x 1.5 cm. It may represent pneumonia, but short-term CT follow-up in 3 months is recommended to document resolution. The stomach and duodenum appear unremarkable, as are the adrenal glands. Pancreas is within normal limits for a 89-year-old patient. Calcified granulomata are seen within the spleen. There is persistent intra and extrahepatic biliary dilatation. Common bile duct measures up to 9 mm. This is stable when compared to prior study. Patient did have pneumobilia the prior exam, which is no longer seen. The degree of intrahepatic biliary dilatation appears stable as well. Gallbladder is surgically absent. The kidneys enhance symmetrically. Areas of cortical thinning are  noted on the left kidney, suggesting the sequela prior insults. There are bilateral renal cysts. There is calcification of the aorta. Urinary bladder is distended, and correlation with any symptoms of urinary retention is recommended. Trace amount of free fluid is noted within the pelvis. There is no evidence of bowel obstruction. There is no evidence of appendicitis. There is lumbar scoliosis, with convexity to the left. The patient is osteoporotic. Compression deformity of L5 is stable, as is an old sacral fracture. 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       1. There is an area of airspace consolidation within the right lower lobe. This could reflect pneumonia, but short-term CT follow-up to document resolution is recommended. 2. Stable intra and extrahepatic biliary dilatation, although pneumobilia is no longer seen. 3. Marked distention of the urinary bladder. Correlation with any evidence of urinary retention is recommended.  Radiation dose reduction techniques were utilized, including automated exposure control and exposure modulation based on body size.  This report was finalized on 1/8/2022 3:07 AM by Dr. Marixa Pat M.D.      XR Chest 1 View    Result Date: 1/8/2022  SINGLE VIEW OF THE CHEST  HISTORY: Cough. COVID diagnosis.  COMPARISON: None available.  FINDINGS: Cardiomegaly is present. There is no vascular congestion. There is some consolidation noted within the right midlung. This may reflect an area of chronic scarring with associated bronchiectasis, although infiltrate is not excluded. No pneumothorax is seen. No pleural effusion is identified. No definite infiltrates are seen on the left.      Bandlike area of consolidation noted within the right midlung. Appearance is more suggestive of parenchymal scarring, although infiltrate is not completely excluded. Short-term follow-up exam is recommended.  This report was finalized on 1/8/2022 12:56 AM by Dr. Marixa Pat  QUITA GREEN ordered the above noted radiological studies. Reviewed by me and discussed with radiologist.  See dictation for official radiology interpretation.      PROCEDURES    Procedures      MEDICATIONS GIVEN IN ER    Medications   sodium chloride 0.9 % flush 10 mL (has no administration in time range)   cefTRIAXone (ROCEPHIN) 1 g in sodium chloride 0.9 % 100 mL IVPB-VTB (has no administration in time range)   ipratropium-albuterol (DUO-NEB) nebulizer solution 3 mL (has no administration in time range)   cefTRIAXone (ROCEPHIN) 1 g in sodium chloride 0.9 % 100 mL IVPB-VTB (has no administration in time range)   sodium chloride 0.9 % bolus 500 mL (0 mL Intravenous Stopped 1/8/22 0124)   iopamidol (ISOVUE-300) 61 % injection 100 mL (100 mL Intravenous Given 1/8/22 0241)   magnesium sulfate 2g/50 mL (PREMIX) infusion (2 g Intravenous New Bag 1/8/22 0314)         PROGRESS, DATA ANALYSIS, CONSULTS, AND MEDICAL DECISION MAKING    All labs have been independently reviewed by me.  All radiology studies have been reviewed by me and discussed with radiologist dictating the report.   EKG's independently viewed and interpreted by me.  Discussion below represents my analysis of pertinent findings related to patient's condition, differential diagnosis, treatment plan and final disposition.    DDx: Includes but not limited to sepsis, UTI, pneumonia, adverse medication reaction, diverticulitis, appendicitis,    ED Course as of 01/08/22 0403   Sat Jan 08, 2022   0024 Lactate: 1.1 [BRYCE]   0056 WBC(!): 25.49 [BRYCE]   0056 Hemoglobin(!): 11.8 [BRYCE]   0056 Hematocrit: 34.2 [BRYCE]   0056 Platelets: 362 [BRYCE]   0142 Procalcitonin: 0.07 [BRYCE]   0142 Glucose(!): 125 [BRYCE]   0142 BUN: 13 [BRYCE]   0142 Creatinine(!): 0.40 [BRYCE]   0142 Sodium(!): 130 [BRYCE]   0142 Potassium(!): 3.4 [BRYCE]   0142 Chloride(!): 91 [BRYCE]   0142 CO2(!): 29.4 [BRYCE]   0142 Magnesium(!): 1.3 [BRYCE]   0142 Troponin T: <0.010 [BRYCE]   0336 Patient rechecked, discussed results, and  recommendation for admission for IV antibiotics and further evaluation.  Patient and son expressed understanding and agree with plan. [BRYCE]   0350 Patient care discussed with Abby HENSON, will admit to a telemetry bed per Dr. Majano. [BRYCE]      ED Course User Index  [BRYCE] Petr Rayo PA       MDM: Patient with 25,000 white count, recent Covid diagnosis, and pneumonia on CT of the abdomen pelvis will be admitted for IV antibiotics and monitoring.    PPE: The patient wore a surgical mask throughout the entire patient encounter. I wore an N95.    AS OF 04:03 EST VITALS:    BP - 155/87  HR - 96  TEMP - 97.3 °F (36.3 °C) (Temporal)  O2 SATS - 98%        DIAGNOSIS  Final diagnoses:   Pneumonia of right lung due to infectious organism, unspecified part of lung   Leukocytosis, unspecified type   Hypomagnesemia   COVID-19 virus infection   Abdominal pain, unspecified abdominal location   Hypertension, unspecified type   Hyperlipidemia, unspecified hyperlipidemia type         DISPOSITION  ADMISSION    Discussed treatment plan and reason for admission with pt/family and admitting physician.  Pt/family voiced understanding of the plan for admission for further testing/treatment as needed.                  Petr Rayo PA  01/08/22 [id number]

## 2022-01-08 NOTE — ED PROVIDER NOTES
I supervised care provided by the midlevel provider.   We have discussed this patient's history, physical exam, and treatment plan.  I have reviewed the note and personally saw and examined the patient and agree with the plan of care.   I talked over the case with Petr Danielle the physician assistant and I talked with the patient as well as the son in the room.  Patient had a recent diagnosis of COVID infection.  She has been vaccinated against COVID.  She went to see the doctor today because of abdominal pain.  It is diffuse abdominal pain but mainly in the left lower quadrant.  She had a history of episodic abdominal pain with problems with constipation.  She still has problems with constipation and has been using Pepto-Bismol and MiraLAX.  Her primary care doctor saw her today prescribed 2 antibiotics which we do not know what they are.  Son does not know.  White blood cell count returned at 30,000 and they called the patient and was instructed to come here to the emergency department.  She reports no vomiting.  She has had a cough that is persistent from COVID.  Denies any shortness of breath or chest pain.    GENERAL: Elderly female that is frail and chronically ill-appearing not distressed  HENT: nares patent  Head/neck/ face are symmetric without gross deformity or swelling  EYES: no scleral icterus  CV: regular rhythm, regular rate with intact distal pulses  RESPIRATORY: normal effort and no respiratory distress  ABDOMEN: soft and tenderness in the left mid and lower quadrant of her abdomen.  There is no guarding or rebound.  Positive bowel sounds.  MUSCULOSKELETAL: no deformity.  Diffuse muscle wasting.  Intact distal pulses to upper and lower extremities that are equal strong and symmetric.  NEURO: alert and appropriate, moves all extremities, follows commands.  No focal motor or sensory changes.  SKIN: warm, dry    Vital signs and nursing notes reviewed.    Plan informed patient as well as son of the test  that we will order.  All questions answered at this time.  Rectal exam was performed by Petr Danielle.  There was no melena it was tracely heme positive.  Black stools was probably from the Pepto-Bismol that she takes.      We are currently under a pandemic from the COVID19 infection.  The patient presented to the emergency department by ambulance or personal vehicle. I followed the current protocols required by In[facility] Control [facility] in my evaluation and treatment of the patient. The patient was wearing a face mask during my evaluation and throughout my encounter. During my whole encounter with this patient I used appropriate personal protective equipment.  This equipment consisted of eye protection, facemask, gown, and gloves.  I applied this equipment before entering the room.    ED Course as of 01/08/22 0631   Sat Jan 08, 2022   0024 Lactate: 1.1 [BRYCE]   0056 WBC(!): 25.49 [BRYCE]   0056 Hemoglobin(!): 11.8 [BRYCE]   0056 Hematocrit: 34.2 [BRYCE]   0056 Platelets: 362 [BRYCE]   0142 Procalcitonin: 0.07 [BRYCE]   0142 Glucose(!): 125 [BRYCE]   0142 BUN: 13 [BRYCE]   0142 Creatinine(!): 0.40 [BRYCE]   0142 Sodium(!): 130 [BRYCE]   0142 Potassium(!): 3.4 [BRYCE]   0142 Chloride(!): 91 [BRYCE]   0142 CO2(!): 29.4 [BRYCE]   0142 Magnesium(!): 1.3 [BRYCE]   0142 Troponin T: <0.010 [BRYCE]   0336 Patient rechecked, discussed results, and recommendation for admission for IV antibiotics and further evaluation.  Patient and son expressed understanding and agree with plan. [BRYCE]   0357 Patient care discussed with Abby HENSON, will admit to a telemetry bed per Dr. Majano. [BRYCE]      ED Course User Index  [BRYCE] Petr Rayo PA       DICTATED [employer]  Much for all of this encounter note is an electronic transcription/translation of spoken language to printed text using Dragon dictation technology.  The electronic translation of spoken language may permit an inaccurate, erroneous, or at times, nonsensical words or  phrases to be inadvertently transcribed.  Although, I have reviewed the note for such errors, some may still exist.       Eh Barrios MD  01/08/22 0631

## 2022-01-08 NOTE — H&P
Patient Name:  Bo Adan  YOB: 1930  MRN:  [id number]  Admit Date:  1/8/2022  Patient Care Team:  Hernan Cruz MD as PCP - General (Family Medicine)      Subjective   History Present Illness     Chief Complaint   Patient presents with   • Abdominal Pain       Ms. Adan is a 89 y.o. non-smoker with a history of hypertension, cervical cancer, GERD, and hyperlipidemia that presents to [facility] complaining of abdominal pain.  She reports she was called by her PCP office and told she had a WBC of 30 and should report to the ED.  The patient reports abdominal pain that is described as constant, moderate, aching, and sharp in nature.  She denies exacerbating and alleviating factors to the pain.  She states she was diagnosed with COVID 19 on 12/28/21.  She reports mild shortness of breath and chest discomfort when coughing.  She reports mild cough, sore throat, and one episode of diarrhea with dark stool.  She states she has taken 3 doses of an antibiotic that she was prescribed for pneumonia yesterday. She has also been taking steroids.  A CT of the abdomen/pelvis performed in the ED revealed consolidation within the right lower lobe, stable intra and extrahepatic biliary dilatation, and marked distention of the urinary bladder.  She received a dose of Rocephin.      History of Present Illness  Review of Systems   Constitutional: Negative for chills and fever.   HENT: Positive for sore throat. Negative for congestion.    Eyes: Negative for photophobia and visual disturbance.   Respiratory: Positive for cough, chest tightness and shortness of breath.    Cardiovascular: Negative for chest pain, palpitations and leg swelling.   Gastrointestinal: Positive for abdominal pain, blood in stool and diarrhea. Negative for nausea and vomiting.   Endocrine: Negative for polydipsia, polyphagia and polyuria.   Genitourinary: Negative for decreased urine volume, dysuria, flank  pain, frequency, hematuria and urgency.   Musculoskeletal: Negative for back pain and gait problem.   Skin: Negative for rash and wound.   Neurological: Negative for dizziness, speech difficulty, light-headedness, numbness and headaches.        Personal History     Past Medical History:   Diagnosis Date   • Arthritis    • Cancer (HCC)     cervical CA 2018 finished radiation   • Elevated cholesterol    • Enteritis    • GERD (gastroesophageal reflux disease)    • Hypertension      Past Surgical History:   Procedure Laterality Date   • CHOLECYSTECTOMY     • COLONOSCOPY     • ENDOSCOPY     • FRACTURE SURGERY      feb 23 2021     No family history on file.  Social History     Tobacco Use   • Smoking status: Never Smoker   • Smokeless tobacco: Never Used   Substance Use Topics   • Alcohol use: Not Currently   • Drug use: Not Currently     (Not in a hospital admission)    Allergies:    Allergies   Allergen Reactions   • Bactrim [Sulfamethoxazole-Trimethoprim] GI Intolerance   • Doxycycline GI Intolerance   • Fosamax [Alendronate] GI Intolerance   • Lactose Intolerance (Gi) GI Intolerance   • Macrodantin [Nitrofurantoin] GI Intolerance   • Naproxen GI Intolerance   • Zestril [Lisinopril] GI Intolerance       Objective    Objective     Vital Signs  Temp:  [97.3 °F (36.3 °C)] 97.3 °F (36.3 °C)  Heart Rate:  [] 96  Resp:  [16] 16  BP: (140-160)/() 155/87  SpO2:  [88 %-98 %] 98 %  on   ;   Device (Oxygen Therapy): room air  Body mass index is 17.58 kg/m².    Physical Exam  Vitals and nursing note reviewed.   Constitutional:       Comments: Frail, elderly   HENT:      Head: Normocephalic and atraumatic.      Nose: Nose normal.      Mouth/Throat:      Mouth: Mucous membranes are dry.      Pharynx: Oropharynx is clear.   Eyes:      Extraocular Movements: Extraocular movements intact.      Conjunctiva/sclera: Conjunctivae normal.   Cardiovascular:      Rate and Rhythm: Normal rate and regular rhythm.      Pulses: Normal  pulses.      Heart sounds: Normal heart sounds.   Pulmonary:      Effort: Pulmonary effort is normal.      Breath sounds: Examination of the right-lower field reveals rhonchi. Examination of the left-lower field reveals rhonchi. Rhonchi present.   Abdominal:      General: Abdomen is flat. Bowel sounds are normal. There is no distension.      Palpations: Abdomen is soft. There is no mass.      Tenderness: There is no abdominal tenderness. There is no guarding or rebound.      Hernia: No hernia is present.   Musculoskeletal:         General: No swelling. Normal range of motion.      Cervical back: Normal range of motion and neck supple.   Skin:     General: Skin is warm and dry.      Coloration: Skin is pale.   Neurological:      General: No focal deficit present.      Mental Status: She is alert and oriented to person, place, and time.   Psychiatric:         Mood and Affect: Mood normal.         Behavior: Behavior normal.         Results Review:  I reviewed the patient's new clinical results.  I reviewed the patient's new imaging results and agree with the interpretation.  I reviewed the patient's other test results and agree with the interpretation  I personally viewed and interpreted the patient's EKG/Telemetry data  Discussed with ED provider.    Lab Results (last 24 hours)     Procedure Component Value Units Date/Time    CBC AND DIFFERENTIAL [[id number]]  (Abnormal) Collected: 01/07/22 1532     Updated: 01/07/22 2355    POCT URINALYSIS DIPSTICK, AUTOMATED [[id number]]  (Abnormal) Collected: 01/07/22 1556     Updated: 01/07/22 2355    CBC & Differential [[id number]]  (Abnormal) Collected: 01/08/22 0037    Specimen: Blood Updated: 01/08/22 0051    Narrative:      The following orders were created for panel order CBC & Differential.  Procedure                               Abnormality         Status                     ---------                               -----------         ------                     CBC Auto  Differential[[id number]]        Abnormal            Final result                 Please view results for these tests on the individual orders.    Comprehensive Metabolic Panel [[id number]]  (Abnormal) Collected: 01/08/22 0037    Specimen: Blood Updated: 01/08/22 0138     Glucose 125 mg/dL      BUN 13 mg/dL      Creatinine 0.40 mg/dL      Sodium 130 mmol/L      Potassium 3.4 mmol/L      Chloride 91 mmol/L      CO2 29.4 mmol/L      Calcium 8.8 mg/dL      Total Protein 6.3 g/dL      Albumin 3.70 g/dL      ALT (SGPT) 16 U/L      AST (SGOT) 22 U/L      Alkaline Phosphatase 73 U/L      Total Bilirubin 0.3 mg/dL      eGFR Non African Amer 150 mL/min/1.73      Globulin 2.6 gm/dL      A/G Ratio 1.4 g/dL      BUN/Creatinine Ratio 32.5     Anion Gap 9.6 mmol/L     Narrative:      GFR Normal >60  Chronic Kidney Disease <60  Kidney Failure <15      Troponin [[id number]]  (Normal) Collected: 01/08/22 0037    Specimen: Blood Updated: 01/08/22 0138     Troponin T <0.010 ng/mL     Narrative:      Troponin T Reference Range:  <= 0.03 ng/mL-   Negative for AMI  >0.03 ng/mL-     Abnormal for myocardial necrosis.  Clinicians would have to utilize clinical acumen, EKG, Troponin and serial changes to determine if it is an Acute Myocardial Infarction or myocardial injury due to an underlying chronic condition.       Results may be falsely decreased if patient taking Biotin.      Lactic Acid, Plasma [[id number]]  (Normal) Collected: 01/08/22 0037    Specimen: Blood Updated: 01/08/22 0114     Lactate 1.1 mmol/L     Procalcitonin [[id number]]  (Normal) Collected: 01/08/22 0037    Specimen: Blood Updated: 01/08/22 0126     Procalcitonin 0.07 ng/mL     Narrative:      As a Marker for Sepsis (Non-Neonates):     1. <0.5 ng/mL represents a low risk of severe sepsis and/or septic shock.  2. >2 ng/mL represents a high risk of severe sepsis and/or septic shock.    As a Marker for Lower Respiratory Tract Infections that require antibiotic therapy:  PCT on  "Admission     Antibiotic Therapy             6-12 Hrs later  >0.5                          Strongly Recommended            >0.25 - <0.5             Recommended  0.1 - 0.25                  Discouraged                       Remeasure/reassess PCT  <0.1                         Strongly Discouraged         Remeasure/reassess PCT      As 28 day mortality risk marker: \"Change in Procalcitonin Result\" (>80% or <=80%) if Day 0 (or Day 1) and Day 4 values are available. Refer to http://[email or web address] [email or web address]-pct-[email or web address]/    Change in PCT <=80 %   A decrease of PCT levels below or equal to 80% defines a positive change in PCT test result representing a higher risk for 28-day all-cause mortality of patients diagnosed with severe sepsis or septic shock.    Change in PCT >80 %   A decrease of PCT levels of more than 80% defines a negative change in PCT result representing a lower risk for 28-day all-cause mortality of patients diagnosed with severe sepsis or septic shock.                Magnesium [[id number]]  (Abnormal) Collected: 01/08/22 0037    Specimen: Blood Updated: 01/08/22 0138     Magnesium 1.3 mg/dL     CBC Auto Differential [[id number]]  (Abnormal) Collected: 01/08/22 0037    Specimen: Blood Updated: 01/08/22 0051     WBC 25.49 10*3/mm3      RBC 3.57 10*6/mm3      Hemoglobin 11.8 g/dL      Hematocrit 34.2 %      MCV 95.8 fL      MCH 33.1 pg      MCHC 34.5 g/dL      RDW 13.1 %      RDW-SD 45.7 fl      MPV 8.7 fL      Platelets 362 10*3/mm3      Neutrophil % 93.6 %      Lymphocyte % 1.3 %      Monocyte % 4.0 %      Eosinophil % 0.0 %      Basophil % 0.2 %      Immature Grans % 0.9 %      Neutrophils, Absolute 23.88 10*3/mm3      Lymphocytes, Absolute 0.33 10*3/mm3      Monocytes, Absolute 1.01 10*3/mm3      Eosinophils, Absolute 0.00 10*3/mm3      Basophils, Absolute 0.04 10*3/mm3      Immature Grans, Absolute 0.23 10*3/mm3      nRBC 0.0 /100 WBC     Blood Culture - Blood, Arm, Right [[id number]] Collected: 01/08/22 0038    " Specimen: Blood from Arm, Right Updated: 01/08/22 0045    COVID PRE-OP / PRE-PROCEDURE SCREENING ORDER (NO ISOLATION) - Swab, Nasopharynx [[id number]] Collected: 01/08/22 0041    Specimen: Swab from Nasopharynx Updated: 01/08/22 0044    Narrative:      The following orders were created for panel order COVID PRE-OP / PRE-PROCEDURE SCREENING ORDER (NO ISOLATION) - Swab, Nasopharynx.  Procedure                               Abnormality         Status                     ---------                               -----------         ------                     COVID-19,APTIMA PANTHER(...[[id number]]                      In process                   Please view results for these tests on the individual orders.    COVID-19,APTIMA PANTHER(ALEKSANDRA),[facility], NP/OP SWAB IN UTM/VTM/SALINE TRANSPORT MEDIA,24 HR TAT - Swab, Nasopharynx [[id number]] Collected: 01/08/22 0041    Specimen: Swab from Nasopharynx Updated: 01/08/22 0044    Blood Culture - Blood, Arm, Left [[id number]] Collected: 01/08/22 0053    Specimen: Blood from Arm, Left Updated: 01/08/22 0059    Urinalysis With Microscopic If Indicated (No Culture) - Urine, Clean Catch [[id number]]  (Abnormal) Collected: 01/08/22 0114    Specimen: Urine, Clean Catch Updated: 01/08/22 0124     Color, UA Yellow     Appearance, UA Clear     pH, UA 6.5     Specific Gravity, UA 1.014     Glucose, UA Negative     Ketones, UA Negative     Bilirubin, UA Negative     Blood, UA Negative     Protein,  mg/dL (2+)     Leuk Esterase, UA Negative     Nitrite, UA Negative     Urobilinogen, UA 1.0 E.U./dL    Urinalysis, Microscopic Only - Urine, Clean Catch [[id number]] Collected: 01/08/22 0114    Specimen: Urine, Clean Catch Updated: 01/08/22 0126     RBC, UA 0-2 /HPF      WBC, UA 0-2 /HPF      Bacteria, UA None Seen /HPF      Squamous Epithelial Cells, UA 0-2 /HPF      Hyaline Casts, UA 0-2 /LPF      Methodology Automated Microscopy          Imaging Results (Last 24 Hours)     Procedure Component  Value Units Date/Time    CT Abdomen Pelvis With Contrast [[id number]] Collected: 01/08/22 0255     Updated: 01/08/22 0310    Narrative:      CT OF THE ABDOMEN AND PELVIS WITH CONTRAST     HISTORY: Abdominal pain, leukocytosis, and COVID.     COMPARISON: 08/20/2021     TECHNIQUE: Axial CT imaging was obtained through the abdomen and pelvis.  IV contrast was administered.     FINDINGS:  Images through the lung bases demonstrate areas of scarring. More focal  consolidation is noted within the right lower lobe. This area measures  up to 3.4 x 1.5 cm. It may represent pneumonia, but short-term CT  follow-up in 3 months is recommended to document resolution. The stomach  and duodenum appear unremarkable, as are the adrenal glands. Pancreas is  within normal limits for a 89-year-old patient. Calcified granulomata  are seen within the spleen. There is persistent intra and extrahepatic  biliary dilatation. Common bile duct measures up to 9 mm. This is stable  when compared to prior study. Patient did have pneumobilia the prior  exam, which is no longer seen. The degree of intrahepatic biliary  dilatation appears stable as well. Gallbladder is surgically absent. The  kidneys enhance symmetrically. Areas of cortical thinning are noted on  the left kidney, suggesting the sequela prior insults. There are  bilateral renal cysts. There is calcification of the aorta. Urinary  bladder is distended, and correlation with any symptoms of urinary  retention is recommended. Trace amount of free fluid is noted within the  pelvis. There is no evidence of bowel obstruction. There is no evidence  of appendicitis. There is lumbar scoliosis, with convexity to the left.  The patient is osteoporotic. Compression deformity of L5 is stable, as  is an old sacral fracture. 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       Impression:         1. There is an area of airspace consolidation within the right lower  lobe. This could reflect  pneumonia, but short-term CT follow-up to  document resolution is recommended.  2. Stable intra and extrahepatic biliary dilatation, although  pneumobilia is no longer seen.  3. Marked distention of the urinary bladder. Correlation with any  evidence of urinary retention is recommended.     Radiation dose reduction techniques were utilized, including automated  exposure control and exposure modulation based on body size.     This report was finalized on 1/8/2022 3:07 AM by Dr. Marixa Pat M.D.       XR Chest 1 View [[id number]] Collected: 01/08/22 0053     Updated: 01/08/22 0059    Narrative:      SINGLE VIEW OF THE CHEST     HISTORY: Cough. COVID diagnosis.     COMPARISON: None available.     FINDINGS:  Cardiomegaly is present. There is no vascular congestion. There is some  consolidation noted within the right midlung. This may reflect an area  of chronic scarring with associated bronchiectasis, although infiltrate  is not excluded. No pneumothorax is seen. No pleural effusion is  identified. No definite infiltrates are seen on the left.       Impression:      Bandlike area of consolidation noted within the right midlung.  Appearance is more suggestive of parenchymal scarring, although  infiltrate is not completely excluded. Short-term follow-up exam is  recommended.     This report was finalized on 1/8/2022 12:56 AM by Dr. Marixa Pat M.D.                 No orders to display        Assessment/Plan     Active Hospital Problems    Diagnosis  POA   • **Pneumonia of right lung due to infectious organism [J18.9]  Yes   • Hypokalemia [E87.6]  Yes   • Hypomagnesemia [E83.42]  Yes   • Essential hypertension [I10]  Yes   • GERD without esophagitis [K21.9]  Yes   • History of cervical cancer [Z85.41]  Not Applicable   • HLD (hyperlipidemia) [E78.5]  Unknown       Pneumonia of right lung  -Admit to a telemetry unit for monitoring  -Significant leukocytosis.  She reports diarrhea, will check C diff  stool  -Procal and lactate ok  -Her sats dropped to 88% while in the ED. She is currently sating 96-98% on room air  -Continue Rocephin 1 gm daily  -Blood cultures pending  -Check sputum culture  -Check urine legionella and urine strep pneumoniae  -RVP positive for COVID 19. Will keep in isolation and initiate Decadron daily  -IS  -Distention of the bladder seen on CT A/P. Check bladder scan  -Lovenox for DVT prophylaxis    Hyponatremia/Hypokalemia/Hypomagnesemia  -IVF  -Check urine studies  -Replace potassium and magnesium per protocol  -Repeat lab work in AM    Hypertension  -Blood pressures stable. Continue home regimen  -Monitor    Anemia  -Hgb stable. No signs of active bleeding  -Check iron profile  -Repeat CBC in AM    GERD/HLD  -Continue home medications    -I discussed the patients findings and my recommendations with patient.    VTE Prophylaxis - SCDs.  Code Status - Full code.       NATIVIDAD Hassan  [facility]  01/08/22  04:07 EST

## 2022-01-08 NOTE — PROGRESS NOTES
"Pharmacy Consult - Lovenox    Bo Adan has been consulted for pharmacy to dose enoxaparin for VTE ppx per Abby Rodgers APRN's request.         Relevant clinical data and objective history reviewed:  89 y.o. female 152.4 cm (60\")      Home Anticoagulation: ASA only    Past Medical History:   Diagnosis Date   • Arthritis    • Cancer (HCC)     cervical CA 2018 finished radiation   • Elevated cholesterol    • Enteritis    • GERD (gastroesophageal reflux disease)    • Hypertension      is allergic to bactrim [sulfamethoxazole-trimethoprim], doxycycline, fosamax [alendronate], lactose intolerance (gi), macrodantin [nitrofurantoin], naproxen, and zestril [lisinopril].    Lab Results   Component Value Date    WBC 25.49 (H) 01/08/2022    HGB 11.8 (L) 01/08/2022    HCT 34.2 01/08/2022    MCV 95.8 01/08/2022     01/08/2022     Lab Results   Component Value Date    GLUCOSE 125 (H) 01/08/2022    CALCIUM 8.8 01/08/2022     (L) 01/08/2022    K 3.4 (L) 01/08/2022    CO2 29.4 (H) 01/08/2022    CL 91 (L) 01/08/2022    BUN 13 01/08/2022    CREATININE 0.40 (L) 01/08/2022    EGFRIFAFRI 102 08/12/2021    EGFRIFNONA 150 01/08/2022    BCR 32.5 (H) 01/08/2022    ANIONGAP 9.6 01/08/2022       CrCl cannot be calculated (Unknown ideal weight.).    Active Inpatient Anticoagulation Orders: none    Assessment/Plan    Will start patient on 30 mg  subcutaneous every 24 hours, adjusted for renal function . Pharmacy will continue to follow.     Marcel Lozano RPH    "

## 2022-01-08 NOTE — ED NOTES
Nursing report ED to floor  Bo Adan  89 y.o.  female    HPI :   Chief Complaint   Patient presents with   • Abdominal Pain       Admitting doctor:   Aleksander Majano MD    Admitting diagnosis:   The primary encounter diagnosis was Pneumonia of right lung due to infectious organism, unspecified part of lung. Diagnoses of Leukocytosis, unspecified type, Hypomagnesemia, COVID-19 virus infection, Abdominal pain, unspecified abdominal location, Hypertension, unspecified type, and Hyperlipidemia, unspecified hyperlipidemia type were also pertinent to this visit.    Code status:   Current Code Status     Date Active Code Status Order ID Comments User Context       1/8/2022 0406 CPR (Attempt to Resuscitate) [id number]  Abby Rodgers APRN ED     Advance Care Planning Activity      Questions for Current Code Status     Question Answer    Code Status (Patient has no pulse and is not breathing) CPR (Attempt to Resuscitate)    Medical Interventions (Patient has pulse or is breathing) Full Support          Allergies:   Bactrim [sulfamethoxazole-trimethoprim], Doxycycline, Fosamax [alendronate], Lactose intolerance (gi), Macrodantin [nitrofurantoin], Naproxen, and Zestril [lisinopril]    Intake and Output    Intake/Output Summary (Last 24 hours) at 1/8/2022 0540  Last data filed at 1/8/2022 0504  Gross per 24 hour   Intake 550 ml   Output --   Net 550 ml       Weight:   There were no vitals filed for this visit.    Most recent vitals:   Vitals:    01/08/22 0410 01/08/22 0411 01/08/22 0441 01/08/22 0511   BP:  161/84 138/76 152/81   Pulse: 89  80 79   Resp:       Temp:       TempSrc:       SpO2: 93%  93% 93%   Height:           Active LDAs/IV Access:   Lines, Drains & Airways     Active LDAs     Name Placement date Placement time Site Days    Peripheral IV 01/08/22 0019 Right Antecubital 01/08/22  0019  Antecubital  less than 1                Labs (abnormal labs have a star):   Labs Reviewed   COVID-19,APTIMA  FATIMAH (ALEKSANDRA) AMBAR/ TROY, NP/OP SWAB IN UTM/VTM/SALINE TRANSPORT MEDIA,24 HR TAT - Abnormal; Notable for the following components:       Result Value    COVID19 Detected (*)     All other components within normal limits    Narrative:     Fact sheet for providers: https://www.fda.gov/media/968504/download     Fact sheet for patients: https://www.fda.gov/media/666900/download    Test performed by RT PCR.   COMPREHENSIVE METABOLIC PANEL - Abnormal; Notable for the following components:    Glucose 125 (*)     Creatinine 0.40 (*)     Sodium 130 (*)     Potassium 3.4 (*)     Chloride 91 (*)     CO2 29.4 (*)     BUN/Creatinine Ratio 32.5 (*)     All other components within normal limits    Narrative:     GFR Normal >60  Chronic Kidney Disease <60  Kidney Failure <15     URINALYSIS W/ MICROSCOPIC IF INDICATED (NO CULTURE) - Abnormal; Notable for the following components:    Protein,  mg/dL (2+) (*)     All other components within normal limits   MAGNESIUM - Abnormal; Notable for the following components:    Magnesium 1.3 (*)     All other components within normal limits   CBC WITH AUTO DIFFERENTIAL - Abnormal; Notable for the following components:    WBC 25.49 (*)     RBC 3.57 (*)     Hemoglobin 11.8 (*)     MCH 33.1 (*)     Neutrophil % 93.6 (*)     Lymphocyte % 1.3 (*)     Monocyte % 4.0 (*)     Eosinophil % 0.0 (*)     Immature Grans % 0.9 (*)     Neutrophils, Absolute 23.88 (*)     Lymphocytes, Absolute 0.33 (*)     Monocytes, Absolute 1.01 (*)     Immature Grans, Absolute 0.23 (*)     All other components within normal limits   TROPONIN (IN-HOUSE) - Normal    Narrative:     Troponin T Reference Range:  <= 0.03 ng/mL-   Negative for AMI  >0.03 ng/mL-     Abnormal for myocardial necrosis.  Clinicians would have to utilize clinical acumen, EKG, Troponin and serial changes to determine if it is an Acute Myocardial Infarction or myocardial injury due to an underlying chronic condition.       Results may be falsely  "decreased if patient taking Biotin.     LACTIC ACID, PLASMA - Normal   PROCALCITONIN - Normal    Narrative:     As a Marker for Sepsis (Non-Neonates):     1. <0.5 ng/mL represents a low risk of severe sepsis and/or septic shock.  2. >2 ng/mL represents a high risk of severe sepsis and/or septic shock.    As a Marker for Lower Respiratory Tract Infections that require antibiotic therapy:  PCT on Admission     Antibiotic Therapy             6-12 Hrs later  >0.5                          Strongly Recommended            >0.25 - <0.5             Recommended  0.1 - 0.25                  Discouraged                       Remeasure/reassess PCT  <0.1                         Strongly Discouraged         Remeasure/reassess PCT      As 28 day mortality risk marker: \"Change in Procalcitonin Result\" (>80% or <=80%) if Day 0 (or Day 1) and Day 4 values are available. Refer to http://www.[employer] [facility]calculator.com/    Change in PCT <=80 %   A decrease of PCT levels below or equal to 80% defines a positive change in PCT test result representing a higher risk for 28-day all-cause mortality of patients diagnosed with severe sepsis or septic shock.    Change in PCT >80 %   A decrease of PCT levels of more than 80% defines a negative change in PCT result representing a lower risk for 28-day all-cause mortality of patients diagnosed with severe sepsis or septic shock.               COVID PRE-OP / PRE-PROCEDURE SCREENING ORDER (NO ISOLATION)    Narrative:     The following orders were created for panel order COVID PRE-OP / PRE-PROCEDURE SCREENING ORDER (NO ISOLATION) - Swab, Nasopharynx.  Procedure                               Abnormality         Status                     ---------                               -----------         ------                     COVID-19,APTIMA PANTHER(...[[id number]]  Abnormal            Final result                 Please view results for these tests on the individual orders.   BLOOD CULTURE   BLOOD CULTURE "   RESPIRATORY CULTURE   STREP PNEUMO AG, URINE OR CSF   LEGIONELLA ANTIGEN, URINE   COVID-19,[facility] IN-HOUSE CEPHEID/KIRA, NP SWAB IN TRANSPORT MEDIA 8-12 HR TAT   URINALYSIS, MICROSCOPIC ONLY   CHLORIDE, URINE, RANDOM    Narrative:     Reference intervals for random urine have not been established.  Clinical usage is dependent upon physician's interpretation in combination with other laboratory tests.      OSMOLALITY, URINE    Narrative:     Osmo Normal Reference Ranges:    Random:  mOsm/kg H2O, depending on fluid intake.  Random: >850 mOsm/kg H20, after 12 hour fluid restriction.    24 Hour: 300-900 mOsm/kg H2O.   SODIUM, URINE, RANDOM    Narrative:     Reference intervals for random urine have not been established.  Clinical usage is dependent upon physician's interpretation in combination with other laboratory tests.      BASIC METABOLIC PANEL   CBC (NO DIFF)   MAGNESIUM   CBC AND DIFFERENTIAL    Narrative:     The following orders were created for panel order CBC & Differential.  Procedure                               Abnormality         Status                     ---------                               -----------         ------                     CBC Auto Differential[[id number]]        Abnormal            Final result                 Please view results for these tests on the individual orders.       EKG:   No orders to display       Meds given in ED:   Medications   sodium chloride 0.9 % flush 10 mL (has no administration in time range)   cefTRIAXone (ROCEPHIN) 1 g in sodium chloride 0.9 % 100 mL IVPB-VTB (has no administration in time range)   sodium chloride 0.9 % flush 10 mL (has no administration in time range)   sodium chloride 0.9 % flush 10 mL (has no administration in time range)   nitroglycerin (NITROSTAT) SL tablet 0.4 mg (has no administration in time range)   acetaminophen (TYLENOL) tablet 650 mg (has no administration in time range)     Or   acetaminophen (TYLENOL) 160 MG/5ML solution  650 mg (has no administration in time range)     Or   acetaminophen (TYLENOL) suppository 650 mg (has no administration in time range)   ondansetron (ZOFRAN) tablet 4 mg (has no administration in time range)     Or   ondansetron (ZOFRAN) injection 4 mg (has no administration in time range)   calcium carbonate (TUMS) chewable tablet 500 mg (200 mg elemental) (has no administration in time range)   sodium chloride 0.9 % infusion (75 mL/hr Intravenous New Bag 1/8/22 0504)   potassium chloride (K-DUR,KLOR-CON) ER tablet 40 mEq (has no administration in time range)     Or   potassium chloride (KLOR-CON) packet 40 mEq (has no administration in time range)     Or   potassium chloride 10 mEq in 100 mL IVPB (has no administration in time range)   Magnesium Sulfate 2 gram Bolus, followed by 8 gram infusion (total Mg dose 10 grams)- Mg less than or equal to 1mg/dL (has no administration in time range)     Or   Magnesium Sulfate 2 gram / 50mL Infusion (GIVE X 3 BAGS TO EQUAL 6GM TOTAL DOSE) - Mg 1.1 - 1.5 mg/dl (has no administration in time range)     Or   Magnesium Sulfate 4 gram infusion- Mg 1.6-1.9 mg/dL (has no administration in time range)   ipratropium (ATROVENT HFA) inhaler 2 puff (has no administration in time range)     And   albuterol sulfate HFA (PROVENTIL HFA;VENTOLIN HFA;PROAIR HFA) inhaler 2 puff (has no administration in time range)   sodium chloride 0.9 % bolus 500 mL (0 mL Intravenous Stopped 1/8/22 0124)   iopamidol (ISOVUE-300) 61 % injection 100 mL (100 mL Intravenous Given 1/8/22 0241)   magnesium sulfate 2g/50 mL (PREMIX) infusion (0 g Intravenous Stopped 1/8/22 0504)   cefTRIAXone (ROCEPHIN) 1 g in sodium chloride 0.9 % 100 mL IVPB-VTB (1 g Intravenous New Bag 1/8/22 0504)       Imaging results:  CT Abdomen Pelvis With Contrast    Result Date: 1/8/2022   1. There is an area of airspace consolidation within the right lower lobe. This could reflect pneumonia, but short-term CT follow-up to document  resolution is recommended. 2. Stable intra and extrahepatic biliary dilatation, although pneumobilia is no longer seen. 3. Marked distention of the urinary bladder. Correlation with any evidence of urinary retention is recommended.  Radiation dose reduction techniques were utilized, including automated exposure control and exposure modulation based on body size.  This report was finalized on 1/8/2022 3:07 AM by Dr. Marixa Pat M.D.      XR Chest 1 View    Result Date: 1/8/2022  Bandlike area of consolidation noted within the right midlung. Appearance is more suggestive of parenchymal scarring, although infiltrate is not completely excluded. Short-term follow-up exam is recommended.  This report was finalized on 1/8/2022 12:56 AM by Dr. Marixa Pat M.D.        Ambulatory status:   - as tolerated    Social issues:   Social History     Socioeconomic History   • Marital status:    Tobacco Use   • Smoking status: Never Smoker   • Smokeless tobacco: Never Used   Substance and Sexual Activity   • Alcohol use: Not Currently   • Drug use: Not Currently   • Sexual activity: Defer       NIH Stroke Scale:        Nursing report ED to floor:       Dwayne Augustine RN  01/08/22 5036

## 2022-01-08 NOTE — PLAN OF CARE
Goal Outcome Evaluation:  Plan of Care Reviewed With: patient        Progress: improving  Outcome Summary: VSS on room air. No complaints of abdominal pain. Denies SOB. Had BMx2 today. Remains on IVF and IV antibiotic treatment. stool sample has collected. potasium and mag replaced per protocol. will recheck labs in a.m.

## 2022-01-08 NOTE — ED NOTES
Pt to triage from home with c/o abnormal labs.  Pt has had abdominal pain x 3 days, went to pmd today, put on antibiotics, got notification from pmd @ 2300 that labs were abnormal. Pt also has had black stools for the last few days. Pt wearing mask in triage. Triage personnel wore appropriate PPE       Wendy Virk RN  01/08/22 0002

## 2022-01-08 NOTE — ED NOTES
Patient was placed in face mask during first look triage.  Patient was wearing a face mask throughout encounter.  I wore personal protective equipment throughout the encounter.  Hand hygiene was performed before and after patient encounter.       Dwayne Augustine RN  01/08/22 0009

## 2022-01-09 PROBLEM — R19.5 OCCULT GI BLEEDING: Status: ACTIVE | Noted: 2022-01-09

## 2022-01-09 LAB
BASOPHILS # BLD AUTO: 0.03 10*3/MM3 (ref 0–0.2)
BASOPHILS NFR BLD AUTO: 0.1 % (ref 0–1.5)
D DIMER PPP FEU-MCNC: 0.85 MCGFEU/ML (ref 0–0.49)
DEPRECATED RDW RBC AUTO: 47.9 FL (ref 37–54)
EOSINOPHIL # BLD AUTO: 0 10*3/MM3 (ref 0–0.4)
EOSINOPHIL NFR BLD AUTO: 0 % (ref 0.3–6.2)
ERYTHROCYTE [DISTWIDTH] IN BLOOD BY AUTOMATED COUNT: 13.1 % (ref 12.3–15.4)
HCT VFR BLD AUTO: 34 % (ref 34–46.6)
HGB BLD-MCNC: 11.2 G/DL (ref 12–15.9)
IMM GRANULOCYTES # BLD AUTO: 0.27 10*3/MM3 (ref 0–0.05)
IMM GRANULOCYTES NFR BLD AUTO: 1.2 % (ref 0–0.5)
L PNEUMO1 AG UR QL IA: NEGATIVE
LYMPHOCYTES # BLD AUTO: 0.2 10*3/MM3 (ref 0.7–3.1)
LYMPHOCYTES NFR BLD AUTO: 0.9 % (ref 19.6–45.3)
MAGNESIUM SERPL-MCNC: 1.8 MG/DL (ref 1.7–2.3)
MCH RBC QN AUTO: 32.7 PG (ref 26.6–33)
MCHC RBC AUTO-ENTMCNC: 32.9 G/DL (ref 31.5–35.7)
MCV RBC AUTO: 99.4 FL (ref 79–97)
MONOCYTES # BLD AUTO: 0.33 10*3/MM3 (ref 0.1–0.9)
MONOCYTES NFR BLD AUTO: 1.4 % (ref 5–12)
NEUTROPHILS NFR BLD AUTO: 22.2 10*3/MM3 (ref 1.7–7)
NEUTROPHILS NFR BLD AUTO: 96.4 % (ref 42.7–76)
NRBC BLD AUTO-RTO: 0 /100 WBC (ref 0–0.2)
PLATELET # BLD AUTO: 394 10*3/MM3 (ref 140–450)
PMV BLD AUTO: 8.5 FL (ref 6–12)
RBC # BLD AUTO: 3.42 10*6/MM3 (ref 3.77–5.28)
S PNEUM AG SPEC QL LA: NEGATIVE
WBC NRBC COR # BLD: 23.03 10*3/MM3 (ref 3.4–10.8)

## 2022-01-09 PROCEDURE — 97161 PT EVAL LOW COMPLEX 20 MIN: CPT

## 2022-01-09 PROCEDURE — 97530 THERAPEUTIC ACTIVITIES: CPT

## 2022-01-09 PROCEDURE — 25010000002 CEFTRIAXONE PER 250 MG: Performed by: NURSE PRACTITIONER

## 2022-01-09 PROCEDURE — 87147 CULTURE TYPE IMMUNOLOGIC: CPT | Performed by: NURSE PRACTITIONER

## 2022-01-09 PROCEDURE — 83735 ASSAY OF MAGNESIUM: CPT | Performed by: INTERNAL MEDICINE

## 2022-01-09 PROCEDURE — 99222 1ST HOSP IP/OBS MODERATE 55: CPT | Performed by: INTERNAL MEDICINE

## 2022-01-09 PROCEDURE — 87186 SC STD MICRODIL/AGAR DIL: CPT | Performed by: NURSE PRACTITIONER

## 2022-01-09 PROCEDURE — 94799 UNLISTED PULMONARY SVC/PX: CPT

## 2022-01-09 PROCEDURE — 85025 COMPLETE CBC W/AUTO DIFF WBC: CPT | Performed by: INTERNAL MEDICINE

## 2022-01-09 PROCEDURE — 87899 AGENT NOS ASSAY W/OPTIC: CPT | Performed by: NURSE PRACTITIONER

## 2022-01-09 PROCEDURE — 87205 SMEAR GRAM STAIN: CPT | Performed by: NURSE PRACTITIONER

## 2022-01-09 PROCEDURE — 85379 FIBRIN DEGRADATION QUANT: CPT | Performed by: NURSE PRACTITIONER

## 2022-01-09 PROCEDURE — 25010000002 AZITHROMYCIN PER 500 MG: Performed by: INTERNAL MEDICINE

## 2022-01-09 PROCEDURE — 97110 THERAPEUTIC EXERCISES: CPT

## 2022-01-09 PROCEDURE — 87070 CULTURE OTHR SPECIMN AEROBIC: CPT | Performed by: NURSE PRACTITIONER

## 2022-01-09 PROCEDURE — 25010000002 ENOXAPARIN PER 10 MG: Performed by: INTERNAL MEDICINE

## 2022-01-09 RX ORDER — CALCIUM POLYCARBOPHIL 625 MG
625 TABLET ORAL 2 TIMES DAILY
Status: DISCONTINUED | OUTPATIENT
Start: 2022-01-09 | End: 2022-01-11

## 2022-01-09 RX ADMIN — GUAIFENESIN 600 MG: 600 TABLET, EXTENDED RELEASE ORAL at 20:33

## 2022-01-09 RX ADMIN — ENOXAPARIN SODIUM 40 MG: 40 INJECTION SUBCUTANEOUS at 08:42

## 2022-01-09 RX ADMIN — TRAZODONE HYDROCHLORIDE 50 MG: 50 TABLET ORAL at 20:33

## 2022-01-09 RX ADMIN — AMLODIPINE BESYLATE 5 MG: 5 TABLET ORAL at 08:42

## 2022-01-09 RX ADMIN — SODIUM CHLORIDE, PRESERVATIVE FREE 10 ML: 5 INJECTION INTRAVENOUS at 08:42

## 2022-01-09 RX ADMIN — TIMOLOL MALEATE 1 DROP: 2.5 SOLUTION OPHTHALMIC at 08:42

## 2022-01-09 RX ADMIN — ACETAMINOPHEN 650 MG: 325 TABLET, FILM COATED ORAL at 00:08

## 2022-01-09 RX ADMIN — HYDROCORTISONE ACETATE 25 MG: 25 SUPPOSITORY RECTAL at 20:34

## 2022-01-09 RX ADMIN — AZITHROMYCIN MONOHYDRATE 500 MG: 500 INJECTION, POWDER, LYOPHILIZED, FOR SOLUTION INTRAVENOUS at 12:34

## 2022-01-09 RX ADMIN — FOLIC ACID 1 MG: 1 TABLET ORAL at 08:42

## 2022-01-09 RX ADMIN — CARVEDILOL 25 MG: 25 TABLET, FILM COATED ORAL at 08:42

## 2022-01-09 RX ADMIN — BUDESONIDE AND FORMOTEROL FUMARATE DIHYDRATE 2 PUFF: 160; 4.5 AEROSOL RESPIRATORY (INHALATION) at 20:24

## 2022-01-09 RX ADMIN — DULOXETINE HYDROCHLORIDE 30 MG: 30 CAPSULE, DELAYED RELEASE ORAL at 08:42

## 2022-01-09 RX ADMIN — SODIUM CHLORIDE 75 ML/HR: 9 INJECTION, SOLUTION INTRAVENOUS at 08:44

## 2022-01-09 RX ADMIN — PANTOPRAZOLE SODIUM 40 MG: 40 INJECTION, POWDER, FOR SOLUTION INTRAVENOUS at 05:15

## 2022-01-09 RX ADMIN — DEXAMETHASONE 6 MG: 6 TABLET ORAL at 08:42

## 2022-01-09 RX ADMIN — CALCIUM POLYCARBOPHIL 625 MG: 625 TABLET, FILM COATED ORAL at 22:37

## 2022-01-09 RX ADMIN — CARVEDILOL 25 MG: 25 TABLET, FILM COATED ORAL at 17:47

## 2022-01-09 RX ADMIN — HYDROCORTISONE ACETATE 25 MG: 25 SUPPOSITORY RECTAL at 08:42

## 2022-01-09 RX ADMIN — SODIUM CHLORIDE, PRESERVATIVE FREE 10 ML: 5 INJECTION INTRAVENOUS at 20:34

## 2022-01-09 RX ADMIN — GUAIFENESIN 600 MG: 600 TABLET, EXTENDED RELEASE ORAL at 08:42

## 2022-01-09 RX ADMIN — CEFTRIAXONE 1 G: 1 INJECTION, POWDER, FOR SOLUTION INTRAMUSCULAR; INTRAVENOUS at 02:32

## 2022-01-09 RX ADMIN — LATANOPROST 1 DROP: 50 SOLUTION OPHTHALMIC at 20:34

## 2022-01-09 RX ADMIN — BUDESONIDE AND FORMOTEROL FUMARATE DIHYDRATE 2 PUFF: 160; 4.5 AEROSOL RESPIRATORY (INHALATION) at 09:26

## 2022-01-09 RX ADMIN — ASPIRIN 81 MG: 81 TABLET, COATED ORAL at 08:42

## 2022-01-09 RX ADMIN — ATORVASTATIN CALCIUM 40 MG: 20 TABLET, FILM COATED ORAL at 20:33

## 2022-01-09 NOTE — PLAN OF CARE
Goal Outcome Evaluation:  Plan of Care Reviewed With: patient        Progress: improving  Outcome Summary: pt remains on RA and denies SOA. VSS. Continue IVF and abx per MD orders. Tylenol given for HA per pt request. See MAR. Denies other pain.

## 2022-01-09 NOTE — PLAN OF CARE
Goal Outcome Evaluation:  Plan of Care Reviewed With: patient        Progress: no change  Outcome Summary: Pt is a 89 yo female admitted 1/8/22 with pneumonia of R lung diagnosed with COVID-19. PMH includes but not limited to: HTN, CA, HLD. Pt is A&Ox4 and reports she lives with spouse in house with 1 KARL, was using a RW and was independent with mobility prior to admission. Pt on RA with O2 sat >90% throughout session. Pt currently transfers and ambulates 8ft with RW with assist x 1 demonstrating generalized weakness, impaired balance, and decreased activity tolerance. Pt could benefit from skilled PT to address deficits. PT recommends  PT to facilitate progress    Patient was not wearing a face mask during this therapy encounter. Therapist used appropriate personal protective equipment including gown, eye protection, mask and gloves.  Mask used was N95/duckbill. Appropriate PPE was worn during the entire therapy session. Hand hygiene was completed before and after therapy session. Patient is in enhanced droplet precautions.

## 2022-01-09 NOTE — PROGRESS NOTES
Name: Bo Adan ADMIT: 2022   : 10/24/1930  PCP: Hernan Cruz MD    MRN: [id number] LOS: 1 days   AGE/SEX: 89 y.o. female  ROOM: [facility]     Subjective   Subjective   Patient continues with cough and dyspnea less than yesterday.  Cough is productive of clear sputum.  Positive chills.  No fever.  No chest pain.  No palpitation.  No wheezing.  No PND.  No orthopnea.  Patient complaining of relapse of her lower abdominal pain.  Positive loose stool with the stool incontinence.  No fresh bright blood per rectum or melena.  Positive nausea but no vomiting.    Review of Systems  .  No dysuria or hematuria.     Objective   Objective   Vital Signs  Temp:  [97.3 °F (36.3 °C)-98.6 °F (37 °C)] 98.3 °F (36.8 °C)  Heart Rate:  [] 114  Resp:  [16-20] 16  BP: (119-160)/(73-97) 160/97  SpO2:  [94 %-98 %] 96 %  on   ;   Device (Oxygen Therapy): room air    Intake/Output Summary (Last 24 hours) at 2022 1055  Last data filed at 2022 0500  Gross per 24 hour   Intake 340 ml   Output 1350 ml   Net -1010 ml     Physical Exam  General.  Elderly female.  She is alert and oriented x3.  In no respiratory distress or pain.  Appears anxious.  Chronically ill-appearing.  Eyes.  Right pupil larger than the left.  Status post bilateral cataract surgery.  No pallor or jaundice.  Intact extraocular musculature.  Oral cavity.  Mildly erythematous throat and no exudate.  Moist mucous membrane.  Chest.  Poor bilateral air entry with scattered bilateral rhonchi (improved from yesterday).  Neck.  Supple.  No JVD.  No lymphadenopathy or thyromegaly.  Cardiovascular.  Regular rate and rhythm.  Grade 2 systolic murmur.  Mild tachycardia.  Abdomen.  Soft lax.  No tenderness.  No organomegaly.  No guarding or rebound.  No distention.  Normal bowel sounds.  Extremity.  There are no clubbing cyanosis or edema.    Results Review:      Results from last 7 days   Lab Units 22  1635 22  1127 22  0037    SODIUM mmol/L 135* 135* 130*   POTASSIUM mmol/L 4.9 3.8 3.4*   CHLORIDE mmol/L 97* 97* 91*   CO2 mmol/L 24.8 28.3 29.4*   BUN mg/dL 9 7* 13   CREATININE mg/dL 0.44* 0.35* 0.40*   GLUCOSE mg/dL 112* 145* 125*   CALCIUM mg/dL 8.5 8.6 8.8   AST (SGOT) U/L 17 --  22   ALT (SGPT) U/L 15  --  16     CrCl cannot be calculated (Unknown ideal weight.).          Results from last 7 days   Lab Units 01/08/22 0037   TROPONIN T ng/mL <0.010             Results from last 7 days   Lab Units 01/08/22 1127 01/08/22 0037   MAGNESIUM mg/dL 2.3 1.3*           Invalid input(s): LDLCALC  Results from last 7 days   Lab Units 01/08/22 1127 01/08/22 0037 01/08/22 0037   WBC 10*3/mm3 24.87*  --  25.49*   HEMOGLOBIN g/dL 11.3*  --  11.8*   HEMATOCRIT % 32.4*  --  34.2   PLATELETS 10*3/mm3 420  --  362   MCV fL 95.0   < > 95.8   MCH pg 33.1*   < > 33.1*   MCHC g/dL 34.9   < > 34.5   RDW % 13.3   < > 13.1   RDW-SD fl 45.6   < > 45.7   MPV fL 8.9   < > 8.7   NEUTROPHIL % %  --   --  93.6*   LYMPHOCYTE % %  --   --  1.3*   MONOCYTES % %  --   --  4.0*   EOSINOPHIL % %  --   --  0.0*   BASOPHIL % %  --   --  0.2   IMM GRAN % %  --   --  0.9*   NEUTROS ABS 10*3/mm3  --   --  23.88*   LYMPHS ABS 10*3/mm3  --   --  0.33*   MONOS ABS 10*3/mm3  --   --  1.01*   EOS ABS 10*3/mm3  --   --  0.00   BASOS ABS 10*3/mm3  --   --  0.04   IMMATURE GRANS (ABS) 10*3/mm3  --   --  0.23*   NRBC /100 WBC  --   --  0.0    < > = values in this interval not displayed.             Results from last 7 days   Lab Units 01/08/22  1127 01/08/22  0037   PROCALCITONIN ng/mL 0.05 0.07   LACTATE mmol/L  --  1.1     Results from last 7 days   Lab Units 01/08/22  1635   CRP mg/dL 7.15*     Results from last 7 days   Lab Units 01/08/22  1635   LIPASE U/L 45     Results from last 7 days   Lab Units 01/08/22  0053 01/08/22  0038   BLOODCX  No growth at 24 hours No growth at 24 hours     Results from last 7 days   Lab Units 01/08/22  1314 01/08/22  1309   ADENOVIRUS  DETECTION BY PCR   --  Not Detected   ADENOVIRUS  Not Detected  --    CORONAVIRUS 229E   --  Not Detected   CORONAVIRUS HKU1   --  Not Detected   CORONAVIRUS NL63   --  Not Detected   CORONAVIRUS OC43   --  Not Detected   HUMAN METAPNEUMOVIRUS   --  Not Detected   HUMAN RHINOVIRUS/ENTEROVIRUS   --  Not Detected   INFLUENZA B PCR   --  Not Detected   PARAINFLUENZA 1   --  Not Detected   PARAINFLUENZA VIRUS 2   --  Not Detected   PARAINFLUENZA VIRUS 3   --  Not Detected   PARAINFLUENZA VIRUS 4   --  Not Detected   BORDETELLA PERTUSSIS PCR   --  Not Detected   CHLAMYDOPHILA PNEUMONIAE PCR   --  Not Detected   MYCOPLAMA PNEUMO PCR   --  Not Detected   INFLUENZA A PCR   --  Not Detected   RSV, PCR   --  Not Detected     Results from last 7 days   Lab Units 01/08/22  0114   NITRITE UA  Negative   WBC UA /HPF 0-2   BACTERIA UA /HPF None Seen   SQUAM EPITHEL UA /HPF 0-2     Results from last 7 days   Lab Units 01/08/22  0114   SODIUM UR mmol/L 27   CHLORIDE UR mmol/L 23   OSMOLALITY UR mOsm/kg 312       Imaging:  Imaging Results (Last 24 Hours)     ** No results found for the last 24 hours. **             I reviewed the patient's new clinical results / labs / tests / procedures      Assessment/Plan     Active Hospital Problems    Diagnosis  POA   • **Pneumonia of right lung due to infectious organism [J18.9]  Yes   • Occult GI bleeding [R19.5]  Yes   • Hypokalemia [E87.6]  Yes   • Hypomagnesemia [E83.42]  Yes   • Essential hypertension [I10]  Yes   • GERD without esophagitis [K21.9]  Yes   • History of cervical cancer [Z85.41]  Not Applicable   • HLD (hyperlipidemia) [E78.5]  Yes      Resolved Hospital Problems   No resolved problems to display.       1.  Lower abdominal pain/diarrhea/questionable melena/urinary retention by CAT scan.    Continues with abdominal pain.  Benign GI examination.  Urinary retention has resolved clinically.   Place on urinary retention protocol.  Stool for C. difficile/stool PCR are negative.   Positive blood Hemoccult stool.  Lipase is normal.  CT scan of the abdomen revealed no acute intra-abdominal process..  I believe the patient is too sick to under go endoscopies at this time.  Will consult GI.  Monitor closely.  Globin stable.  Continue Protonix.  2 pneumonia (Covid with possible superimposed bacterial infection)/hypoxemia/leukocytosis.  Patient is out of the window for remdesivir treatment.  We will continue the patient on Decadron/Rocephin/Zithromax..  Will support with oxygen as needed.  We will initiate Mucinex and Symbicort.  Pending urine Legionella and Streptococcus antigen.  Pending sputum and blood cultures.  Negative respiratory PCR panel except for Covid.  Improving FiO2 demands. Please note that the leukocytosis could also be secondary to steroids.  Awaiting D-dimer.   follow-up on inflammatory markers.  It is worth mentioning that the patient had a right-sided pneumonia about 2 months ago and a follow up chest x-ray did not clear completely so I am going to get a CAT scan to assure no postobstructive pneumonia  3.  Anemia.  Hemoglobin stable about the baseline of 10.4-11.4.  Will monitor.  4. Hypomagnesemia/hypokalemia/hyponatremia.  Hyponatremia is secondary to dehydration which has improved on slow IV fluid.  We will stop IV fluid.  Hypokalemia resolved with substitution.  Awaiting magnesium level.. Normal renal function.  Patient appears to be euvolemic today.  5.  Hypertension.  No evidence of angina or congestive heart failure.  We will continue Norvasc and Coreg and monitor.  Patient is tachycardic.  Blood pressure improving.  6.  Hyperlipidemia.  Continue Lipitor.  7.  VTE prophylaxis with Lovenox.      Discussed with the patient/the daughter.  Disposition.  To be determined based on clinical course.                  Tawanna Vaughn MD  [facility]  01/09/22  10:55 EST

## 2022-01-09 NOTE — THERAPY EVALUATION
Patient Name: Bo Adan  : 10/24/1930    MRN: [id number]                              Today's Date: 2022       Admit Date: 2022    Visit Dx:     ICD-10-CM ICD-9-CM   1. Pneumonia of right lung due to infectious organism, unspecified part of lung  J18.9 483.8   2. Leukocytosis, unspecified type  D72.829 288.60   3. Hypomagnesemia  E83.42 275.2   4. COVID-19 virus infection  U07.1 079.89   5. Abdominal pain, unspecified abdominal location  R10.9 789.00   6. Hypertension, unspecified type  I10 401.9   7. Hyperlipidemia, unspecified hyperlipidemia type  E78.5 272.4     Patient Active Problem List   Diagnosis   • Anemia   • Diarrhea of presumed infectious origin   • Essential hypertension   • GERD without esophagitis   • History of cervical cancer   • History of pancreatitis   • HLD (hyperlipidemia)   • Chest pain   • Diarrhea   • Enteritis   • Hypokalemia   • Hypomagnesemia   • Pneumonia of right lung due to infectious organism     Past Medical History:   Diagnosis Date   • Arthritis    • Cancer (HCC)     cervical CA 2018 finished radiation   • Elevated cholesterol    • Enteritis    • GERD (gastroesophageal reflux disease)    • Hypertension      Past Surgical History:   Procedure Laterality Date   • CHOLECYSTECTOMY     • COLONOSCOPY     • ENDOSCOPY     • FRACTURE SURGERY      2021      General Information     Row Name 22 1032          Physical Therapy Time and Intention    Document Type evaluation  -SR     Mode of Treatment physical therapy; individual therapy  -SR     Row Name 22 1032          General Information    Patient Profile Reviewed yes  -SR     Prior Level of Function independent:; all household mobility  -SR     Existing Precautions/Restrictions fall  -SR     Barriers to Rehab none identified  -SR     Row Name 22 1032          Living Environment    Lives With spouse  -SR     Row Name 22 1032          Home Main Entrance    Number of Stairs, Main Entrance none  -SR      Row Name 01/09/22 1032          Stairs Within Home, Primary    Stairs, Within Home, Primary one story home  -SR     Row Name 01/09/22 1032          Cognition    Orientation Status (Cognition) oriented x 4  -SR     Row Name 01/09/22 1032          Safety Issues, Functional Mobility    Impairments Affecting Function (Mobility) balance; endurance/activity tolerance; strength  -SR     Comment, Safety Issues/Impairments (Mobility) gait belt and non slip socks for safety  -SR           User Key  (r) = Recorded By, (t) = Taken By, (c) = Cosigned By    Initials Name Provider Type    SR Christi Au Physical Therapist               Mobility     Row Name 01/09/22 1033          Bed Mobility    Bed Mobility rolling left; rolling right; supine-sit  -SR     Rolling Left [place] (Bed Mobility) contact guard  -SR     Rolling Right [place] (Bed Mobility) contact guard  -SR     Supine-Sit [place] (Bed Mobility) minimum assist (75% patient effort)  -SR     Assistive Device (Bed Mobility) bed rails  -SR     Comment (Bed Mobility) pt rolled R/L for carrington care. Pt performed supine > sitting EOB with Christo HHA to elevate trunk  -SR     Row Name 01/09/22 1033          Transfers    Comment (Transfers) sit <> stand with RW CGA for safety  -SR     Row Name 01/09/22 1033          Sit-Stand Transfer    Sit-Stand [place] (Transfers) contact guard; 1 person assist; verbal cues  -SR     Assistive Device (Sit-Stand Transfers) walker, front-wheeled  -SR     Row Name 01/09/22 1033          Gait/Stairs (Locomotion)    [place] Level (Gait) contact guard; 1 person assist; verbal cues  -SR     Assistive Device (Gait) walker, front-wheeled  -SR     Distance in Feet (Gait) 8ft  -SR     Deviations/Abnormal Patterns (Gait) lawson decreased; festinating/shuffling; stride length decreased  -SR     Bilateral Gait Deviations forward flexed posture  -SR     Comment (Gait/Stairs) Pt ambulated 8ft with RW CGA with cues for upright  posture, no LOB, distance limited by activity tolerance  -SR           User Key  (r) = Recorded By, (t) = Taken By, (c) = Cosigned By    Initials Name Provider Type    SR Christi Au Physical Therapist               Obj/Interventions     Row Name 01/09/22 1035          Range of Motion Comprehensive    General Range of Motion no range of motion deficits identified  -[facility] Name 01/09/22 1035          Strength Comprehensive (MMT)    General Manual Muscle Testing (MMT) Assessment lower extremity strength deficits identified  -SR     Comment, General Manual Muscle Testing (MMT) Assessment grossly 3+/5 BLE tested functionally  -SR     Row Name 01/09/22 1035          Balance    Balance Assessment sitting static balance; sitting dynamic balance; standing static balance; standing dynamic balance  -SR     Static Sitting Balance WFL; sitting, edge of bed  -SR     Dynamic Sitting Balance WFL; sitting, edge of bed  -SR     Static Standing Balance mild impairment; supported  -SR     Dynamic Standing Balance mild impairment; supported  -SR     Comment, Balance CGA with RW for standing balance  -SR     [facility] Name 01/09/22 1035          Sensory Assessment (Somatosensory)    Sensory Assessment (Somatosensory) sensation intact  -SR           User Key  (r) = Recorded By, (t) = Taken By, (c) = Cosigned By    Initials Name Provider Type    Christi Lyles Physical Therapist               Goals/Plan     Row Name 01/09/22 1038          Bed Mobility Goal 1 (PT)    Activity/Assistive Device (Bed Mobility Goal 1, PT) bed mobility activities, all  -SR     [place] Level/Cues Needed (Bed Mobility Goal 1, PT) standby assist  -SR     Time Frame (Bed Mobility Goal 1, PT) 1 week  -SR     [facility] Name 01/09/22 1038          Transfer Goal 1 (PT)    Activity/Assistive Device (Transfer Goal 1, PT) transfers, all  -SR     [place] Level/Cues Needed (Transfer Goal 1, PT) standby assist  -SR     Time Frame (Transfer Goal 1, PT) 1 week  -SR      Row Name 01/09/22 1038          Gait Training Goal 1 (PT)    Activity/Assistive Device (Gait Training Goal 1, PT) gait (walking locomotion)  -SR     [place] Level (Gait Training Goal 1, PT) standby assist  -SR     Distance (Gait Training Goal 1, PT) 80ft  -SR     Time Frame (Gait Training Goal 1, PT) 1 week  -SR           User Key  (r) = Recorded By, (t) = Taken By, (c) = Cosigned By    Initials Name Provider Type    SR Christi Au Physical Therapist               Clinical Impression     Row Name 01/09/22 1035          Pain    Additional Documentation Pain Scale: Numbers Pre/Post-Treatment (Group)  -SR     Row Name 01/09/22 1035          Pain Scale: Numbers Pre/Post-Treatment    Pretreatment Pain Rating 0/10 - no pain  -SR     Posttreatment Pain Rating 0/10 - no pain  -SR     Row Name 01/09/22 1035          Plan of Care Review    Plan of Care Reviewed With patient  -SR     Progress no change  -SR     Outcome Summary Pt is a 89 yo female admitted 1/8/22 with pneumonia of R lung diagnosed with COVID-19. PMH includes but not limited to: HTN, CA, HLD. Pt is A&Ox4 and reports she lives with spouse in house with 1 KARL, was using a RW and was independent with mobility prior to admission. Pt on RA with O2 sat >90% throughout session. Pt currently transfers and ambulates 8ft with RW with assist x 1 demonstrating generalized weakness, impaired balance, and decreased activity tolerance. Pt could benefit from skilled PT to address deficits. PT recommends  PT to facilitate progress  -SR     Row Name 01/09/22 1030          Therapy Assessment/Plan (PT)    Patient/Family Therapy Goals Statement (PT) go home  -SR     Rehab Potential (PT) good, to achieve stated therapy goals  -SR     Criteria for Skilled Interventions Met (PT) yes; meets criteria  -SR     Predicted Duration of Therapy Intervention (PT) 1 week  -SR     Row Name 01/09/22 1035          Positioning and Restraints    Pre-Treatment Position in bed  -SR     Post  Treatment Position chair  -SR     In Chair reclined; call light within reach; encouraged to call for assist; exit alarm on; notified nsg  -SR           User Key  (r) = Recorded By, (t) = Taken By, (c) = Cosigned By    Initials Name Provider Type    SR Christi Au Physical Therapist               Outcome Measures     Row Name 01/09/22 1038          How much help from another person do you currently need...    Turning from your back to your side while in flat bed without using bedrails? 3  -SR     Moving from lying on back to sitting on the side of a flat bed without bedrails? 3  -SR     Moving to and from a bed to a chair (including a wheelchair)? 3  -SR     Standing up from a chair using your arms (e.g., wheelchair, bedside chair)? 3  -SR     Climbing 3-5 steps with a railing? 3  -SR     To walk in hospital room? 3  -SR     AM-PAC 6 Clicks Score (PT) 18  -SR     Row Name 01/09/22 1038          Functional Assessment    Outcome Measure Options AM-PAC 6 Clicks Basic Mobility (PT)  -SR           User Key  (r) = Recorded By, (t) = Taken By, (c) = Cosigned By    Initials Name Provider Type    SR Christi Au Physical Therapist                             Physical Therapy Education                 Title: PT OT SLP Therapies (Done)     Topic: Physical Therapy (Done)     Point: Mobility training (Done)     Learning Progress Summary           Patient Acceptance, E, VU by  at 1/9/2022 1039                   Point: Home exercise program (Done)     Learning Progress Summary           Patient Acceptance, E, VU by SR at 1/9/2022 1039                   Point: Body mechanics (Done)     Learning Progress Summary           Patient Acceptance, E, VU by  at 1/9/2022 1039                   Point: Precautions (Done)     Learning Progress Summary           Patient Acceptance, E, VU by  at 1/9/2022 1039                               User Key     Initials Effective Dates Name Provider Type [facility] 02/08/21 -  Christi Au  Physical Therapist PT              PT Recommendation and Plan  Planned Therapy Interventions (PT): balance training, bed mobility training, gait training, home exercise program, manual therapy techniques, neuromuscular re-education, patient/family education, postural re-education, ROM (range of motion), strengthening, stretching, transfer training  Plan of Care Reviewed With: patient  Progress: no change  Outcome Summary: Pt is a 89 yo female admitted 1/8/22 with pneumonia of R lung diagnosed with COVID-19. PMH includes but not limited to: HTN, CA, HLD. Pt is A&Ox4 and reports she lives with spouse in house with 1 KARL, was using a RW and was independent with mobility prior to admission. Pt on RA with O2 sat >90% throughout session. Pt currently transfers and ambulates 8ft with RW with assist x 1 demonstrating generalized weakness, impaired balance, and decreased activity tolerance. Pt could benefit from skilled PT to address deficits. PT recommends  PT to facilitate progress     Time Calculation:    PT Charges     Row Name 01/09/22 1039             Time Calculation    Start Time 0955  -SR      Stop Time 1027  -SR      Time Calculation (min) 32 min  -SR      PT Received On 01/09/22  -SR      PT - Next Appointment 01/10/22  -SR      PT Goal Re-Cert Due Date 01/16/22  -              Time Calculation- PT    Total Timed Code Minutes- PT 32 minute(s)  -SR            User Key  (r) = Recorded By, (t) = Taken By, (c) = Cosigned By    Initials Name Provider Type    SR Christi Au Physical Therapist              Therapy Charges for Today     Code Description Service Date Service Provider Modifiers Qty    49802945709 HC PT EVAL LOW COMPLEXITY 2 1/9/2022 Christi Au GP 1    32438189527 HC PT THERAPEUTIC ACT EA 15 MIN 1/9/2022 Christi Au GP 1    03525336267 HC PT THER PROC EA 15 MIN 1/9/2022 Christi Au GP 1          PT G-Codes  Outcome Measure Options: AM-PAC 6 Clicks Basic Mobility (PT)  AM-PAC 6 Clicks Score (PT):  18    Christi Au  1/9/2022

## 2022-01-09 NOTE — CONSULTS
"[facility]  Initial Inpatient Consult Note    Referring Provider: LHA    Reason for Consultation: abdominal pain, heme positive stool    Subjective     History of present illness:      Thank you for requesting my opinion.    This is a 89-year-old woman with a history of IBS-D, chronic issues with GERD and abdominal pain admitted through the ER with abdominal pain and recent COVID 19 infection.  GI is consulted for abdominal pain and heme positive stool.    She says that she is having symptoms of left-sided abdominal pain.  It is in her usual pain location.  It is not all the time.  She said it is worse with coughing.  She is also having some soft stools, she had 2 yesterday and 1 overnight per her nurse.  Per nurse it is brown in color.  She says her symptoms seem to have flared up with her COVID infection.  She says she uses the Anusol suppositories every day at home but does not want to take them here because she is worried about the making her have more bowel movements though she did take 1 last night.  She is requesting something to firm up her bowel movements.    She continues to have a cough productive of sputum and has numerous tissues that she is using to cough into.    Labs are notable for a very mild normocytic anemia with an elevated ferritin, normal lipase elevated C-reactive protein,  CT imaging shows right lower lobe pneumonia.  Stable intrahepatic biliary rotation in the setting of cholecystectomy, no evidence of bowel obstruction.    She is followed by Dr. Fernández in outpatient setting for issues with abdominal pain, GERD, IBS with both constipation and diarrhea.     She has had both upper and lower abdominal pain, both constipation issues and diarrhea.  She has taken Pepcid with relief.  She has used Anusol suppositories for some \"anal burning\" with relief.    Stool was found to be heme positive.  She does have a history of radiation therapy for cervical cancer in the " past.       Past Medical History:  Past Medical History:   Diagnosis Date   • Arthritis    • Cancer (HCC)     cervical CA 2018 finished radiation   • Elevated cholesterol    • Enteritis    • GERD (gastroesophageal reflux disease)    • Hypertension        Past Surgical History:  Past Surgical History:   Procedure Laterality Date   • CHOLECYSTECTOMY     • COLONOSCOPY     • ENDOSCOPY     • FRACTURE SURGERY      feb 23 2021        Social History:   Social History     Tobacco Use   • Smoking status: Never Smoker   • Smokeless tobacco: Never Used   Substance Use Topics   • Alcohol use: Not Currently        Family History:  History reviewed. No pertinent family history.    Home Meds:  Medications Prior to Admission   Medication Sig Dispense Refill Last Dose   • acetaminophen (TYLENOL) 500 MG tablet Take 1,000 mg by mouth Every 6 (Six) Hours As Needed for Mild Pain .   1/7/2022 at Unknown time   • amLODIPine (NORVASC) 5 MG tablet Take 5 mg by mouth Daily.   1/7/2022 at Unknown time   • aspirin 81 MG EC tablet Take 81 mg by mouth Daily.   1/7/2022 at Unknown time   • atorvastatin (LIPITOR) 40 MG tablet Take 40 mg by mouth Daily.   1/7/2022 at Unknown time   • carvedilol (COREG) 25 MG tablet Take 1 tablet by mouth 2 (Two) Times a Day With Meals for 30 days. 60 tablet 0 1/7/2022 at Unknown time   • DULoxetine (CYMBALTA) 20 MG capsule Take 30 mg by mouth Daily. Taken at evening    1/7/2022 at Unknown time   • folic acid (FOLVITE) 1 MG tablet Take 1 mg by mouth Daily.   1/7/2022 at Unknown time   • multivitamin with minerals (MULTIVITAMIN ADULT PO) Take 1 tablet by mouth Daily.   1/7/2022 at Unknown time   • pantoprazole (PROTONIX) 40 MG EC tablet Take 40 mg by mouth Daily.   1/7/2022 at Unknown time   • traMADol (ULTRAM) 50 MG tablet Take 50 mg by mouth Every 8 (Eight) Hours As Needed for Moderate Pain .   1/7/2022 at Unknown time   • vitamin B-12 (CYANOCOBALAMIN) 1000 MCG tablet Take 1,000 mcg by mouth Daily.   1/7/2022 at  Unknown time   • estradiol (VAGIFEM) 10 MCG tablet vaginal tablet Insert 1 tablet into the vagina 2 (Two) Times a Week.      • hydrocortisone (ANUSOL-HC) 25 MG suppository Insert 1 suppository into the rectum 2 (Two) Times a Day. 60 suppository 1    • latanoprost (XALATAN) 0.005 % ophthalmic solution 1 drop Every Night.      • Melatonin 1 MG/4ML liquid 5 mg.      • timolol (TIMOPTIC) 0.25 % ophthalmic solution 1 drop 2 (Two) Times a Day.      • traZODone (DESYREL) 50 MG tablet Take 50 mg by mouth Every Night.          Current Meds:   amLODIPine, 5 mg, Oral, Daily  aspirin, 81 mg, Oral, Daily  atorvastatin, 40 mg, Oral, Nightly  azithromycin, 500 mg, Intravenous, Q24H  budesonide-formoterol, 2 puff, Inhalation, BID - RT  carvedilol, 25 mg, Oral, BID With Meals  cefTRIAXone, 1 g, Intravenous, Q24H  dexamethasone, 6 mg, Oral, Daily With Breakfast  DULoxetine, 30 mg, Oral, Daily  enoxaparin, 40 mg, Subcutaneous, Q24H  folic acid, 1 mg, Oral, Daily  guaiFENesin, 600 mg, Oral, Q12H  hydrocortisone, 25 mg, Rectal, BID  latanoprost, 1 drop, Both Eyes, Nightly  pantoprazole, 40 mg, Intravenous, Q AM  sodium chloride, 10 mL, Intravenous, Q12H  timolol, 1 drop, Both Eyes, Daily  traZODone, 50 mg, Oral, Nightly        Allergies:  Allergies   Allergen Reactions   • Bactrim [Sulfamethoxazole-Trimethoprim] GI Intolerance   • Doxycycline GI Intolerance   • Fosamax [Alendronate] GI Intolerance   • Lactose Intolerance (Gi) GI Intolerance   • Macrodantin [Nitrofurantoin] GI Intolerance   • Naproxen GI Intolerance   • Zestril [Lisinopril] GI Intolerance       Review of Systems  All systems were reviewed and negative except for:  Constitution:  positive for fatigue  Respiratory: positive for  cough, productive  Gastrointestinal: positive for  pain and soft stool     Objective     Vital Signs  Temp:  [97.3 °F (36.3 °C)-98.5 °F (36.9 °C)] 98.5 °F (36.9 °C)  Heart Rate:  [] 101  Resp:  [16-18] 16  BP: (133-160)/(80-97)  133/85    Physical Exam:  Constitutional:   Alert, cooperative, in no acute distress, appears stated age, elderly and frail   Eyes:           Lids and lashes normal, conjunctivae and sclerae normal,   no icterus   Ears, nose, mouth and throat:  Normal appearance of external ears and nose, no oral l  lesions, no thrush, oral mucosa moist   Respiratory:    Clear to auscultation, respirations regular, even and             unlabored    Cardiovascular:   Regular rhythm and normal rate, normal S1 and S2, no        murmur, no gallop, palpable distal pulses, no lower extremity edema   Gastrointestinal:    Soft, nondistended, nontender to palpation, no guarding, no rebound tenderness, normal bowel sounds, no palpable masses or organomegaly  Rectal exam: deferred   Musculoskeletal:  Normal station, no atrophy, no tenderness to palpation, normal digits and nails   Skin:  Normal color, no bleeding, bruising, rashes or lesions   Lymphatics:  No palpable cervical or supraclavicular adenopathy   Psychiatric:  Judgement and insight: normal   Orientation to person, place and time: normal   Mood and affect: normal       Results Review:   I reviewed the patient's new clinical results.    Results from last 7 days   Lab Units 01/09/22  1233 01/08/22  1127 01/08/22  0037   WBC 10*3/mm3 23.03* 24.87* 25.49*   HEMOGLOBIN g/dL 11.2* 11.3* 11.8*   HEMATOCRIT % 34.0 32.4* 34.2   PLATELETS 10*3/mm3 394 420 362       Results from last 7 days   Lab Units 01/08/22  1635 01/08/22  1127 01/08/22  0037   SODIUM mmol/L 135* 135* 130*   POTASSIUM mmol/L 4.9 3.8 3.4*   CHLORIDE mmol/L 97* 97* 91*   CO2 mmol/L 24.8 28.3 29.4*   BUN mg/dL 9 7* 13   CREATININE mg/dL 0.44* 0.35* 0.40*   CALCIUM mg/dL 8.5 8.6 8.8   BILIRUBIN mg/dL 0.2  --  0.3   ALK PHOS U/L 82  --  73   ALT (SGPT) U/L 15  --  16   AST (SGOT) U/L 17  --  22   GLUCOSE mg/dL 112* 145* 125*             Lab Results   Lab Value Date/Time    LIPASE 45 01/08/2022 1635    LIPASE 58 08/12/2021 1011     LIPASE 53 05/15/2021 0639    LIPASE 41 04/20/2021 1158    LIPASE 194 06/09/2020 1618    LIPASE 774 (H) 12/30/2019 1226    LIPASE 5,299 (H) 12/16/2019 1609    LIPASE 246 07/11/2019 2158    LIPASE 213 05/17/2018 1306       Radiology:  Imaging Results (Last 72 Hours)     Procedure Component Value Units Date/Time    CT Abdomen Pelvis With Contrast [[id number]] Collected: 01/08/22 0255     Updated: 01/08/22 0310    Narrative:      CT OF THE ABDOMEN AND PELVIS WITH CONTRAST     HISTORY: Abdominal pain, leukocytosis, and COVID.     COMPARISON: 08/20/2021     TECHNIQUE: Axial CT imaging was obtained through the abdomen and pelvis.  IV contrast was administered.     FINDINGS:  Images through the lung bases demonstrate areas of scarring. More focal  consolidation is noted within the right lower lobe. This area measures  up to 3.4 x 1.5 cm. It may represent pneumonia, but short-term CT  follow-up in 3 months is recommended to document resolution. The stomach  and duodenum appear unremarkable, as are the adrenal glands. Pancreas is  within normal limits for a 89-year-old patient. Calcified granulomata  are seen within the spleen. There is persistent intra and extrahepatic  biliary dilatation. Common bile duct measures up to 9 mm. This is stable  when compared to prior study. Patient did have pneumobilia the prior  exam, which is no longer seen. The degree of intrahepatic biliary  dilatation appears stable as well. Gallbladder is surgically absent. The  kidneys enhance symmetrically. Areas of cortical thinning are noted on  the left kidney, suggesting the sequela prior insults. There are  bilateral renal cysts. There is calcification of the aorta. Urinary  bladder is distended, and correlation with any symptoms of urinary  retention is recommended. Trace amount of free fluid is noted within the  pelvis. There is no evidence of bowel obstruction. There is no evidence  of appendicitis. There is lumbar scoliosis, with convexity  to the left.  The patient is osteoporotic. Compression deformity of L5 is stable, as  is an old sacral fracture. 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       Impression:         1. There is an area of airspace consolidation within the right lower  lobe. This could reflect pneumonia, but short-term CT follow-up to  document resolution is recommended.  2. Stable intra and extrahepatic biliary dilatation, although  pneumobilia is no longer seen.  3. Marked distention of the urinary bladder. Correlation with any  evidence of urinary retention is recommended.     Radiation dose reduction techniques were utilized, including automated  exposure control and exposure modulation based on body size.     This report was finalized on 1/8/2022 3:07 AM by Dr. Marixa Pat M.D.       XR Chest 1 View [[id number]] Collected: 01/08/22 0053     Updated: 01/08/22 0059    Narrative:      SINGLE VIEW OF THE CHEST     HISTORY: Cough. COVID diagnosis.     COMPARISON: None available.     FINDINGS:  Cardiomegaly is present. There is no vascular congestion. There is some  consolidation noted within the right midlung. This may reflect an area  of chronic scarring with associated bronchiectasis, although infiltrate  is not excluded. No pneumothorax is seen. No pleural effusion is  identified. No definite infiltrates are seen on the left.       Impression:      Bandlike area of consolidation noted within the right midlung.  Appearance is more suggestive of parenchymal scarring, although  infiltrate is not completely excluded. Short-term follow-up exam is  recommended.     This report was finalized on 1/8/2022 12:56 AM by Dr. Marixa Pat M.D.             Assessment/Plan       Pneumonia of right lung due to infectious organism    Essential hypertension    GERD without esophagitis    History of cervical cancer    HLD (hyperlipidemia)    Hypokalemia    Hypomagnesemia    Occult GI bleeding      Impression  1.  Acute on  chronic abdominal pain: Various locations.  I suspect these are her chronic issues flared up by her COVID infection.  No new or significant gastrointestinal findings on her 1/8/2022 CT scan    2.  Chronic IBS with both diarrhea and constipation    3.  Right-sided pneumonia with history of COVID-19    4.  Heme positive stool: Her hemoglobin is quite stable and iron studies suggestive of chronic disease picture.  She also has a history of radiation therapy to the cervix which could certainly lead to issues with radiation proctitis    Plan  Continue supportive care for COVID-19 infection/pneumonia  Continue PPI  Continue as needed Anusol suppositories  Add in twice daily calcium polycarbophil for stool bulking  Follow hemoglobin and monitor stools.  There is no indication for endoscopy.  This could be addressed in the outpatient setting with Dr. Fernández      I discussed the patients findings and my recommendations with patient and nursing staff    All necessary PPE, including face mask and eye protection, were worn during this encounter.  Hand sanitization was performed both before and after the patient interaction.    Christi Benjamin MD  [facility]      Dictated utilizing Dragon dictation

## 2022-01-09 NOTE — PLAN OF CARE
Goal Outcome Evaluation:  Plan of Care Reviewed With: patient        Progress: improving  Outcome Summary: VSS on room air. Denies SOB. No complaints of pain. GI consulted. PT worked with patient. sit up in chair for couple hours.D/c'd IVF today.urine and sputum sample collected.will continue to monitor.

## 2022-01-10 ENCOUNTER — APPOINTMENT (OUTPATIENT)
Dept: CARDIOLOGY | Facility: HOSPITAL | Age: 87
End: 2022-01-10

## 2022-01-10 PROBLEM — J15.212 PNEUMONIA OF RIGHT LUNG DUE TO METHICILLIN RESISTANT STAPHYLOCOCCUS AUREUS (MRSA) (HCC): Status: ACTIVE | Noted: 2022-01-10

## 2022-01-10 PROBLEM — J15.212 PNEUMONIA OF RIGHT LOWER LOBE DUE TO METHICILLIN RESISTANT STAPHYLOCOCCUS AUREUS (MRSA): Status: ACTIVE | Noted: 2022-01-10

## 2022-01-10 LAB
ALBUMIN SERPL-MCNC: 3.3 G/DL (ref 3.5–5.2)
ALBUMIN/GLOB SERPL: 1.3 G/DL
ALP SERPL-CCNC: 68 U/L (ref 39–117)
ALT SERPL W P-5'-P-CCNC: 13 U/L (ref 1–33)
ANION GAP SERPL CALCULATED.3IONS-SCNC: 16.7 MMOL/L (ref 5–15)
AORTIC DIMENSIONLESS INDEX: 0.5 (DI)
AST SERPL-CCNC: 17 U/L (ref 1–32)
BACTERIA BLD CULT: ABNORMAL
BASOPHILS # BLD AUTO: 0.01 10*3/MM3 (ref 0–0.2)
BASOPHILS NFR BLD AUTO: 0.1 % (ref 0–1.5)
BH CV ECHO MEAS - ACS: 1.6 CM
BH CV ECHO MEAS - AO MAX PG (FULL): 4 MMHG
BH CV ECHO MEAS - AO MAX PG: 6.3 MMHG
BH CV ECHO MEAS - AO MEAN PG (FULL): 2.3 MMHG
BH CV ECHO MEAS - AO MEAN PG: 3.4 MMHG
BH CV ECHO MEAS - AO V2 MAX: 125.1 CM/SEC
BH CV ECHO MEAS - AO V2 MEAN: 86.1 CM/SEC
BH CV ECHO MEAS - AO V2 VTI: 18.6 CM
BH CV ECHO MEAS - AVA(I,A): 0.97 CM^2
BH CV ECHO MEAS - AVA(I,D): 0.97 CM^2
BH CV ECHO MEAS - AVA(V,A): 1.2 CM^2
BH CV ECHO MEAS - AVA(V,D): 1.2 CM^2
BH CV ECHO MEAS - BSA(HAYCOCK): 1.2 M^2
BH CV ECHO MEAS - BSA: 1.3 M^2
BH CV ECHO MEAS - BZI_BMI: 16 KILOGRAMS/M^2
BH CV ECHO MEAS - BZI_METRIC_HEIGHT: 152.4 CM
BH CV ECHO MEAS - BZI_METRIC_WEIGHT: 37.2 KG
BH CV ECHO MEAS - CONTRAST EF 4CH: 40 CM2
BH CV ECHO MEAS - EDV(CUBED): 52.4 ML
BH CV ECHO MEAS - EDV(MOD-SP2): 59 ML
BH CV ECHO MEAS - EDV(MOD-SP4): 58 ML
BH CV ECHO MEAS - EDV(TEICH): 59.7 ML
BH CV ECHO MEAS - EF(CUBED): 64.3 %
BH CV ECHO MEAS - EF(MOD-BP): 40 %
BH CV ECHO MEAS - EF(MOD-SP2): 44.1 %
BH CV ECHO MEAS - EF(MOD-SP4): 34.5 %
BH CV ECHO MEAS - EF(TEICH): 56.6 %
BH CV ECHO MEAS - ESV(CUBED): 18.7 ML
BH CV ECHO MEAS - ESV(MOD-SP2): 33 ML
BH CV ECHO MEAS - ESV(MOD-SP4): 38 ML
BH CV ECHO MEAS - ESV(TEICH): 25.9 ML
BH CV ECHO MEAS - FS: 29 %
BH CV ECHO MEAS - IVS/LVPW: 0.99
BH CV ECHO MEAS - IVSD: 1.3 CM
BH CV ECHO MEAS - LAT PEAK E' VEL: 6.3 CM/SEC
BH CV ECHO MEAS - LV DIASTOLIC VOL/BSA (35-75): 45.4 ML/M^2
BH CV ECHO MEAS - LV MASS(C)D: 170.8 GRAMS
BH CV ECHO MEAS - LV MASS(C)DI: 133.7 GRAMS/M^2
BH CV ECHO MEAS - LV MAX PG: 2.3 MMHG
BH CV ECHO MEAS - LV MEAN PG: 1.1 MMHG
BH CV ECHO MEAS - LV SYSTOLIC VOL/BSA (12-30): 29.7 ML/M^2
BH CV ECHO MEAS - LV V1 MAX: 75.8 CM/SEC
BH CV ECHO MEAS - LV V1 MEAN: 47.6 CM/SEC
BH CV ECHO MEAS - LV V1 VTI: 9.2 CM
BH CV ECHO MEAS - LVIDD: 3.7 CM
BH CV ECHO MEAS - LVIDS: 2.7 CM
BH CV ECHO MEAS - LVLD AP2: 5.7 CM
BH CV ECHO MEAS - LVLD AP4: 5.8 CM
BH CV ECHO MEAS - LVLS AP2: 5.5 CM
BH CV ECHO MEAS - LVLS AP4: 5.4 CM
BH CV ECHO MEAS - LVOT AREA (M): 2 CM^2
BH CV ECHO MEAS - LVOT AREA: 2 CM^2
BH CV ECHO MEAS - LVOT DIAM: 1.6 CM
BH CV ECHO MEAS - LVPWD: 1.3 CM
BH CV ECHO MEAS - MED PEAK E' VEL: 7.1 CM/SEC
BH CV ECHO MEAS - MV A DUR: 0.13 SEC
BH CV ECHO MEAS - MV A MAX VEL: 57.8 CM/SEC
BH CV ECHO MEAS - MV DEC SLOPE: 247 CM/SEC^2
BH CV ECHO MEAS - MV DEC TIME: 260 SEC
BH CV ECHO MEAS - MV E MAX VEL: 68.1 CM/SEC
BH CV ECHO MEAS - MV E/A: 1.2
BH CV ECHO MEAS - MV MAX PG: 3 MMHG
BH CV ECHO MEAS - MV MEAN PG: 1.8 MMHG
BH CV ECHO MEAS - MV P1/2T MAX VEL: 61.1 CM/SEC
BH CV ECHO MEAS - MV P1/2T: 72.4 MSEC
BH CV ECHO MEAS - MV V2 MAX: 85.5 CM/SEC
BH CV ECHO MEAS - MV V2 MEAN: 62.7 CM/SEC
BH CV ECHO MEAS - MV V2 VTI: 17.5 CM
BH CV ECHO MEAS - MVA P1/2T LCG: 3.6 CM^2
BH CV ECHO MEAS - MVA(P1/2T): 3 CM^2
BH CV ECHO MEAS - MVA(VTI): 1 CM^2
BH CV ECHO MEAS - PULM A REVS DUR: 0.13 SEC
BH CV ECHO MEAS - PULM A REVS VEL: 47.5 CM/SEC
BH CV ECHO MEAS - PULM DIAS VEL: 29 CM/SEC
BH CV ECHO MEAS - PULM S/D: 2.3
BH CV ECHO MEAS - PULM SYS VEL: 67.2 CM/SEC
BH CV ECHO MEAS - RAP SYSTOLE: 3 MMHG
BH CV ECHO MEAS - RVSP: 22.6 MMHG
BH CV ECHO MEAS - SI(CUBED): 26.3 ML/M^2
BH CV ECHO MEAS - SI(LVOT): 14.1 ML/M^2
BH CV ECHO MEAS - SI(MOD-SP2): 20.3 ML/M^2
BH CV ECHO MEAS - SI(MOD-SP4): 15.7 ML/M^2
BH CV ECHO MEAS - SI(TEICH): 26.4 ML/M^2
BH CV ECHO MEAS - SV(CUBED): 33.7 ML
BH CV ECHO MEAS - SV(LVOT): 18 ML
BH CV ECHO MEAS - SV(MOD-SP2): 26 ML
BH CV ECHO MEAS - SV(MOD-SP4): 20 ML
BH CV ECHO MEAS - SV(TEICH): 33.8 ML
BH CV ECHO MEAS - TAPSE (>1.6): 1.9 CM
BH CV ECHO MEAS - TR MAX VEL: 221.3 CM/SEC
BH CV ECHO MEASUREMENTS AVERAGE E/E' RATIO: 10.16
BH CV VAS BP RIGHT ARM: NORMAL MMHG
BH CV XLRA - RV BASE: 2.5 CM
BH CV XLRA - RV LENGTH: 6.7 CM
BH CV XLRA - RV MID: 2 CM
BH CV XLRA - TDI S': 12.3 CM/SEC
BILIRUB SERPL-MCNC: 0.3 MG/DL (ref 0–1.2)
BUN SERPL-MCNC: 10 MG/DL (ref 8–23)
BUN/CREAT SERPL: 26.3 (ref 7–25)
CALCIUM SPEC-SCNC: 8.9 MG/DL (ref 8.2–9.6)
CHLORIDE SERPL-SCNC: 94 MMOL/L (ref 98–107)
CO2 SERPL-SCNC: 22.3 MMOL/L (ref 22–29)
CREAT SERPL-MCNC: 0.38 MG/DL (ref 0.57–1)
CRP SERPL-MCNC: 2.81 MG/DL (ref 0–0.5)
DEPRECATED RDW RBC AUTO: 46.5 FL (ref 37–54)
EOSINOPHIL # BLD AUTO: 0 10*3/MM3 (ref 0–0.4)
EOSINOPHIL NFR BLD AUTO: 0 % (ref 0.3–6.2)
ERYTHROCYTE [DISTWIDTH] IN BLOOD BY AUTOMATED COUNT: 13.1 % (ref 12.3–15.4)
FERRITIN SERPL-MCNC: 611 NG/ML (ref 13–150)
GFR SERPL CREATININE-BSD FRML MDRD: >150 ML/MIN/1.73
GLOBULIN UR ELPH-MCNC: 2.5 GM/DL
GLUCOSE SERPL-MCNC: 95 MG/DL (ref 65–99)
HCT VFR BLD AUTO: 32.5 % (ref 34–46.6)
HGB BLD-MCNC: 11 G/DL (ref 12–15.9)
IMM GRANULOCYTES # BLD AUTO: 0.15 10*3/MM3 (ref 0–0.05)
IMM GRANULOCYTES NFR BLD AUTO: 1.3 % (ref 0–0.5)
LEFT ATRIUM VOLUME INDEX: 23 ML/M2
LYMPHOCYTES # BLD AUTO: 0.35 10*3/MM3 (ref 0.7–3.1)
LYMPHOCYTES NFR BLD AUTO: 3 % (ref 19.6–45.3)
MAGNESIUM SERPL-MCNC: 1.9 MG/DL (ref 1.7–2.3)
MAXIMAL PREDICTED HEART RATE: 129 BPM
MCH RBC QN AUTO: 32.8 PG (ref 26.6–33)
MCHC RBC AUTO-ENTMCNC: 33.8 G/DL (ref 31.5–35.7)
MCV RBC AUTO: 97 FL (ref 79–97)
MONOCYTES # BLD AUTO: 0.56 10*3/MM3 (ref 0.1–0.9)
MONOCYTES NFR BLD AUTO: 4.8 % (ref 5–12)
NEUTROPHILS NFR BLD AUTO: 10.69 10*3/MM3 (ref 1.7–7)
NEUTROPHILS NFR BLD AUTO: 90.8 % (ref 42.7–76)
NRBC BLD AUTO-RTO: 0 /100 WBC (ref 0–0.2)
PLATELET # BLD AUTO: 389 10*3/MM3 (ref 140–450)
PMV BLD AUTO: 8.6 FL (ref 6–12)
POTASSIUM SERPL-SCNC: 4.4 MMOL/L (ref 3.5–5.2)
PROCALCITONIN SERPL-MCNC: 0.05 NG/ML (ref 0–0.25)
PROT SERPL-MCNC: 5.8 G/DL (ref 6–8.5)
RBC # BLD AUTO: 3.35 10*6/MM3 (ref 3.77–5.28)
SODIUM SERPL-SCNC: 133 MMOL/L (ref 136–145)
STRESS TARGET HR: 110 BPM
VANCOMYCIN SERPL-MCNC: 7.7 MCG/ML (ref 5–40)
WBC NRBC COR # BLD: 11.76 10*3/MM3 (ref 3.4–10.8)

## 2022-01-10 PROCEDURE — 94761 N-INVAS EAR/PLS OXIMETRY MLT: CPT

## 2022-01-10 PROCEDURE — 94799 UNLISTED PULMONARY SVC/PX: CPT

## 2022-01-10 PROCEDURE — 93356 MYOCRD STRAIN IMG SPCKL TRCK: CPT

## 2022-01-10 PROCEDURE — 25010000002 CEFTRIAXONE PER 250 MG: Performed by: NURSE PRACTITIONER

## 2022-01-10 PROCEDURE — 25010000002 PERFLUTREN (DEFINITY) 8.476 MG IN SODIUM CHLORIDE (PF) 0.9 % 10 ML INJECTION: Performed by: INTERNAL MEDICINE

## 2022-01-10 PROCEDURE — 80202 ASSAY OF VANCOMYCIN: CPT | Performed by: NURSE PRACTITIONER

## 2022-01-10 PROCEDURE — 83735 ASSAY OF MAGNESIUM: CPT | Performed by: INTERNAL MEDICINE

## 2022-01-10 PROCEDURE — 80053 COMPREHEN METABOLIC PANEL: CPT | Performed by: INTERNAL MEDICINE

## 2022-01-10 PROCEDURE — 85025 COMPLETE CBC W/AUTO DIFF WBC: CPT | Performed by: INTERNAL MEDICINE

## 2022-01-10 PROCEDURE — 99232 SBSQ HOSP IP/OBS MODERATE 35: CPT | Performed by: INTERNAL MEDICINE

## 2022-01-10 PROCEDURE — 25010000002 ENOXAPARIN PER 10 MG: Performed by: INTERNAL MEDICINE

## 2022-01-10 PROCEDURE — 99223 1ST HOSP IP/OBS HIGH 75: CPT | Performed by: INTERNAL MEDICINE

## 2022-01-10 PROCEDURE — 93306 TTE W/DOPPLER COMPLETE: CPT

## 2022-01-10 PROCEDURE — 93356 MYOCRD STRAIN IMG SPCKL TRCK: CPT | Performed by: INTERNAL MEDICINE

## 2022-01-10 PROCEDURE — 84145 PROCALCITONIN (PCT): CPT | Performed by: NURSE PRACTITIONER

## 2022-01-10 PROCEDURE — 86140 C-REACTIVE PROTEIN: CPT | Performed by: INTERNAL MEDICINE

## 2022-01-10 PROCEDURE — 82728 ASSAY OF FERRITIN: CPT | Performed by: INTERNAL MEDICINE

## 2022-01-10 PROCEDURE — 25010000002 VANCOMYCIN PER 500 MG: Performed by: NURSE PRACTITIONER

## 2022-01-10 PROCEDURE — 93306 TTE W/DOPPLER COMPLETE: CPT | Performed by: INTERNAL MEDICINE

## 2022-01-10 PROCEDURE — 94760 N-INVAS EAR/PLS OXIMETRY 1: CPT

## 2022-01-10 RX ORDER — PANTOPRAZOLE SODIUM 40 MG/1
40 TABLET, DELAYED RELEASE ORAL DAILY
Status: DISCONTINUED | OUTPATIENT
Start: 2022-01-11 | End: 2022-01-13 | Stop reason: HOSPADM

## 2022-01-10 RX ORDER — VANCOMYCIN HYDROCHLORIDE 1 G/200ML
1000 INJECTION, SOLUTION INTRAVENOUS ONCE
Status: COMPLETED | OUTPATIENT
Start: 2022-01-10 | End: 2022-01-10

## 2022-01-10 RX ADMIN — LATANOPROST 1 DROP: 50 SOLUTION OPHTHALMIC at 20:03

## 2022-01-10 RX ADMIN — CEFTRIAXONE 1 G: 1 INJECTION, POWDER, FOR SOLUTION INTRAMUSCULAR; INTRAVENOUS at 02:29

## 2022-01-10 RX ADMIN — ACETAMINOPHEN 650 MG: 325 TABLET, FILM COATED ORAL at 06:51

## 2022-01-10 RX ADMIN — ATORVASTATIN CALCIUM 40 MG: 20 TABLET, FILM COATED ORAL at 20:03

## 2022-01-10 RX ADMIN — CALCIUM POLYCARBOPHIL 625 MG: 625 TABLET, FILM COATED ORAL at 20:03

## 2022-01-10 RX ADMIN — SODIUM CHLORIDE, PRESERVATIVE FREE 10 ML: 5 INJECTION INTRAVENOUS at 09:25

## 2022-01-10 RX ADMIN — HYDROCORTISONE ACETATE 25 MG: 25 SUPPOSITORY RECTAL at 20:01

## 2022-01-10 RX ADMIN — ENOXAPARIN SODIUM 40 MG: 40 INJECTION SUBCUTANEOUS at 09:17

## 2022-01-10 RX ADMIN — ASPIRIN 81 MG: 81 TABLET, COATED ORAL at 09:11

## 2022-01-10 RX ADMIN — TRAZODONE HYDROCHLORIDE 50 MG: 50 TABLET ORAL at 20:03

## 2022-01-10 RX ADMIN — DULOXETINE HYDROCHLORIDE 30 MG: 30 CAPSULE, DELAYED RELEASE ORAL at 09:17

## 2022-01-10 RX ADMIN — VANCOMYCIN HYDROCHLORIDE 1000 MG: 1 INJECTION, SOLUTION INTRAVENOUS at 06:39

## 2022-01-10 RX ADMIN — FOLIC ACID 1 MG: 1 TABLET ORAL at 09:16

## 2022-01-10 RX ADMIN — PANTOPRAZOLE SODIUM 40 MG: 40 INJECTION, POWDER, FOR SOLUTION INTRAVENOUS at 05:48

## 2022-01-10 RX ADMIN — GUAIFENESIN 600 MG: 600 TABLET, EXTENDED RELEASE ORAL at 09:10

## 2022-01-10 RX ADMIN — TIMOLOL MALEATE 1 DROP: 2.5 SOLUTION OPHTHALMIC at 09:25

## 2022-01-10 RX ADMIN — CALCIUM POLYCARBOPHIL 625 MG: 625 TABLET, FILM COATED ORAL at 09:11

## 2022-01-10 RX ADMIN — DEXAMETHASONE 6 MG: 6 TABLET ORAL at 09:17

## 2022-01-10 RX ADMIN — CARVEDILOL 25 MG: 25 TABLET, FILM COATED ORAL at 17:43

## 2022-01-10 RX ADMIN — SODIUM CHLORIDE, PRESERVATIVE FREE 10 ML: 5 INJECTION INTRAVENOUS at 20:03

## 2022-01-10 RX ADMIN — AMLODIPINE BESYLATE 5 MG: 5 TABLET ORAL at 09:11

## 2022-01-10 RX ADMIN — CARVEDILOL 25 MG: 25 TABLET, FILM COATED ORAL at 09:16

## 2022-01-10 RX ADMIN — PERFLUTREN 2 ML: 6.52 INJECTION, SUSPENSION INTRAVENOUS at 14:07

## 2022-01-10 RX ADMIN — BUDESONIDE AND FORMOTEROL FUMARATE DIHYDRATE 2 PUFF: 160; 4.5 AEROSOL RESPIRATORY (INHALATION) at 08:10

## 2022-01-10 NOTE — DISCHARGE PLACEMENT REQUEST
"Bo Walton (89 y.o. Female)             Date of Birth Social Security Number Address Home Phone MRN    10/24/1930  [address] [id number] [phone] [id number]    [employer] Marital Status             Patient Refused        Admission Date Admission Type Admitting Provider Attending Provider Department, Room/Bed    [facility] Benton, John B, MD [address], [address]    Discharge Date Discharge Disposition Discharge Destination                         Attending Provider: Petr Raymundo MD    Allergies: Bactrim [Sulfamethoxazole-trimethoprim], Doxycycline, Fosamax [Alendronate], Lactose Intolerance (Gi), Macrodantin [Nitrofurantoin], Naproxen, Zestril [Lisinopril]    Isolation: Enh Drop/Con, Contact   Infection: COVID (confirmed) (01/08/22), MRSA (01/10/22)   Code Status: CPR   Advance Care Planning Activity    Ht: 152.4 cm (60\")   Wt: 37.2 kg (82 lb)    Admission Cmt: None   Principal Problem: Pneumonia of right lung due to infectious organism [J18.9]                 Active Insurance as of 1/8/2022     Primary Coverage     Payor Plan Insurance Group Employer/Plan Group    ANTHEM MEDICARE REPLACEMENT ANTHEM MEDICARE ADVANTAGE [id number]     Payor Plan Address Payor Plan Phone Number Payor Plan Fax Number Effective Dates    [address] [phone]  1/1/2018 - None Entered    [facility] [id number]       Subscriber Name Subscriber Birth Date Member ID       BO WALTON 10/24/1930 [id number]                 Emergency Contacts      (Rel.) Home Phone Work Phone Mobile Phone    MARLA CALLES (Daughter) -- -- [phone]              "

## 2022-01-10 NOTE — CONSULTS
Referring Provider: Dr. Raymundo    Subjective   History of present illness: Very nice 89-year-old we are asked about evaluation regarding MRSA and COVID.  She tells me that her  was diagnosed with COVID on Paulina Day 2021.  She started feeling ill at the end of Christmas and actually tested negative for Covid.  Unfortunately she developed abdominal pain, sore throat, productive cough and cold chills.  She saw her PCP was noted to have a white count of 30,000 so was instructed to come to the ER.  Here she tested positive for COVID and an admission blood cultures growing MRSA.  She was also found to have possible right lower lobe pneumonia on CT scan  She has been vaccinated for COVID-19 x3 with her last dose occurring in oct 2021  Past Medical History:   Diagnosis Date   • Arthritis    • Cancer (HCC)     cervical CA 2018 finished radiation   • Elevated cholesterol    • Enteritis    • GERD (gastroesophageal reflux disease)    • Hypertension        Past Surgical History:   Procedure Laterality Date   • CHOLECYSTECTOMY     • COLONOSCOPY     • ENDOSCOPY     • FRACTURE SURGERY      feb 23 2021       Family history notable for COVID-19 and her  but no other family history of Covid  Social history: She is  and lives here in [place], [place].  Retired.    Allergies   Allergen Reactions   • Bactrim [Sulfamethoxazole-Trimethoprim] GI Intolerance   • Doxycycline GI Intolerance   • Fosamax [Alendronate] GI Intolerance   • Lactose Intolerance (Gi) GI Intolerance   • Macrodantin [Nitrofurantoin] GI Intolerance   • Naproxen GI Intolerance   • Zestril [Lisinopril] GI Intolerance       Review of Systems  Pertinent items are noted in HPI, all other systems reviewed and negative    Objective     Physical Exam:   Vital Signs   Temp:  [97 °F (36.1 °C)-98.5 °F (36.9 °C)] 97.9 °F (36.6 °C)  Heart Rate:  [] 104  Resp:  [16-18] 16  BP: (133-159)/(84-99) 155/91    GENERAL: Awake and alert, sickly  HEENT:  Oropharynx is clear. Hearing is grossly normal.   EYES: PERRL. No conjunctival injection. No lid lag.   LYMPHATICS: No lymphadenopathy of the neck or inguinal regions.   HEART: Regular rate and regular rhythm. No peripheral edema.   LUNGS: Clear to auscultation anteriorly with normal respiratory effort.   GI: Soft, nontender, nondistended. No appreciable organomegaly.   SKIN: Warm and dry without cutaneous eruptions   PSYCHIATRIC: Appropriate mood, affect, insight, and judgment.     Results Review:       Lab Results   Component Value Date    WBC 11.76 (H) 01/10/2022    HGB 11.0 (L) 01/10/2022    HCT 32.5 (L) 01/10/2022    MCV 97.0 01/10/2022     01/10/2022     Creatinine 0.38  Procalcitonin 0.05  1/8 blood cultures 1/2 MRSA  1/8 blood cultures no growth  CT chest reviewed.  She does have more consolidative infiltrate in the right lower lobe.  No evidence of COVID-19 pneumonia    Assessment/Plan   1.  MRSA pneumonia, likely secondary bacterial infection from #2  2.  COVID-19 infection  3.  Leukocytosis, likely related to above    Suspect post viral MRSA pneumonia.  Interestingly procalcitonin is negative.  Continue intermittent vancomycin dosing.  Checking vancomycin level tonight.   Stop rocephin and steroids  I will check an echo for completeness sake, but doubt endocarditis  Called dtr update per patient request. She tested positive for COVID at [facility] on Jan 4 (see care everywhere) so would use this as first day of ten day isolation.     Thank you for this consult.  We will continue to follow along and tailor antibiotics as the patient's clinical course evolves.      Margarito Coon MD  01/10/22  11:21 EST

## 2022-01-10 NOTE — PROGRESS NOTES
Discharge Planning Assessment  [facility]     Patient Name: Bo Adan  MRN: [id number]  Today's Date: 1/10/2022    Admit Date: 1/8/2022     Discharge Needs Assessment     Row Name 01/10/22 1503       Living Environment    Lives With spouse    Current Living Arrangements home/apartment/condo    Primary Care Provided by self; spouse/significant other; child(nikko)    Provides Primary Care For no one, unable/limited ability to care for self    Family Caregiver if Needed child(nikko), adult; spouse    Family Caregiver Names Spouse Kole, Daughter Mary and Son Pedro    Quality of Family Relationships helpful; involved; supportive    Able to Return to Prior Arrangements yes       Resource/Environmental Concerns    Resource/Environmental Concerns none       Transition Planning    Patient/Family Anticipates Transition to home with family    Patient/Family Anticipated Services at Transition home health care    Transportation Anticipated family or friend will provide       Discharge Needs Assessment    Readmission Within the Last 30 Days no previous admission in last 30 days    Equipment Currently Used at Home grab bar; shower chair; walker, rolling    Concerns to be Addressed adjustment to diagnosis/illness    Equipment Needed After Discharge none    Discharge Facility/Level of Care Needs home with home health    Provided Post Acute Provider List? N/A    N/A Provider List Comment referral to [facility] per pt's request    Provided Post Acute Provider Quality & Resource List? N/A               Discharge Plan     Row Name 01/10/22 1508       Plan    Plan Home w/ family and     Patient/Family in Agreement with Plan yes    Plan Comments Face sheet verified with patient. Patient lives w/ her spouse, Kole in their single story home. She uses a walker, shower chair and grab bars. She is mostly IADL inside the home, her spouse and adult children assist w/ all ADL outside the home. PCP is Dr. Cruz and preferred pharmacy is  [facility]. Pt has had [facility] for HH in the past, she would lie to use [facility] again if needed. Pt has been to [facility], she denies SNF needs at this time. Pt states her family will assist after dc and daughter/Mary will provide dc transportation. Epic referral sent to [facility], CCP will follow progress.              Continued Care and Services - Admitted Since 1/8/2022    Coordination has not been started for this encounter.       Expected Discharge Date and Time     Expected Discharge Date Expected Discharge Time    Jan 12, 2022          Demographic Summary    No documentation.                Functional Status     Row Name 01/10/22 1504       Functional Status    Usual Activity Tolerance moderate       Functional Status, IADL    Medications independent; assistive person    Meal Preparation independent    Housekeeping assistive equipment and person    Laundry independent    Shopping assistive equipment and person       Mental Status Summary    Recent Changes in Mental Status/Cognitive Functioning no changes               Psychosocial    No documentation.                Abuse/Neglect    No documentation.                Legal    No documentation.                Substance Abuse    No documentation.                Patient Forms    No documentation.                   Carisa Amaya RN

## 2022-01-10 NOTE — PROGRESS NOTES
[facility]  Inpatient Progress Note    Reason for Followup:  Abdominal pain, heme positive stool    Subjective     Interval History:   No c/o abdominal pain overnight.    Current Facility-Administered Medications:   •  acetaminophen (TYLENOL) tablet 650 mg, 650 mg, Oral, Q4H PRN, 650 mg at 01/10/22 0651 **OR** acetaminophen (TYLENOL) 160 MG/5ML solution 650 mg, 650 mg, Oral, Q4H PRN **OR** acetaminophen (TYLENOL) suppository 650 mg, 650 mg, Rectal, Q4H PRN, Abby Rodgers APRN  •  ipratropium (ATROVENT HFA) inhaler 2 puff, 2 puff, Inhalation, Q4H PRN **AND** albuterol sulfate HFA (PROVENTIL HFA;VENTOLIN HFA;PROAIR HFA) inhaler 2 puff, 2 puff, Inhalation, Q4H PRN, Abby Rodgers APRN  •  amLODIPine (NORVASC) tablet 5 mg, 5 mg, Oral, Daily, Tawanna Vaughn MD, 5 mg at 01/09/22 0842  •  aspirin EC tablet 81 mg, 81 mg, Oral, Daily, Tawanna Vaughn MD, 81 mg at 01/09/22 0842  •  atorvastatin (LIPITOR) tablet 40 mg, 40 mg, Oral, Nightly, Tawanna Vaughn MD, 40 mg at 01/09/22 2033  •  AZITHROMYCIN 500 MG/250 ML 0.9% NS IVPB (vial-mate), 500 mg, Intravenous, Q24H, Tawanna Vaughn MD, 500 mg at 01/09/22 1234  •  budesonide-formoterol (SYMBICORT) 160-4.5 MCG/ACT inhaler 2 puff, 2 puff, Inhalation, BID - RT, Tawanna Vaughn MD, 2 puff at 01/09/22 2024  •  calcium carbonate (TUMS) chewable tablet 500 mg (200 mg elemental), 2 tablet, Oral, BID PRN, Abby Rodgers APRN  •  carvedilol (COREG) tablet 25 mg, 25 mg, Oral, BID With Meals, Tawanna Vaughn MD, 25 mg at 01/09/22 1747  •  cefTRIAXone (ROCEPHIN) 1 g in sodium chloride 0.9 % 100 mL IVPB-VTB, 1 g, Intravenous, Q24H, Abby Rodgers APRN, Last Rate: 200 mL/hr at 01/10/22 0229, 1 g at 01/10/22 0229  •  dexamethasone (DECADRON) tablet 6 mg, 6 mg, Oral, Daily With Breakfast, Abby Rodgers APRN, 6 mg at 01/09/22 0842  •  DULoxetine (CYMBALTA) DR capsule 30 mg, 30 mg, Oral, Daily, Tawanna Vaughn MD, 30 mg  at 01/09/22 0842  •  enoxaparin (LOVENOX) syringe 40 mg, 40 mg, Subcutaneous, Q24H, Tawanna Vaughn MD, 40 mg at 01/09/22 0842  •  folic acid (FOLVITE) tablet 1 mg, 1 mg, Oral, Daily, Tawanna Vaughn MD, 1 mg at 01/09/22 0842  •  guaiFENesin (MUCINEX) 12 hr tablet 600 mg, 600 mg, Oral, Q12H, Tawanna Vaughn MD, 600 mg at 01/09/22 2033  •  hydrocortisone (ANUSOL-HC) suppository 25 mg, 25 mg, Rectal, BID, Tawanna Vaughn MD, 25 mg at 01/09/22 2034  •  latanoprost (XALATAN) 0.005 % ophthalmic solution 1 drop, 1 drop, Both Eyes, Nightly, Tawanna Vaughn MD, 1 drop at 01/09/22 2034  •  Magnesium Sulfate 2 gram Bolus, followed by 8 gram infusion (total Mg dose 10 grams)- Mg less than or equal to 1mg/dL, 2 g, Intravenous, PRN **OR** Magnesium Sulfate 2 gram / 50mL Infusion (GIVE X 3 BAGS TO EQUAL 6GM TOTAL DOSE) - Mg 1.1 - 1.5 mg/dl, 2 g, Intravenous, PRN, Last Rate: 25 mL/hr at 01/08/22 1303, 2 g at 01/08/22 1303 **OR** Magnesium Sulfate 4 gram infusion- Mg 1.6-1.9 mg/dL, 4 g, Intravenous, PRN, Abby Rodgers APRN  •  nitroglycerin (NITROSTAT) SL tablet 0.4 mg, 0.4 mg, Sublingual, Q5 Min PRN, Abby Rodgers APRN  •  ondansetron (ZOFRAN) tablet 4 mg, 4 mg, Oral, Q6H PRN **OR** ondansetron (ZOFRAN) injection 4 mg, 4 mg, Intravenous, Q6H PRN, Abby Rodgers APRN  •  pantoprazole (PROTONIX) injection 40 mg, 40 mg, Intravenous, Q AM, Tawanna Vaughn MD, 40 mg at 01/10/22 0578  •  Pharmacy to dose vancomycin, , Does not apply, Continuous PRN, Eliza Andrews, APRN  •  polycarbophil tablet 625 mg, 625 mg, Oral, BID, Christi Benjamin MD, 625 mg at 01/09/22 5207  •  potassium chloride (K-DUR,KLOR-CON) ER tablet 40 mEq, 40 mEq, Oral, PRN, 40 mEq at 01/08/22 1301 **OR** potassium chloride (KLOR-CON) packet 40 mEq, 40 mEq, Oral, PRN **OR** potassium chloride 10 mEq in 100 mL IVPB, 10 mEq, Intravenous, Q1H PRN, Abby Rodgers, APRN  •  [COMPLETED] Insert peripheral IV, , , Once **AND** sodium  chloride 0.9 % flush 10 mL, 10 mL, Intravenous, PRN, Petr Rayo PA  •  sodium chloride 0.9 % flush 10 mL, 10 mL, Intravenous, Q12H, Abby Rodgers APRN, 10 mL at 01/09/22 2034  •  sodium chloride 0.9 % flush 10 mL, 10 mL, Intravenous, PRN, Abby Rodgers APRN  •  timolol (TIMOPTIC) 0.25 % ophthalmic solution 1 drop, 1 drop, Both Eyes, Daily, Tawanna Vaughn MD, 1 drop at 01/09/22 0842  •  traMADol (ULTRAM) tablet 50 mg, 50 mg, Oral, Q8H PRN, Tawanna Vaughn MD  •  traZODone (DESYREL) tablet 50 mg, 50 mg, Oral, Nightly, Tawanna Vaughn MD, 50 mg at 01/09/22 2033  •  vancomycin (VANCOCIN) in iso-osmotic dextrose IVPB 1 g (premix) 200 mL, 1,000 mg, Intravenous, Once, Eliza Andrews APRN, 1,000 mg at 01/10/22 0639  Review of Systems:    The following systems were reviewed and negative;  gastrointestinal    Objective     Vital Signs  Temp:  [97 °F (36.1 °C)-98.5 °F (36.9 °C)] 97 °F (36.1 °C)  Heart Rate:  [] 88  Resp:  [16-18] 16  BP: (133-160)/(84-97) 146/84  Body mass index is 16.05 kg/m².  No intake or output data in the 24 hours ending 01/10/22 0705  No intake/output data recorded.     Physical Exam:   General: patient awake, alert and cooperative   Abdomen: soft, nontender, nondistended; normal bowel sounds   Rectal: deferred   Extremities: no rash or edema   Psychiatric: Normal mood and behavior; memory intact     Results Review:     I reviewed the patient's new clinical results.    Results from last 7 days   Lab Units 01/10/22  0454 01/09/22  1233 01/08/22  1127   WBC 10*3/mm3 11.76* 23.03* 24.87*   HEMOGLOBIN g/dL 11.0* 11.2* 11.3*   HEMATOCRIT % 32.5* 34.0 32.4*   PLATELETS 10*3/mm3 389 394 420     Results from last 7 days   Lab Units 01/08/22  1635 01/08/22  1127 01/08/22  0037   SODIUM mmol/L 135* 135* 130*   POTASSIUM mmol/L 4.9 3.8 3.4*   CHLORIDE mmol/L 97* 97* 91*   CO2 mmol/L 24.8 28.3 29.4*   BUN mg/dL 9 7* 13   CREATININE mg/dL 0.44* 0.35* 0.40*   CALCIUM mg/dL 8.5  8.6 8.8   BILIRUBIN mg/dL 0.2  --  0.3   ALK PHOS U/L 82  --  73   ALT (SGPT) U/L 15  --  16   AST (SGOT) U/L 17  --  22   GLUCOSE mg/dL 112* 145* 125*         Lab Results   Lab Value Date/Time    LIPASE 45 01/08/2022 1635    LIPASE 58 08/12/2021 1011    LIPASE 53 05/15/2021 0639    LIPASE 41 04/20/2021 1158    LIPASE 194 06/09/2020 1618    LIPASE 774 (H) 12/30/2019 1226    LIPASE 5,299 (H) 12/16/2019 1609    LIPASE 246 07/11/2019 2158    LIPASE 213 05/17/2018 1306       Assessment/Plan   Assessment:   1.  Abdominal pain, acute on chronic.  Appears to be at baseline  2.  Mild anemia  3.  Heme positive stool  4.  COVID 19 PNA    All problems new to me today    Plan:   Abdominal pain seems to be at baseline.  She has hx of IBS-m followed by Dr Fernández.  Hb minimally depressed, no e/o overt bleeding.  Given age and COVID 19 PNA, no plans for endoscopic evaluation at this time.  She can f/u in office with Dr Fernández upon discharge.     GI will see again as needed    I discussed the patient's findings and my recommendations with patient.         Neville Corley M.D.  [facility]  [address] [place] [address] [address] [address]  [phone]

## 2022-01-10 NOTE — PROGRESS NOTES
Name: Bo Adan ADMIT: 2022   : 10/24/1930  PCP: Hernan Cruz MD    MRN: [id number] LOS: 2 days   AGE/SEX: 89 y.o. female  ROOM: [facility]     Subjective   Subjective   Feeling a little better today. No abd pain or N/V. Tolerating diet. Voiding well now. Still coughing a bit. No SOA or CP.    Review of Systems   Constitutional: Positive for fatigue. Negative for appetite change, chills, diaphoresis and fever.   HENT: Negative for congestion, ear pain, nosebleeds, rhinorrhea, sore throat, trouble swallowing and voice change.    Eyes: Negative for pain and visual disturbance.   Respiratory: Positive for cough. Negative for chest tightness, shortness of breath, wheezing and stridor.    Cardiovascular: Negative for chest pain and palpitations.   Gastrointestinal: Negative for abdominal pain, blood in stool, diarrhea, nausea and vomiting.   Endocrine: Negative for cold intolerance and heat intolerance.   Genitourinary: Negative for decreased urine volume, difficulty urinating, dysuria and hematuria.   Musculoskeletal: Negative for back pain and neck pain.   Skin: Negative for color change, pallor and rash.   Allergic/Immunologic: Negative for environmental allergies and food allergies.   Neurological: Negative for tremors, seizures, syncope, speech difficulty and headaches.   Hematological: Negative for adenopathy. Does not bruise/bleed easily.   Psychiatric/Behavioral: Negative for behavioral problems, confusion and hallucinations.        Objective   Objective   Vital Signs  Temp:  [97 °F (36.1 °C)-98.1 °F (36.7 °C)] 98.1 °F (36.7 °C)  Heart Rate:  [] 104  Resp:  [16-18] 16  BP: (141-159)/(83-99) 141/83  SpO2:  [96 %-99 %] 98 %  on   ;   Device (Oxygen Therapy): room air  Body mass index is 16.01 kg/m².  Physical Exam  Vitals and nursing note reviewed.   Constitutional:       General: She is not in acute distress.     Appearance: She is ill-appearing (chronically). She is not  toxic-appearing or diaphoretic.      Comments: Frail and elderly   HENT:      Head: Normocephalic and atraumatic.      Right Ear: External ear normal.      Left Ear: External ear normal.      Nose: Nose normal.      Mouth/Throat:      Mouth: Mucous membranes are moist.      Pharynx: Oropharynx is clear.   Eyes:      General: No scleral icterus.        Right eye: No discharge.         Left eye: No discharge.      Conjunctiva/sclera: Conjunctivae normal.   Cardiovascular:      Rate and Rhythm: Normal rate and regular rhythm.      Pulses: Normal pulses.      Heart sounds: Murmur heard.       Pulmonary:      Effort: No respiratory distress.      Breath sounds: No wheezing, rhonchi or rales.      Comments: CTAB with decreased BS right base  Abdominal:      General: Bowel sounds are normal. There is no distension.      Palpations: Abdomen is soft.      Tenderness: There is no abdominal tenderness.   Musculoskeletal:         General: No swelling or deformity. Normal range of motion.      Cervical back: Neck supple. No rigidity.   Lymphadenopathy:      Cervical: No cervical adenopathy.   Skin:     General: Skin is warm and dry.      Capillary Refill: Capillary refill takes less than 2 seconds.      Coloration: Skin is not jaundiced.      Findings: No rash.   Neurological:      General: No focal deficit present.      Mental Status: She is alert and oriented to person, place, and time. Mental status is at baseline.      Cranial Nerves: No cranial nerve deficit.      Coordination: Coordination normal.      Comments: [facility]   Psychiatric:         Mood and Affect: Mood normal.         Behavior: Behavior normal.      Comments: Very pleasant         Results Review     I reviewed the patient's new clinical results.  Results from last 7 days   Lab Units 01/10/22  0454 01/09/22  1233 01/08/22  1127 01/08/22  0037   WBC 10*3/mm3 11.76* 23.03* 24.87* 25.49*   HEMOGLOBIN g/dL 11.0* 11.2* 11.3* 11.8*   PLATELETS 10*3/mm3 389 394 420 362      Results from last 7 days   Lab Units 01/10/22  0454 01/08/22  1635 01/08/22  1127 01/08/22  0037   SODIUM mmol/L 133* 135* 135* 130*   POTASSIUM mmol/L 4.4 4.9 3.8 3.4*   CHLORIDE mmol/L 94* 97* 97* 91*   CO2 mmol/L 22.3 24.8 28.3 29.4*   BUN mg/dL 10 9 7* 13   CREATININE mg/dL 0.38* 0.44* 0.35* 0.40*   GLUCOSE mg/dL 95 112* 145* 125*   EGFR IF NONAFRICN AM mL/min/1.73 >150 134 >150 150     Results from last 7 days   Lab Units 01/10/22  0454 01/08/22  1635 01/08/22  0037   ALBUMIN g/dL 3.30* 3.30* 3.70   BILIRUBIN mg/dL 0.3 0.2 0.3   ALK PHOS U/L 68 82 73   AST (SGOT) U/L 17 17 22   ALT (SGPT) U/L 13 15 16     Results from last 7 days   Lab Units 01/10/22  0454 01/09/22  1233 01/08/22  1635 01/08/22  1127 01/08/22  0037   CALCIUM mg/dL 8.9  --  8.5 8.6 8.8   ALBUMIN g/dL 3.30*  --  3.30*  --  3.70   MAGNESIUM mg/dL 1.9 1.8  --  2.3 1.3*     Results from last 7 days   Lab Units 01/10/22  0454 01/08/22  1127 01/08/22  0037   PROCALCITONIN ng/mL 0.05 0.05 0.07   LACTATE mmol/L  --   --  1.1     COVID19   Date Value Ref Range Status   01/08/2022 Detected (C) Not Detected - Ref. Range Final   01/08/2022 Detected (C) Not Detected - Ref. Range Final     No results found for: HGBA1C, POCGLU    Adult Transthoracic Echo Complete W/ Cont if Necessary Per Protocol  · Calculated left ventricular EF = 40% Estimated left ventricular EF was   in agreement with the calculated left ventricular EF. Left ventricular   systolic function is moderately decreased.  · The following left ventricular wall segments are hypokinetic: mid   inferolateral, mid inferior, basal inferoseptal, basal inferior and basal   inferoseptal.  · Left ventricular diastolic function is consistent with (grade I)   impaired relaxation.  · There is moderate calcification of the aortic valve.  · Aortic valve maximum pressure gradient is 6.3 mmHg. Aortic valve mean   pressure gradient is 3.4 mmHg.  · Mild tricuspid valve regurgitation is present.  · Estimated  right ventricular systolic pressure from tricuspid   regurgitation is normal (<35 mmHg).  · There is a circumferential pericardial effusion.       Scheduled Medications  amLODIPine, 5 mg, Oral, Daily  aspirin, 81 mg, Oral, Daily  atorvastatin, 40 mg, Oral, Nightly  budesonide-formoterol, 2 puff, Inhalation, BID - RT  carvedilol, 25 mg, Oral, BID With Meals  DULoxetine, 30 mg, Oral, Daily  [START ON 1/11/2022] enoxaparin, 30 mg, Subcutaneous, Q24H  folic acid, 1 mg, Oral, Daily  guaiFENesin, 600 mg, Oral, Q12H  hydrocortisone, 25 mg, Rectal, BID  latanoprost, 1 drop, Both Eyes, Nightly  [START ON 1/11/2022] pantoprazole, 40 mg, Oral, Daily  calcium polycarbophil, 625 mg, Oral, BID  sodium chloride, 10 mL, Intravenous, Q12H  timolol, 1 drop, Both Eyes, Daily  traZODone, 50 mg, Oral, Nightly    Infusions  Pharmacy to dose vancomycin,     Diet  Diet Regular       Assessment/Plan     Active Hospital Problems    Diagnosis  POA   • **Pneumonia of right lower lobe due to methicillin resistant Staphylococcus aureus (MRSA) ([facility]) [J15.212]  Yes   • Occult GI bleeding [R19.5]  Yes   • Hypokalemia [E87.6]  Yes   • Hypomagnesemia [E83.42]  Yes   • Essential hypertension [I10]  Yes   • GERD without esophagitis [K21.9]  Yes   • History of cervical cancer [Z85.41]  Not Applicable   • HLD (hyperlipidemia) [E78.5]  Yes      Resolved Hospital Problems   No resolved problems to display.       89 y.o. female admitted with Pneumonia of right lower lobe due to methicillin resistant Staphylococcus aureus (MRSA) ([facility]).    1.  Lower abdominal pain/diarrhea/questionable melena/urinary retention by CT scan.    Continues with abdominal pain.  Benign GI examination.  Urinary retention has resolved clinically.   Placed on urinary retention protocol.  Stool for C. difficile/stool PCR are negative.  Positive Hemoccult stool.  Lipase is normal.  CT scan of the abdomen revealed no acute intra-abdominal process.  I believe the patient is too sick to  undergo endoscopies at this time.  GI says her abd pain is chronic and not far from her baseline, and her Hgb is stable. They recommend f/u with Dr. Fernández as outpt. Continue Protonix.  2.  MRSA Pneumonia/Bacteremia (on admission felt to be COViD with possible superimposed bacterial infection)/leukocytosis.  Patient was out of the window for remdesivir treatment. Inflammatory markers falling. S/p tx with Decadron/Rocephin/Zithromax (see below). Negative Legionella and Strep urinary antigen. Sputum culture negative. Blood culture growing MRSA in one bottle. ID consulted. COViD tx and Rocephin stopped and IV Vanc started for what ID feels is MRSA PNA with bacteremia. Echo obtained to r/o endocarditis--no evidence. Negative respiratory PCR panel except for COViD. Please note that the leukocytosis could also be secondary to steroids. DDimer slightly elevated and pt had PNA a couple months ago, CTA chest pending.   3.  Heme+ Anemia.  Hemoglobin stable around baseline.  Will monitor. See #1.  4. Hypomagnesemia/hypokalemia/hyponatremia.  Hyponatremia is secondary to dehydration which has improved after IV NS.  Stable today in low 130s. Hypokalemia resolved with replacement.  Mg++ level fine. Normal renal function.   5.  Hypertension.  No evidence of angina or congestive heart failure.  We will continue Norvasc and Coreg and monitor.  Patient is slightly tachycardic at times.    6.  Hyperlipidemia.  Continue Lipitor.  7.  Abnl Echo. EF only 40% with LV hypokinesis and pericardial effusion. Will ask Card to see.    During all interaction with pt proper PPE was utilized (gown, gloves, mask, goggles, and face shield) and was donned/doffed according to recommendations. Hands were cleaned before and after interaction.    Lovenox 30 mg SC daily for DVT prophylaxis.  Full code.  Discussed with patient, family, nursing staff, CCP and care team on multidisciplinary rounds.  Anticipate discharge home with home health, timing yet to be  determined.      Petr Raymundo MD  [facility]  01/10/22  16:54 EST

## 2022-01-10 NOTE — PLAN OF CARE
Goal Outcome Evaluation:  Plan of Care Reviewed With: patient        Progress: improving  Outcome Summary: VSS, on ra. no c/o of pain or sob. IV abx, blood cultures showed mrsa, vanc started. up to bedside commode. bladder scan q4 hr. gi not doing scopes. will ctm

## 2022-01-10 NOTE — PROGRESS NOTES
"[facility] Clinical Pharmacy Services: Vancomycin Pharmacokinetic Initial Consult Note    Bo Adan is a 89 y.o. female who is on day 1 of pharmacy to dose vancomycin.    Indication: bacteremia   Consulting Provider: ASHER HENSON  Planned Duration of Therapy: 5d  Loading Dose Ordered or Given:  1/10 0639 vanc 1000 mg x1    Culture/Source: 1/8 0038  bcx mrsa 1/8 0053 bcx ngtd   Target: Dose by Levels  Other Antimicrobials: CTX    Vitals/Labs  Ht: 152.4 cm (60\"); Wt: 37.3 kg (82 lb 3.2 oz)  Temp Readings from Last 1 Encounters:   01/10/22 97.9 °F (36.6 °C) (Oral)    Estimated Creatinine Clearance: 27 mL/min (A) (by C-G formula based on SCr of 0.38 mg/dL (L)).       Results from last 7 days   Lab Units 01/10/22  0454 01/09/22  1233 01/08/22  1635 01/08/22  1127   CREATININE mg/dL 0.38*  --  0.44* 0.35*   WBC 10*3/mm3 11.76* 23.03*  --  24.87*     Assessment/Plan:    Vancomycin Dose: intermittent dosing based on levels (low wt / renal fxn poor based on CrCl, but Scr low at 0.38)   Vanc Random has been ordered for 1/10 at 1800 (12h level)      Pharmacy will follow patient's kidney function and will adjust doses and obtain levels as necessary. Thank you for involving pharmacy in this patient's care. Please contact pharmacy with any questions or concerns.                           Kaci Patel [facility]  Clinical Pharmacist    "

## 2022-01-10 NOTE — PLAN OF CARE
Goal Outcome Evaluation:            Pt VSS, A&O X4,BM today, purewick, bladder scan q 8 hours, pt had 20 gauge rt ac for CT angiogram for chest, consult called to cardiology, protonix changed to oral.

## 2022-01-11 ENCOUNTER — APPOINTMENT (OUTPATIENT)
Dept: CT IMAGING | Facility: HOSPITAL | Age: 87
End: 2022-01-11

## 2022-01-11 PROBLEM — I50.22 CHRONIC SYSTOLIC CHF (CONGESTIVE HEART FAILURE): Chronic | Status: ACTIVE | Noted: 2022-01-11

## 2022-01-11 PROBLEM — E43 SEVERE MALNUTRITION: Status: ACTIVE | Noted: 2022-01-11

## 2022-01-11 LAB
ALBUMIN SERPL-MCNC: 3.1 G/DL (ref 3.5–5.2)
ALBUMIN/GLOB SERPL: 1.1 G/DL
ALP SERPL-CCNC: 65 U/L (ref 39–117)
ALT SERPL W P-5'-P-CCNC: 15 U/L (ref 1–33)
ANION GAP SERPL CALCULATED.3IONS-SCNC: 10.7 MMOL/L (ref 5–15)
AST SERPL-CCNC: 14 U/L (ref 1–32)
BASOPHILS # BLD AUTO: 0.01 10*3/MM3 (ref 0–0.2)
BASOPHILS NFR BLD AUTO: 0.1 % (ref 0–1.5)
BILIRUB SERPL-MCNC: 0.4 MG/DL (ref 0–1.2)
BUN SERPL-MCNC: 10 MG/DL (ref 8–23)
BUN/CREAT SERPL: 31.3 (ref 7–25)
CALCIUM SPEC-SCNC: 8.7 MG/DL (ref 8.2–9.6)
CHLORIDE SERPL-SCNC: 94 MMOL/L (ref 98–107)
CO2 SERPL-SCNC: 27.3 MMOL/L (ref 22–29)
CREAT SERPL-MCNC: 0.32 MG/DL (ref 0.57–1)
CRP SERPL-MCNC: 1.27 MG/DL (ref 0–0.5)
D DIMER PPP FEU-MCNC: 1 MCGFEU/ML (ref 0–0.49)
DEPRECATED RDW RBC AUTO: 46.9 FL (ref 37–54)
EOSINOPHIL # BLD AUTO: 0 10*3/MM3 (ref 0–0.4)
EOSINOPHIL NFR BLD AUTO: 0 % (ref 0.3–6.2)
ERYTHROCYTE [DISTWIDTH] IN BLOOD BY AUTOMATED COUNT: 13.2 % (ref 12.3–15.4)
FERRITIN SERPL-MCNC: 568 NG/ML (ref 13–150)
GFR SERPL CREATININE-BSD FRML MDRD: >150 ML/MIN/1.73
GLOBULIN UR ELPH-MCNC: 2.7 GM/DL
GLUCOSE SERPL-MCNC: 90 MG/DL (ref 65–99)
HCT VFR BLD AUTO: 35 % (ref 34–46.6)
HGB BLD-MCNC: 11.7 G/DL (ref 12–15.9)
IMM GRANULOCYTES # BLD AUTO: 0.12 10*3/MM3 (ref 0–0.05)
IMM GRANULOCYTES NFR BLD AUTO: 1.2 % (ref 0–0.5)
LYMPHOCYTES # BLD AUTO: 0.34 10*3/MM3 (ref 0.7–3.1)
LYMPHOCYTES NFR BLD AUTO: 3.3 % (ref 19.6–45.3)
MAGNESIUM SERPL-MCNC: 1.6 MG/DL (ref 1.7–2.3)
MCH RBC QN AUTO: 32.9 PG (ref 26.6–33)
MCHC RBC AUTO-ENTMCNC: 33.4 G/DL (ref 31.5–35.7)
MCV RBC AUTO: 98.3 FL (ref 79–97)
MONOCYTES # BLD AUTO: 0.58 10*3/MM3 (ref 0.1–0.9)
MONOCYTES NFR BLD AUTO: 5.6 % (ref 5–12)
NEUTROPHILS NFR BLD AUTO: 89.8 % (ref 42.7–76)
NEUTROPHILS NFR BLD AUTO: 9.28 10*3/MM3 (ref 1.7–7)
NRBC BLD AUTO-RTO: 0 /100 WBC (ref 0–0.2)
PLATELET # BLD AUTO: 460 10*3/MM3 (ref 140–450)
PMV BLD AUTO: 9.3 FL (ref 6–12)
POTASSIUM SERPL-SCNC: 3.3 MMOL/L (ref 3.5–5.2)
PROT SERPL-MCNC: 5.8 G/DL (ref 6–8.5)
RBC # BLD AUTO: 3.56 10*6/MM3 (ref 3.77–5.28)
SODIUM SERPL-SCNC: 132 MMOL/L (ref 136–145)
WBC NRBC COR # BLD: 10.33 10*3/MM3 (ref 3.4–10.8)

## 2022-01-11 PROCEDURE — 99233 SBSQ HOSP IP/OBS HIGH 50: CPT | Performed by: INTERNAL MEDICINE

## 2022-01-11 PROCEDURE — 25010000002 VANCOMYCIN 10 G RECONSTITUTED SOLUTION: Performed by: NURSE PRACTITIONER

## 2022-01-11 PROCEDURE — 94761 N-INVAS EAR/PLS OXIMETRY MLT: CPT

## 2022-01-11 PROCEDURE — 85025 COMPLETE CBC W/AUTO DIFF WBC: CPT | Performed by: INTERNAL MEDICINE

## 2022-01-11 PROCEDURE — 25010000002 MAGNESIUM SULFATE 2 GM/50ML SOLUTION: Performed by: HOSPITALIST

## 2022-01-11 PROCEDURE — 25010000002 VANCOMYCIN 750 MG RECONSTITUTED SOLUTION: Performed by: NURSE PRACTITIONER

## 2022-01-11 PROCEDURE — 25010000002 ENOXAPARIN PER 10 MG: Performed by: INTERNAL MEDICINE

## 2022-01-11 PROCEDURE — 85379 FIBRIN DEGRADATION QUANT: CPT | Performed by: NURSE PRACTITIONER

## 2022-01-11 PROCEDURE — 94799 UNLISTED PULMONARY SVC/PX: CPT

## 2022-01-11 PROCEDURE — 71275 CT ANGIOGRAPHY CHEST: CPT

## 2022-01-11 PROCEDURE — 82728 ASSAY OF FERRITIN: CPT | Performed by: INTERNAL MEDICINE

## 2022-01-11 PROCEDURE — 94760 N-INVAS EAR/PLS OXIMETRY 1: CPT

## 2022-01-11 PROCEDURE — 83735 ASSAY OF MAGNESIUM: CPT | Performed by: INTERNAL MEDICINE

## 2022-01-11 PROCEDURE — 86140 C-REACTIVE PROTEIN: CPT | Performed by: INTERNAL MEDICINE

## 2022-01-11 PROCEDURE — 80053 COMPREHEN METABOLIC PANEL: CPT | Performed by: INTERNAL MEDICINE

## 2022-01-11 PROCEDURE — 0 IOPAMIDOL PER 1 ML: Performed by: HOSPITALIST

## 2022-01-11 RX ORDER — POTASSIUM CHLORIDE 750 MG/1
40 TABLET, FILM COATED, EXTENDED RELEASE ORAL ONCE
Status: COMPLETED | OUTPATIENT
Start: 2022-01-11 | End: 2022-01-11

## 2022-01-11 RX ORDER — CALCIUM POLYCARBOPHIL 625 MG
625 TABLET ORAL DAILY
Status: DISCONTINUED | OUTPATIENT
Start: 2022-01-12 | End: 2022-01-12

## 2022-01-11 RX ORDER — MAGNESIUM SULFATE HEPTAHYDRATE 40 MG/ML
2 INJECTION, SOLUTION INTRAVENOUS ONCE
Status: COMPLETED | OUTPATIENT
Start: 2022-01-11 | End: 2022-01-11

## 2022-01-11 RX ADMIN — CARVEDILOL 25 MG: 25 TABLET, FILM COATED ORAL at 08:25

## 2022-01-11 RX ADMIN — CARVEDILOL 25 MG: 25 TABLET, FILM COATED ORAL at 18:15

## 2022-01-11 RX ADMIN — LATANOPROST 1 DROP: 50 SOLUTION OPHTHALMIC at 20:13

## 2022-01-11 RX ADMIN — ASPIRIN 81 MG: 81 TABLET, COATED ORAL at 08:25

## 2022-01-11 RX ADMIN — AMLODIPINE BESYLATE 5 MG: 5 TABLET ORAL at 08:22

## 2022-01-11 RX ADMIN — ATORVASTATIN CALCIUM 40 MG: 20 TABLET, FILM COATED ORAL at 20:13

## 2022-01-11 RX ADMIN — GUAIFENESIN 600 MG: 600 TABLET, EXTENDED RELEASE ORAL at 20:13

## 2022-01-11 RX ADMIN — DULOXETINE HYDROCHLORIDE 30 MG: 30 CAPSULE, DELAYED RELEASE ORAL at 08:22

## 2022-01-11 RX ADMIN — DILTIAZEM HYDROCHLORIDE 30 MG: 30 TABLET, FILM COATED ORAL at 15:04

## 2022-01-11 RX ADMIN — POTASSIUM CHLORIDE 40 MEQ: 750 TABLET, EXTENDED RELEASE ORAL at 15:03

## 2022-01-11 RX ADMIN — IOPAMIDOL 95 ML: 755 INJECTION, SOLUTION INTRAVENOUS at 13:16

## 2022-01-11 RX ADMIN — HYDROCORTISONE ACETATE 25 MG: 25 SUPPOSITORY RECTAL at 20:13

## 2022-01-11 RX ADMIN — SODIUM CHLORIDE, PRESERVATIVE FREE 10 ML: 5 INJECTION INTRAVENOUS at 08:27

## 2022-01-11 RX ADMIN — MAGNESIUM SULFATE HEPTAHYDRATE 2 G: 2 INJECTION, SOLUTION INTRAVENOUS at 12:21

## 2022-01-11 RX ADMIN — FOLIC ACID 1 MG: 1 TABLET ORAL at 08:22

## 2022-01-11 RX ADMIN — TRAZODONE HYDROCHLORIDE 50 MG: 50 TABLET ORAL at 20:12

## 2022-01-11 RX ADMIN — TIMOLOL MALEATE 1 DROP: 2.5 SOLUTION OPHTHALMIC at 08:27

## 2022-01-11 RX ADMIN — ACETAMINOPHEN 650 MG: 325 TABLET, FILM COATED ORAL at 15:04

## 2022-01-11 RX ADMIN — PANTOPRAZOLE SODIUM 40 MG: 40 TABLET, DELAYED RELEASE ORAL at 08:26

## 2022-01-11 RX ADMIN — ENOXAPARIN SODIUM 30 MG: 30 INJECTION SUBCUTANEOUS at 08:28

## 2022-01-11 RX ADMIN — SODIUM CHLORIDE, PRESERVATIVE FREE 10 ML: 5 INJECTION INTRAVENOUS at 20:13

## 2022-01-11 RX ADMIN — GUAIFENESIN 600 MG: 600 TABLET, EXTENDED RELEASE ORAL at 08:25

## 2022-01-11 RX ADMIN — VANCOMYCIN HYDROCHLORIDE 750 MG: 10 INJECTION, POWDER, LYOPHILIZED, FOR SOLUTION INTRAVENOUS at 15:04

## 2022-01-11 RX ADMIN — VANCOMYCIN HYDROCHLORIDE 750 MG: 10 INJECTION, POWDER, LYOPHILIZED, FOR SOLUTION INTRAVENOUS at 00:07

## 2022-01-11 RX ADMIN — BUDESONIDE AND FORMOTEROL FUMARATE DIHYDRATE 2 PUFF: 160; 4.5 AEROSOL RESPIRATORY (INHALATION) at 08:19

## 2022-01-11 NOTE — PROGRESS NOTES
"[facility] Clinical Pharmacy Services: Vancomycin Monitoring Note    Bo Adan is a 89 y.o. female who is on day 2/5 of pharmacy to dose vancomycin for MRSA pneumonia .    Previous Vancomycin Dose:   750 mg IV every  18  hours  Updated Cultures and Sensitivities: 1/8 blood cultures 1/2 MRSA 1/8 blood cultures no growth    Lab Results   Component Value Date    HARISORANDOM 7.70 01/10/2022       Vitals/Labs  Ht: 152.4 cm (60\"); Wt: 37.2 kg (82 lb)   Temp Readings from Last 1 Encounters:   01/11/22 98.4 °F (36.9 °C) (Oral)     Estimated Creatinine Clearance: 26.9 mL/min (A) (by C-G formula based on SCr of 0.32 mg/dL (L)).        Results from last 7 days   Lab Units 01/11/22  0453 01/10/22  0454 01/09/22  1233 01/08/22  1635 01/08/22  1127   CREATININE mg/dL 0.32* 0.38*  --  0.44*  --    WBC 10*3/mm3 10.33 11.76* 23.03*  --    < >    < > = values in this interval not displayed.     Assessment/Plan    Current Vancomycin Dose: 750 mg IV every  18  hours; provides a predicted  mg/L.hr   Therefore will adjust up to 750 mg q12h for a new AUC of 519.  Definitely not a lot of good options to both balance AUC and toxicity.  Going with a higher dose but less frequent considered but would require repeated 33 mg/kg doses. Will check level more frequently to better access.  Next Level Date and Time: Vanc Trough on 1/12 at 1100  We will continue to monitor patient changes and renal function     Thank you for involving pharmacy in this patient's care. Please contact pharmacy with any questions or concerns.       Marcel Lozano, [facility]  Clinical Pharmacist          "

## 2022-01-11 NOTE — CONSULTS
"Adult Nutrition  Assessment/PES    Patient Name:  Bo Adan  YOB: 1930  MRN: [id number]  Admit Date:  1/8/2022    Assessment Date:  1/11/2022    Comments: BMI 16.01. Pt reported a good appetite but that she can't much and can't gain weight. She said her weight has been low since a year ago when she had cancer. She likes hot foods and not cold foods. She does not like Boost Plus and does not like ice cream or pudding. Will order her Boost Breeze to try to see if she likes it. Will continue to work with patient. Continue to follow and make recommendations as needed.     Reason for Assessment     Row Name 01/11/22 1214          Reason for Assessment    Reason For Assessment identified at risk by screening criteria     Diagnosis other (see comments)  Admit w/ PNA due to MRSA, high bp, acid reflux disease, hx of cervical cancer, high chol/trig, low blood potassium, hypomagnesemia, blood in stool, heart failure, hx: anemia, diarrhea, hx pancreatitis, chest pain, diarrhea, inflam small intenstine     Identified At Risk by Screening Criteria BMI                Nutrition/Diet History     Row Name 01/11/22 1216          Nutrition/Diet History    Typical Food/Fluid Intake Said her appetite is good but she can't eat much. She also said she has a hard time gaining weight. She said her wt has been low since about a year ago when she got cancer.     Food Preferences Likes hot foods. Likes hot oatmeal.     Supplemental Drinks/Foods/Additives Does not want any supplements: Boost, Boost Pudding, or Magic Cup.     Factors Affecting Nutritional Intake other (see comments)  decreased appetite                Anthropometrics     Row Name 01/11/22 1245 01/11/22 1218       Anthropometrics    Height 152.4 cm (60\") 152.4 cm (60\")    Weight -- 37.2 kg (82 lb 0.2 oz)  not weighed by RD       Ideal Body Weight (IBW)    Ideal Body Weight (IBW) (kg) 45.86 45.86    % Ideal Body Weight -- 81.12    % of Ideal Body Weight " "Assessment -- 80-90%: mild deficit       Usual Body Weight (UBW)    Weight Loss -- unintentional    Weight Loss Time Frame -- 8# wt loss in 3 months (-8.9%, significant), 10/8/2021: 90#, 1/10/2022: 82#       Body Mass Index (BMI)    BMI (kg/m2) -- 16.05    BMI Assessment -- BMI 16-16.9: protein-energy malnutrition grade II               Labs/Tests/Procedures/Meds     Row Name 01/11/22 1220          Labs/Procedures/Meds    Lab Results Reviewed reviewed     Lab Results Comments Sodium (low), chloride (low), potassium (low), creatinine (low), platelets (high), magnesium (low), albumin (low), c-reactive protein (high)            Diagnostic Tests/Procedures    Diagnostic Test/Procedure Reviewed reviewed     Diagnostic Test/Procedures Comments CT chest            Medications    Pertinent Medications Reviewed reviewed     Pertinent Medications Comments amlodipine, lipitor, symbicort, coreg, cymbalta, lovenox, folic acid, hydrocortisone, iopamidol, pantoprazole, polycarbophil tablet, sodium chloride, trazodone, vancomycin, PRN: magnesium sulfate, potassium chloride                Physical Findings     Row Name 01/11/22 1245          Physical Findings    Overall Physical Appearance generalized wasting; loss of muscle mass; loss of subcutaneous fat                Estimated/Assessed Needs     Row Name 01/11/22 1245 01/11/22 1218       Calculation Measurements    Weight Used For Calculations 50.4 kg (111 lb 1.8 oz)  ideal weight --    Height 152.4 cm (60\") 152.4 cm (60\")       Estimated/Assessed Needs    Additional Documentation Fluid Requirements (Group); KCAL/KG (Group); Protein Requirements (Group) --       KCAL/KG    KCAL/KG 30 Kcal/Kg (kcal); 35 Kcal/Kg (kcal) --    30 Kcal/Kg (kcal) 1512 --    35 Kcal/Kg (kcal) 1764 --       Protein Requirements    Weight Used For Protein Calculations 50.4 kg (111 lb 1.8 oz)  ideal wt --    Est Protein Requirement Amount (gms/kg) 1.0 gm protein --    Estimated Protein Requirements " (gms/day) 50.4 --       Fluid Requirements    Fluid Requirements (mL/day) 1500 --    RDA Method (mL) 1500 --               Nutrition Prescription Ordered     Row Name 01/11/22 1246          Nutrition Prescription PO    Current PO Diet Regular                Evaluation of Received Nutrient/Fluid Intake     Row Name 01/11/22 1247          PO Evaluation    % PO Intake 50% x 1 meal                  Malnutrition Severity Assessment     Row Name 01/11/22 1247          Malnutrition Severity Assessment    Malnutrition Type Chronic Disease - Related Malnutrition            Unintentional Weight Loss     Unintentional Weight Loss Findings Severe  -8.9% in 3 months. 10/8/2021: 90#, 1/10/2022: 82#     Unintentional Weight Loss  Weight loss greater than 7.5% in three months            Muscle Loss    Loss of Muscle Mass Findings Severe     Christian Region Severe - deep hollowing/scooping, lack of muscle to touch, facial bones well defined     Clavicle Bone Region Moderate - some protrusion in females, visible in males     Acromion Bone Region Moderate - acromion may slightly protrude     Scapular Bone Region Moderate - mild depression, bones may show slightly     Dorsal Hand Region Moderate - slight depression            Fat Loss    Subcutaneous Fat Loss Findings Severe     Orbital Region  Severe - pronounced hollowness/depression, dark circles, loose saggy skin     Upper Arm Region Moderate - some fat tissue, not ample            Criteria Met (Must meet criteria for severity in at least 2 of these categories: M Wasting, Fat Loss, Fluid, Secondary Signs, Wt. Status, Intake)    Patient meets criteria for  Severe Malnutrition                 Problem/Interventions:   Problem 1     Row Name 01/11/22 1250          Nutrition Diagnoses Problem 1    Problem 1 Malnutrition     Etiology (related to) Medical Diagnosis     Cardiac CHF     Signs/Symptoms (evidenced by) BMI     BMI 16 - 16.9                      Intervention Goal     Row Name  01/11/22 1251          Intervention Goal    General Meet nutritional needs for age/condition     PO Tolerate PO; Increase intake; Meet estimated needs     Weight Appropriate weight gain                Nutrition Intervention     Row Name 01/11/22 1251          Nutrition Intervention    RD/Tech Action Supplement offered/refused; Care plan reviewd; Follow Tx progress; Encourage intake                Nutrition Prescription     Row Name 01/11/22 1256          Nutrition Prescription PO    PO Prescription Begin/change supplement     Supplement Boost Breeze     Supplement Frequency Daily     New PO Prescription Ordered? Yes                Education/Evaluation     Row Name 01/11/22 1251          Education    Education Will Instruct as appropriate            Monitor/Evaluation    Monitor PO intake; I&O; Per protocol; Pertinent labs; Weight; Skin status; Symptoms                 Electronically signed by:  Emerita Jacobs RD  01/11/22 12:58 EST

## 2022-01-11 NOTE — PROGRESS NOTES
[facility]: Vancomycin Level Monitoring Note    Bo Adan is a 89 y.o. female who is on day 2 of pharmacy to dose vancomycin for MRSA bacteremia.    Estimated Creatinine Clearance: 26.9 mL/min (A) (by C-G formula based on SCr of 0.38 mg/dL (L)).    Current Vanc Dose: 750 mg IV every  18  hours  Lab Results   Component Value Date    VANCORANDOM 7.70 01/10/2022     Predicted AUC at current dose:403 mg/L.hr  Next Level Date and Time: Vanc Trough on 1/12 @ 0730    Given patient's low weight the available regimens to choose from are kind of limited.  Although the AUC is very close to 400 the next regimen up had a toxicity probability of 14% vs the 7% with the chosen regimen.  Dosing may need to be increased to 750 iv every 12 hours if patient not improving on this regimen.    No changes at this time. Pharmacy is continuing to monitor and will adjust as needed.    Dhruv Mann III, [facility]  Clinical Pharmacist

## 2022-01-11 NOTE — PROGRESS NOTES
ID PROGRESS NOTE    CC: Follow-up sepsis    S:   She does not feel well today.  She is not on any oxygen.  No fevers or chills or night sweats    O:  Physical Exam:  Temp:  [97.8 °F (36.6 °C)-98.6 °F (37 °C)] 98.4 °F (36.9 °C)  Heart Rate:  [] 111  Resp:  [16-18] 18  BP: (109-154)/(77-89) 109/85  Physical Exam  GENERAL: Awake and alert, sickly  HEENT: Oropharynx is clear. Hearing is grossly normal.   HEART: Tachycardic, regular. No peripheral edema.   LUNGS: Rhonchorous with normal respiratory effort.   GI: Soft, nontender, nondistended. No appreciable organomegaly.   SKIN: Warm and dry without cutaneous eruptions   PSYCHIATRIC: Appropriate mood, affect, insight, and judgment.       Diagnostics:    White count 10.3  Hemoglobin 11.7  Platelet 460  Creatinine 0.3    TTE with ejection fraction of 40% multiple hypokinetic segments, grade 1 diastolic dysfunction, moderate calcification of the aortic valve, mild TR and circumferential pericardial effusion    1/8 blood cultures 1/2 MRSA   1/8 blood culture 2/2 no growth to date  1/9 respiratory culture 2+ MRSA    Assessment/Plan   1.  MRSA septicemia from right lower lobe MRSA pneumonia, likely secondary bacterial infection from #2  2.  COVID-19 infection, first tested positive on January 4  3.  Leukocytosis, resolved  4.  Calcification of aortic valve  5.  Pericardial effusion    Discussed with Dr. Au this morning.  She did have a small pericardial effusion on echo but this does not seem consistent either clinically or echocardiographically with purulent pericarditis.  Likely just bacteremic MRSA pneumonia.  CTA of the chest today to better evaluate and for possible PE.  We will press on with the vancomycin for AUC of 400-600.  Dose has been adjusted to every 12 hours dosing and she has been very difficult dose of vancomycin given relatively impaired creatinine clearance given her age and very low weight.  Discussed with Dr. Raymundo as well regarding isolation  policy  Discussed with nursing.  I understand the family is very anxious to allow her to be discharged.  This may be one that we try to treat with oral antibiotics    Margarito Coon MD  01/11/22

## 2022-01-11 NOTE — PROGRESS NOTES
Name: Bo Adan ADMIT: 2022   : 10/24/1930  PCP: Hernan Cruz MD    MRN: [id number] LOS: 3 days   AGE/SEX: 89 y.o. female  ROOM: [facility]     Subjective   Subjective   Feeling a little better today. No abd pain or N/V. Tolerating diet. Voiding well now. Cough more productive today. No SOA or CP. C/o having several BMs in bed overnight.     Review of Systems   Constitutional: Positive for fatigue. Negative for appetite change, chills, diaphoresis and fever.   HENT: Negative for congestion, ear pain, nosebleeds, rhinorrhea, sore throat, trouble swallowing and voice change.    Eyes: Negative for pain and visual disturbance.   Respiratory: Positive for cough. Negative for chest tightness, shortness of breath, wheezing and stridor.    Cardiovascular: Negative for chest pain and palpitations.   Gastrointestinal: Negative for abdominal pain, blood in stool, diarrhea, nausea and vomiting.   Endocrine: Negative for cold intolerance and heat intolerance.   Genitourinary: Negative for decreased urine volume, difficulty urinating, dysuria and hematuria.   Musculoskeletal: Negative for back pain and neck pain.   Skin: Negative for color change, pallor and rash.   Allergic/Immunologic: Negative for environmental allergies and food allergies.   Neurological: Negative for tremors, seizures, syncope, speech difficulty and headaches.   Hematological: Negative for adenopathy. Does not bruise/bleed easily.   Psychiatric/Behavioral: Negative for behavioral problems, confusion and hallucinations.        Objective   Objective   Vital Signs  Temp:  [97.8 °F (36.6 °C)-98.6 °F (37 °C)] 98.4 °F (36.9 °C)  Heart Rate:  [] 111  Resp:  [16-18] 18  BP: (109-154)/(77-89) 109/85  SpO2:  [97 %-98 %] 98 %  on   ;   Device (Oxygen Therapy): room air  Body mass index is 16.01 kg/m².  Physical Exam  Vitals and nursing note reviewed.   Constitutional:       General: She is not in acute distress.     Appearance: She is  ill-appearing (chronically). She is not toxic-appearing or diaphoretic.      Comments: Frail and elderly   HENT:      Head: Normocephalic and atraumatic.      Right Ear: External ear normal.      Left Ear: External ear normal.      Nose: Nose normal.      Mouth/Throat:      Mouth: Mucous membranes are moist.      Pharynx: Oropharynx is clear.   Eyes:      General: No scleral icterus.        Right eye: No discharge.         Left eye: No discharge.      Conjunctiva/sclera: Conjunctivae normal.   Cardiovascular:      Rate and Rhythm: Normal rate and regular rhythm.      Pulses: Normal pulses.      Heart sounds: Murmur heard.       Pulmonary:      Effort: No respiratory distress.      Breath sounds: No wheezing, rhonchi or rales.      Comments: CTAB with decreased BS right base  Abdominal:      General: Bowel sounds are normal. There is no distension.      Palpations: Abdomen is soft.      Tenderness: There is no abdominal tenderness.   Musculoskeletal:         General: No swelling or deformity. Normal range of motion.      Cervical back: Neck supple. No rigidity.   Lymphadenopathy:      Cervical: No cervical adenopathy.   Skin:     General: Skin is warm and dry.      Capillary Refill: Capillary refill takes less than 2 seconds.      Coloration: Skin is not jaundiced.      Findings: No rash.   Neurological:      General: No focal deficit present.      Mental Status: She is alert and oriented to person, place, and time. Mental status is at baseline.      Cranial Nerves: No cranial nerve deficit.      Coordination: Coordination normal.      Comments: [facility]   Psychiatric:         Mood and Affect: Mood normal.         Behavior: Behavior normal.      Comments: Very pleasant         Results Review     I reviewed the patient's new clinical results.  Results from last 7 days   Lab Units 01/11/22  0453 01/10/22  0454 01/09/22  1233 01/08/22  1127   WBC 10*3/mm3 10.33 11.76* 23.03* 24.87*   HEMOGLOBIN g/dL 11.7* 11.0* 11.2* 11.3*    PLATELETS 10*3/mm3 460* 389 394 420     Results from last 7 days   Lab Units 01/11/22  0453 01/10/22  0454 01/08/22  1635 01/08/22  1127   SODIUM mmol/L 132* 133* 135* 135*   POTASSIUM mmol/L 3.3* 4.4 4.9 3.8   CHLORIDE mmol/L 94* 94* 97* 97*   CO2 mmol/L 27.3 22.3 24.8 28.3   BUN mg/dL 10 10 9 7*   CREATININE mg/dL 0.32* 0.38* 0.44* 0.35*   GLUCOSE mg/dL 90 95 112* 145*   EGFR IF NONAFRICN AM mL/min/1.73 >150 >150 134 >150     Results from last 7 days   Lab Units 01/11/22  0453 01/10/22  0454 01/08/22  1635 01/08/22  0037   ALBUMIN g/dL 3.10* 3.30* 3.30* 3.70   BILIRUBIN mg/dL 0.4 0.3 0.2 0.3   ALK PHOS U/L 65 68 82 73   AST (SGOT) U/L 14 17 17 22   ALT (SGPT) U/L 15 13 15 16     Results from last 7 days   Lab Units 01/11/22  0453 01/10/22  0454 01/09/22  1233 01/08/22  1635 01/08/22  1127 01/08/22  0037 01/08/22  0037   CALCIUM mg/dL 8.7 8.9  --  8.5 8.6   < > 8.8   ALBUMIN g/dL 3.10* 3.30*  --  3.30*  --   --  3.70   MAGNESIUM mg/dL 1.6* 1.9 1.8  --  2.3   < > 1.3*    < > = values in this interval not displayed.     Results from last 7 days   Lab Units 01/10/22  0454 01/08/22  1127 01/08/22  0037   PROCALCITONIN ng/mL 0.05 0.05 0.07   LACTATE mmol/L  --   --  1.1     COVID19   Date Value Ref Range Status   01/08/2022 Detected (C) Not Detected - Ref. Range Final   01/08/2022 Detected (C) Not Detected - Ref. Range Final     No results found for: HGBA1C, POCGLU    Adult Transthoracic Echo Complete W/ Cont if Necessary Per Protocol  · Calculated left ventricular EF = 40% Estimated left ventricular EF was   in agreement with the calculated left ventricular EF. Left ventricular   systolic function is moderately decreased.  · The following left ventricular wall segments are hypokinetic: mid   inferolateral, mid inferior, basal inferoseptal, basal inferior and basal   inferoseptal.  · Left ventricular diastolic function is consistent with (grade I)   impaired relaxation.  · There is moderate calcification of the aortic  valve.  · Aortic valve maximum pressure gradient is 6.3 mmHg. Aortic valve mean   pressure gradient is 3.4 mmHg.  · Mild tricuspid valve regurgitation is present.  · Estimated right ventricular systolic pressure from tricuspid   regurgitation is normal (<35 mmHg).  · There is a circumferential pericardial effusion.       Scheduled Medications  amLODIPine, 5 mg, Oral, Daily  aspirin, 81 mg, Oral, Daily  atorvastatin, 40 mg, Oral, Nightly  budesonide-formoterol, 2 puff, Inhalation, BID - RT  carvedilol, 25 mg, Oral, BID With Meals  DULoxetine, 30 mg, Oral, Daily  enoxaparin, 30 mg, Subcutaneous, Q24H  folic acid, 1 mg, Oral, Daily  guaiFENesin, 600 mg, Oral, Q12H  hydrocortisone, 25 mg, Rectal, BID  iopamidol, 100 mL, Intravenous, Once in imaging  latanoprost, 1 drop, Both Eyes, Nightly  magnesium sulfate, 2 g, Intravenous, Once  pantoprazole, 40 mg, Oral, Daily  calcium polycarbophil, 625 mg, Oral, BID  sodium chloride, 10 mL, Intravenous, Q12H  timolol, 1 drop, Both Eyes, Daily  traZODone, 50 mg, Oral, Nightly  vancomycin, 750 mg, Intravenous, Q12H    Infusions  Pharmacy to dose vancomycin,     Diet  Diet Regular       Assessment/Plan     Active Hospital Problems    Diagnosis  POA   • **Pneumonia of right lower lobe due to methicillin resistant Staphylococcus aureus (MRSA) ([facility]) [J15.212]  Yes   • Chronic systolic CHF (congestive heart failure) ([facility]) [I50.22]  Yes   • Occult GI bleeding [R19.5]  Yes   • Hypokalemia [E87.6]  Yes   • Hypomagnesemia [E83.42]  Yes   • Essential hypertension [I10]  Yes   • GERD without esophagitis [K21.9]  Yes   • History of cervical cancer [Z85.41]  Not Applicable   • HLD (hyperlipidemia) [E78.5]  Yes      Resolved Hospital Problems   No resolved problems to display.       89 y.o. female with Pneumonia of right lower lobe due to methicillin resistant Staphylococcus aureus (MRSA) ([facility]).    1.  Lower abdominal pain/diarrhea/questionable melena/urinary retention by CT scan.    Continues  with abdominal pain.  Benign GI examination.  Urinary retention has resolved clinically.   Placed on urinary retention protocol.  Stool for C. difficile/stool PCR are negative.  Positive Hemoccult stool.  Lipase is normal.  CT scan of the abdomen revealed no acute intra-abdominal process. Patient is too sick to undergo endoscopy at this time.  GI says her abd pain is chronic and not far from her baseline, and her Hgb is stable. They recommend f/u with Dr. Fernández as outpt. Continue Protonix. Decrease polycarbophil to once daily given her BMs in bed overnight.  2.  MRSA Pneumonia/Bacteremia (on admission felt to be COViD with possible superimposed bacterial infection)/leukocytosis.  Patient was out of the window for remdesivir treatment. Inflammatory markers falling. S/p tx with Decadron/Rocephin/Zithromax (see below). Negative Legionella and Strep urinary antigen. Sputum culture now growing MRSA. Blood culture growing MRSA in one bottle. ID consulted. COViD tx and Rocephin stopped and IV Vanc started for what ID feels is MRSA PNA with bacteremia. Echo obtained to r/o endocarditis--no evidence of this noted. Negative respiratory PCR panel except for COViD. Please note that her leukocytosis could also be secondary to steroids. WBC is normalized this AM. DDimer slightly elevated and pt had PNA a couple months ago, CTA chest pending.   3.  Heme+ Anemia.  Hemoglobin stable around baseline.  Will monitor. See #1.  4. Hypomagnesemia/hypokalemia/hyponatremia.  Hyponatremia is secondary to dehydration which has improved after IV NS.  Stable today in low 130s. Continue K+ protocol.  Mg++ 1.6 this AM, will give 2g IV. Normal renal function.   5.  Hypertension.  No evidence of angina or congestive heart failure.  BPs acceptable. Continue Norvasc and Coreg and monitor.  Patient is slightly tachycardic at times.    6.  Hyperlipidemia.  Continue Lipitor.  7.  Chronic systolic CHF and pericardial effusion. EF only 40% with LV  hypokinesis and pericardial effusion. Await Card eval and recs today.    During all interaction with pt proper PPE was utilized (gown, gloves, mask, goggles, and face shield) and was donned/doffed according to recommendations. Hands were cleaned before and after interaction.    Lovenox 30 mg SC daily for DVT prophylaxis.  Full code.  Discussed with patient, family, nursing staff, CCP and care team on multidisciplinary rounds.  Anticipate discharge home with home health, timing yet to be determined.      Petr Raymundo MD  [facility]  01/11/22  10:16 EST

## 2022-01-11 NOTE — CONSULTS
Bo Adan   89 y.o.  female    LOS: 3 days   Patient Care Team:  Hernan Cruz MD as PCP - General (Family Medicine)      Subjective     Patient Complaints: soa, abd pain    History of Present Illness:     The patient is a 89 year old female who has seen Dr. Junior Cuevas at [facility] in the past for HTN and an abnormal Echo.  In 2021, the patient had a mechanical fall and fractured her femur.  Post operatively, according to the notes, her EKG shows inferior and septal q waves in v1-v3.  TTE at that time showed EF 48% with mild septal hypokinesis.  Additional past medical history includes: HTN,  Cervical ca s/p radiation, GERD, enteritis, dylipidemia and osteoarthritis.   She was diagnosed with COVID 19 on 12/28/2021, she started treatment with abx and steriods.  She has had her vaccines x3.      She initially presented to the ER on 1/8/2021 for SOA, chest soreness while coughing, sore throat and melana.  .  A CT of the abdomen/pelvis performed in the ED revealed consolidation within the right lower lobe, stable intra and extrahepatic biliary dilatation, and marked distention of the urinary bladder. She had been treated with abx. Currently ID is following for pna and bacteremia with 1/2 blood positive for MRSA.     Review of Systems:   All systems were reviewed and negative except for:  Respiratory: positive for  cough, productive and shortness of air    Medication Review:   Prior to Admission medications    Medication Sig Start Date End Date Taking? Authorizing Provider   acetaminophen (TYLENOL) 500 MG tablet Take 1,000 mg by mouth Every 6 (Six) Hours As Needed for Mild Pain .   Yes Provider, MD Tam   amLODIPine (NORVASC) 5 MG tablet Take 5 mg by mouth Daily.   Yes Provider, Historical, MD   aspirin 81 MG EC tablet Take 81 mg by mouth Daily.   Yes Provider, MD Tam   atorvastatin (LIPITOR) 40 MG tablet Take 40 mg by mouth Daily.   Yes Provider, MD Tam   carvedilol (COREG) 25 MG  tablet Take 1 tablet by mouth 2 (Two) Times a Day With Meals for 30 days. 5/18/21 1/8/22 Yes Jose Oquendo MD   DULoxetine (CYMBALTA) 20 MG capsule Take 30 mg by mouth Daily. Taken at evening    Yes Tam Sanders MD   folic acid (FOLVITE) 1 MG tablet Take 1 mg by mouth Daily.   Yes Tam Sanders MD   multivitamin with minerals (MULTIVITAMIN ADULT PO) Take 1 tablet by mouth Daily.   Yes Tam Sanders MD   pantoprazole (PROTONIX) 40 MG EC tablet Take 40 mg by mouth Daily.   Yes Tam Sanders MD   traMADol (ULTRAM) 50 MG tablet Take 50 mg by mouth Every 8 (Eight) Hours As Needed for Moderate Pain .   Yes Tam Sanders MD   vitamin B-12 (CYANOCOBALAMIN) 1000 MCG tablet Take 1,000 mcg by mouth Daily.   Yes Tam Sanders MD   estradiol (VAGIFEM) 10 MCG tablet vaginal tablet Insert 1 tablet into the vagina 2 (Two) Times a Week.    Tam Sanders MD   hydrocortisone (ANUSOL-HC) 25 MG suppository Insert 1 suppository into the rectum 2 (Two) Times a Day. 11/3/21   Sadie Roman APRN   latanoprost (XALATAN) 0.005 % ophthalmic solution 1 drop Every Night.    Tam Sanders MD   Melatonin 1 MG/4ML liquid 5 mg. 4/25/21   Tam Sanders MD   timolol (TIMOPTIC) 0.25 % ophthalmic solution 1 drop 2 (Two) Times a Day.    Tam Sanders MD   traZODone (DESYREL) 50 MG tablet Take 50 mg by mouth Every Night.    Tam Sanders MD         History reviewed. No pertinent family history.  Social History     Socioeconomic History   • Marital status:    Tobacco Use   • Smoking status: Never Smoker   • Smokeless tobacco: Never Used   Substance and Sexual Activity   • Alcohol use: Not Currently   • Drug use: Not Currently   • Sexual activity: Defer     Objective   Past Surgical History:   Procedure Laterality Date   • CHOLECYSTECTOMY     • COLONOSCOPY     • ENDOSCOPY     • FRACTURE SURGERY      feb 23 2021     Past Medical History:   Diagnosis Date    • Arthritis    • Cancer (HCC)     cervical CA 2018 finished radiation   • Elevated cholesterol    • Enteritis    • GERD (gastroesophageal reflux disease)    • Hypertension        Vital Sign Min/Max for last 24 hours  Temp  Min: 97.8 °F (36.6 °C)  Max: 98.6 °F (37 °C)   BP  Min: 141/83  Max: 155/91    Pulse  Min: 98  Max: 112         01/10/22  0500 01/10/22  1406   Weight: 37.3 kg (82 lb 3.2 oz) 37.2 kg (82 lb)        Physical Exam:      General Appearance:    No acute distress                                Head:    Normocephalic, without obvious abnormality, atraumatic   Neck:   No adenopathy, supple, trachea midline, no carotid bruit, no JVD   Lungs:    diminished on R, even and unlabored, occasional cough     Heart:    Regular rhythm and normal rate, normal S1 and S2,            No murmur, no gallop, no rub, no click   Abdomen:     Normal bowel sounds, no masses, no organomegaly, soft   tender, non-distended,    Extremities:   No edema. Moves all extremities well, no cyanosis, no erythema   Pulses:   Pulses palpable and equal bilaterally   Skin:   No bleeding, bruising or rash   Neurologic:   Alert and orients x3,  no obvious focal deficits          Results Review:     I reviewed the patient's new clinical results.    Sodium   Date Value Ref Range Status   01/11/2022 132 (L) 136 - 145 mmol/L Final   01/10/2022 133 (L) 136 - 145 mmol/L Final   01/08/2022 135 (L) 136 - 145 mmol/L Final   01/08/2022 135 (L) 136 - 145 mmol/L Final     Potassium   Date Value Ref Range Status   01/11/2022 3.3 (L) 3.5 - 5.2 mmol/L Final   01/10/2022 4.4 3.5 - 5.2 mmol/L Final     Comment:     Slight hemolysis detected by analyzer. Results may be affected.   01/08/2022 4.9 3.5 - 5.2 mmol/L Final   01/08/2022 3.8 3.5 - 5.2 mmol/L Final     Comment:     Slight hemolysis detected by analyzer. Results may be affected.     Chloride   Date Value Ref Range Status   01/11/2022 94 (L) 98 - 107 mmol/L Final   01/10/2022 94 (L) 98 - 107 mmol/L  Final   01/08/2022 97 (L) 98 - 107 mmol/L Final   01/08/2022 97 (L) 98 - 107 mmol/L Final     No results found for: PLASMABICARB  BUN   Date Value Ref Range Status   01/11/2022 10 8 - 23 mg/dL Final   01/10/2022 10 8 - 23 mg/dL Final   01/08/2022 9 8 - 23 mg/dL Final   01/08/2022 7 (L) 8 - 23 mg/dL Final     Creatinine   Date Value Ref Range Status   01/11/2022 0.32 (L) 0.57 - 1.00 mg/dL Final   01/10/2022 0.38 (L) 0.57 - 1.00 mg/dL Final   01/08/2022 0.44 (L) 0.57 - 1.00 mg/dL Final   01/08/2022 0.35 (L) 0.57 - 1.00 mg/dL Final     Calcium   Date Value Ref Range Status   01/11/2022 8.7 8.2 - 9.6 mg/dL Final   01/10/2022 8.9 8.2 - 9.6 mg/dL Final   01/08/2022 8.5 8.2 - 9.6 mg/dL Final   01/08/2022 8.6 8.2 - 9.6 mg/dL Final     Magnesium   Date Value Ref Range Status   01/11/2022 1.6 (L) 1.7 - 2.3 mg/dL Final   01/10/2022 1.9 1.7 - 2.3 mg/dL Final   01/09/2022 1.8 1.7 - 2.3 mg/dL Final   01/08/2022 2.3 1.7 - 2.3 mg/dL Final       Results from last 7 days   Lab Units 01/11/22  0453   WBC 10*3/mm3 10.33   HEMOGLOBIN g/dL 11.7*   HEMATOCRIT % 35.0   PLATELETS 10*3/mm3 460*     No results found for: CHOL  Lab Results   Component Value Date    HDL 65 04/05/2019    HDL 55 10/02/2018    HDL 50 03/28/2018     Lab Results   Component Value Date    LDL 84 04/05/2019    LDL 92 10/02/2018    LDL 98 03/28/2018     Lab Results   Component Value Date    TRIG 108 04/05/2019    TRIG 224 (H) 10/02/2018    TRIG 138 03/28/2018         Results from last 7 days   Lab Units 01/08/22  0037   TROPONIN T ng/mL <0.010               Echo 1/8/2022- read by Dr. Sorenson  Interpretation Summary    · Calculated left ventricular EF = 40% Estimated left ventricular EF was in agreement with the calculated left ventricular EF. Left ventricular systolic function is moderately decreased.  · The following left ventricular wall segments are hypokinetic: mid inferolateral, mid inferior, basal inferoseptal, basal inferior and basal inferoseptal.  · Left  ventricular diastolic function is consistent with (grade I) impaired relaxation.  · There is moderate calcification of the aortic valve.  · Aortic valve maximum pressure gradient is 6.3 mmHg. Aortic valve mean pressure gradient is 3.4 mmHg.  · Mild tricuspid valve regurgitation is present.  · Estimated right ventricular systolic pressure from tricuspid regurgitation is normal (<35 mmHg).  · There is a circumferential pericardial effusion.           Assessment/Plan   1. Chronic systolic CHF with EF 40% per Echo on 1/8/22      A. Echo 1/8/22: Calculated left ventricular EF = 40% Estimated left ventricular EF was in agreement with the calculated left ventricular EF. Left ventricular systolic function is moderately decreased. The following left ventricular wall segments are hypokinetic: mid inferolateral, mid inferior, basal inferoseptal, basal inferior and basal inferoseptal.      B. Echo 2021: TTE at that time showed EF 48% with mild septal hypokinesis.      2. MRSA PNA and bacteremia  3. COVID 19  4. HTN  5. Acute on chronic abdominal pain  6. Heme positive stool with mild anemia, HgB stable  7.  HLD  8. ACE allergy     Echo reviewed by Dr Au, essentially unchanged since previous Echo.  Small effusion noted, non concerning.  I attempted to call daughter, but no answer. Dr. Au to make any necessary changes to note, assessment and further recommendations.     Scribed by:   Naya Machado, APRN  01/11/22  08:21 BRANDON Au M.D.   Reviewed our cardiology group Nurse Practitioner's note.    Pt interviewed and examined.   Findings verified.   Reviewed and agree with corrections or modifications of history, physical, and plans as indicated:     Discussed with Dr. Coon today regarding the effusion, and other thoracic findings (CT pending).  Do not think this is an infectious effusion, and the effusion is small.  Also, there are no Doppler findings to suggest tamponade physiology.    Echo unchanged  compared with prior.  We will continue to manage medically.  She does have right-sided rhonchi, moderate, and AF RVR with minimal exertion.      At rest heart rates have been , but as she was sitting eating and as I spoke with her, heart rate was 137.  BP has varied between 109/85 and 154/91 during the past 24 hours.  Overall blood pressures are greater than 130, and we will try addition of low-dose diltiazem once in the morning only, since she is a small person, older, probably with suboptimal metabolism.  This should give enough heart rate and blood pressure control/protection.      Williams Au MD    Please note that portions of this note were completed with a voice recognition program.

## 2022-01-11 NOTE — PLAN OF CARE
Goal Outcome Evaluation:  Plan of Care Reviewed With: patient        Progress: improving  Outcome Summary: VSS. no acute changes over night. Up to bedside commode, having formed bowel movements. Turns with reminders. Bladder scans q8; no c/o pain or soa. Will ctm

## 2022-01-12 LAB
ALBUMIN SERPL-MCNC: 3.1 G/DL (ref 3.5–5.2)
ALBUMIN/GLOB SERPL: 1.1 G/DL
ALP SERPL-CCNC: 67 U/L (ref 39–117)
ALT SERPL W P-5'-P-CCNC: 13 U/L (ref 1–33)
ANION GAP SERPL CALCULATED.3IONS-SCNC: 9.4 MMOL/L (ref 5–15)
AST SERPL-CCNC: 14 U/L (ref 1–32)
BACTERIA SPEC AEROBE CULT: ABNORMAL
BACTERIA SPEC RESP CULT: ABNORMAL
BACTERIA SPEC RESP CULT: ABNORMAL
BASOPHILS # BLD AUTO: 0.03 10*3/MM3 (ref 0–0.2)
BASOPHILS NFR BLD AUTO: 0.3 % (ref 0–1.5)
BILIRUB SERPL-MCNC: 0.4 MG/DL (ref 0–1.2)
BUN SERPL-MCNC: 8 MG/DL (ref 8–23)
BUN/CREAT SERPL: 22.2 (ref 7–25)
CALCIUM SPEC-SCNC: 8.7 MG/DL (ref 8.2–9.6)
CHLORIDE SERPL-SCNC: 95 MMOL/L (ref 98–107)
CO2 SERPL-SCNC: 27.6 MMOL/L (ref 22–29)
CREAT SERPL-MCNC: 0.36 MG/DL (ref 0.57–1)
CRP SERPL-MCNC: 0.84 MG/DL (ref 0–0.5)
DEPRECATED RDW RBC AUTO: 46.9 FL (ref 37–54)
EOSINOPHIL # BLD AUTO: 0.04 10*3/MM3 (ref 0–0.4)
EOSINOPHIL NFR BLD AUTO: 0.4 % (ref 0.3–6.2)
ERYTHROCYTE [DISTWIDTH] IN BLOOD BY AUTOMATED COUNT: 13.4 % (ref 12.3–15.4)
FERRITIN SERPL-MCNC: 561 NG/ML (ref 13–150)
GFR SERPL CREATININE-BSD FRML MDRD: >150 ML/MIN/1.73
GLOBULIN UR ELPH-MCNC: 2.7 GM/DL
GLUCOSE SERPL-MCNC: 82 MG/DL (ref 65–99)
GRAM STN SPEC: ABNORMAL
HCT VFR BLD AUTO: 36.1 % (ref 34–46.6)
HGB BLD-MCNC: 12.2 G/DL (ref 12–15.9)
IMM GRANULOCYTES # BLD AUTO: 0.19 10*3/MM3 (ref 0–0.05)
IMM GRANULOCYTES NFR BLD AUTO: 1.8 % (ref 0–0.5)
ISOLATED FROM: ABNORMAL
LYMPHOCYTES # BLD AUTO: 0.38 10*3/MM3 (ref 0.7–3.1)
LYMPHOCYTES NFR BLD AUTO: 3.5 % (ref 19.6–45.3)
MAGNESIUM SERPL-MCNC: 1.8 MG/DL (ref 1.7–2.3)
MCH RBC QN AUTO: 32.8 PG (ref 26.6–33)
MCHC RBC AUTO-ENTMCNC: 33.8 G/DL (ref 31.5–35.7)
MCV RBC AUTO: 97 FL (ref 79–97)
MONOCYTES # BLD AUTO: 1.03 10*3/MM3 (ref 0.1–0.9)
MONOCYTES NFR BLD AUTO: 9.5 % (ref 5–12)
NEUTROPHILS NFR BLD AUTO: 84.5 % (ref 42.7–76)
NEUTROPHILS NFR BLD AUTO: 9.13 10*3/MM3 (ref 1.7–7)
NRBC BLD AUTO-RTO: 0 /100 WBC (ref 0–0.2)
PLATELET # BLD AUTO: 447 10*3/MM3 (ref 140–450)
PMV BLD AUTO: 8.7 FL (ref 6–12)
POTASSIUM SERPL-SCNC: 3.9 MMOL/L (ref 3.5–5.2)
PROCALCITONIN SERPL-MCNC: 0.06 NG/ML (ref 0–0.25)
PROT SERPL-MCNC: 5.8 G/DL (ref 6–8.5)
RBC # BLD AUTO: 3.72 10*6/MM3 (ref 3.77–5.28)
SODIUM SERPL-SCNC: 132 MMOL/L (ref 136–145)
VANCOMYCIN TROUGH SERPL-MCNC: 12.6 MCG/ML (ref 5–20)
WBC NRBC COR # BLD: 10.8 10*3/MM3 (ref 3.4–10.8)

## 2022-01-12 PROCEDURE — 94799 UNLISTED PULMONARY SVC/PX: CPT

## 2022-01-12 PROCEDURE — 84145 PROCALCITONIN (PCT): CPT | Performed by: NURSE PRACTITIONER

## 2022-01-12 PROCEDURE — 80202 ASSAY OF VANCOMYCIN: CPT | Performed by: NURSE PRACTITIONER

## 2022-01-12 PROCEDURE — 83735 ASSAY OF MAGNESIUM: CPT | Performed by: INTERNAL MEDICINE

## 2022-01-12 PROCEDURE — 97530 THERAPEUTIC ACTIVITIES: CPT

## 2022-01-12 PROCEDURE — 80053 COMPREHEN METABOLIC PANEL: CPT | Performed by: INTERNAL MEDICINE

## 2022-01-12 PROCEDURE — 94760 N-INVAS EAR/PLS OXIMETRY 1: CPT

## 2022-01-12 PROCEDURE — 85025 COMPLETE CBC W/AUTO DIFF WBC: CPT | Performed by: INTERNAL MEDICINE

## 2022-01-12 PROCEDURE — 25010000002 VANCOMYCIN 10 G RECONSTITUTED SOLUTION: Performed by: NURSE PRACTITIONER

## 2022-01-12 PROCEDURE — 99232 SBSQ HOSP IP/OBS MODERATE 35: CPT | Performed by: INTERNAL MEDICINE

## 2022-01-12 PROCEDURE — 94761 N-INVAS EAR/PLS OXIMETRY MLT: CPT

## 2022-01-12 PROCEDURE — 86140 C-REACTIVE PROTEIN: CPT | Performed by: INTERNAL MEDICINE

## 2022-01-12 PROCEDURE — 97116 GAIT TRAINING THERAPY: CPT

## 2022-01-12 PROCEDURE — 25010000002 VANCOMYCIN 750 MG RECONSTITUTED SOLUTION: Performed by: NURSE PRACTITIONER

## 2022-01-12 PROCEDURE — 82728 ASSAY OF FERRITIN: CPT | Performed by: INTERNAL MEDICINE

## 2022-01-12 PROCEDURE — 25010000002 ENOXAPARIN PER 10 MG: Performed by: INTERNAL MEDICINE

## 2022-01-12 RX ORDER — L.ACID,PARA/B.BIFIDUM/S.THERM 8B CELL
1 CAPSULE ORAL DAILY
Status: DISCONTINUED | OUTPATIENT
Start: 2022-01-12 | End: 2022-01-13 | Stop reason: HOSPADM

## 2022-01-12 RX ADMIN — GUAIFENESIN 600 MG: 600 TABLET, EXTENDED RELEASE ORAL at 21:05

## 2022-01-12 RX ADMIN — LATANOPROST 1 DROP: 50 SOLUTION OPHTHALMIC at 21:05

## 2022-01-12 RX ADMIN — GUAIFENESIN 600 MG: 600 TABLET, EXTENDED RELEASE ORAL at 09:15

## 2022-01-12 RX ADMIN — SODIUM CHLORIDE, PRESERVATIVE FREE 10 ML: 5 INJECTION INTRAVENOUS at 09:15

## 2022-01-12 RX ADMIN — VANCOMYCIN HYDROCHLORIDE 750 MG: 10 INJECTION, POWDER, LYOPHILIZED, FOR SOLUTION INTRAVENOUS at 23:56

## 2022-01-12 RX ADMIN — ACETAMINOPHEN 650 MG: 325 TABLET, FILM COATED ORAL at 14:18

## 2022-01-12 RX ADMIN — BUDESONIDE AND FORMOTEROL FUMARATE DIHYDRATE 2 PUFF: 160; 4.5 AEROSOL RESPIRATORY (INHALATION) at 21:24

## 2022-01-12 RX ADMIN — DILTIAZEM HYDROCHLORIDE 30 MG: 30 TABLET, FILM COATED ORAL at 09:15

## 2022-01-12 RX ADMIN — VANCOMYCIN HYDROCHLORIDE 750 MG: 10 INJECTION, POWDER, LYOPHILIZED, FOR SOLUTION INTRAVENOUS at 12:50

## 2022-01-12 RX ADMIN — CARVEDILOL 25 MG: 25 TABLET, FILM COATED ORAL at 17:54

## 2022-01-12 RX ADMIN — DULOXETINE HYDROCHLORIDE 30 MG: 30 CAPSULE, DELAYED RELEASE ORAL at 09:15

## 2022-01-12 RX ADMIN — TRAZODONE HYDROCHLORIDE 50 MG: 50 TABLET ORAL at 21:05

## 2022-01-12 RX ADMIN — HYDROCORTISONE ACETATE 25 MG: 25 SUPPOSITORY RECTAL at 21:05

## 2022-01-12 RX ADMIN — VANCOMYCIN HYDROCHLORIDE 750 MG: 10 INJECTION, POWDER, LYOPHILIZED, FOR SOLUTION INTRAVENOUS at 00:13

## 2022-01-12 RX ADMIN — ATORVASTATIN CALCIUM 40 MG: 20 TABLET, FILM COATED ORAL at 21:05

## 2022-01-12 RX ADMIN — ASPIRIN 81 MG: 81 TABLET, COATED ORAL at 09:15

## 2022-01-12 RX ADMIN — AMLODIPINE BESYLATE 5 MG: 5 TABLET ORAL at 09:15

## 2022-01-12 RX ADMIN — SODIUM CHLORIDE, PRESERVATIVE FREE 10 ML: 5 INJECTION INTRAVENOUS at 21:05

## 2022-01-12 RX ADMIN — CARVEDILOL 25 MG: 25 TABLET, FILM COATED ORAL at 09:15

## 2022-01-12 RX ADMIN — ENOXAPARIN SODIUM 30 MG: 30 INJECTION SUBCUTANEOUS at 09:15

## 2022-01-12 RX ADMIN — FOLIC ACID 1 MG: 1 TABLET ORAL at 09:15

## 2022-01-12 RX ADMIN — TIMOLOL MALEATE 1 DROP: 2.5 SOLUTION OPHTHALMIC at 12:58

## 2022-01-12 RX ADMIN — PANTOPRAZOLE SODIUM 40 MG: 40 TABLET, DELAYED RELEASE ORAL at 09:15

## 2022-01-12 RX ADMIN — Medication 1 CAPSULE: at 17:54

## 2022-01-12 RX ADMIN — BUDESONIDE AND FORMOTEROL FUMARATE DIHYDRATE 2 PUFF: 160; 4.5 AEROSOL RESPIRATORY (INHALATION) at 08:39

## 2022-01-12 NOTE — PROGRESS NOTES
Continued Stay Note  [facility]     Patient Name: Bo Adan  MRN: [id number]  Today's Date: 1/12/2022    Admit Date: 1/8/2022     Discharge Plan     Row Name 01/12/22 1403       Plan    Plan Home with family and private pay assist and HH (referral are pending)    Plan Comments Spoke with natasha Hernandez and plan does remain for the patient to return home with her spouse with private pay assist and home health.  She states she prefers a HH agency that accepts her insurance and has staff.  Referrals are pending. .......................Elana Allison RN               Discharge Codes    No documentation.               Expected Discharge Date and Time     Expected Discharge Date Expected Discharge Time    Jan 13, 2022             Elana Allison RN

## 2022-01-12 NOTE — PROGRESS NOTES
"[facility] Clinical Pharmacy Services: Vancomycin Monitoring Note    Bo Adan is a 89 y.o. female who is on day 3/5 of pharmacy to dose vancomycin for MRSA pneumonia .    Previous Vancomycin Dose:   750 mg IV every  12  Updated Cultures and Sensitivities: 1/8 blood cultures 1/2 MRSA 1/8 blood cultures no growth  1/9 sputum w/ MRSA     Lab Results   Component Value Date    VANCOTROUGH 12.60 01/12/2022    VANCORANDOM 7.70 01/10/2022       Vitals/Labs  Ht: 152.4 cm (60\"); Wt: 37.2 kg (82 lb 0.2 oz)   Temp Readings from Last 1 Encounters:   01/12/22 96 °F (35.6 °C) (Oral)     Estimated Creatinine Clearance: 26.9 mL/min (A) (by C-G formula based on SCr of 0.36 mg/dL (L)).        Results from last 7 days   Lab Units 01/12/22  0520 01/11/22  0453 01/10/22  0454   CREATININE mg/dL 0.36* 0.32* 0.38*   WBC 10*3/mm3 10.80 10.33 11.76*     Assessment/Plan  Vanc level and AUC at goal, renal fxn is stable   Current Vancomycin Dose: 750 mg IV q12h for    Next Level Date and Time: Vanc Trough on 1/12 at 1100  We will continue to monitor patient changes and renal function     Thank you for involving pharmacy in this patient's care. Please contact pharmacy with any questions or concerns.       Kaci Patel [facility]  Clinical Pharmacist            "

## 2022-01-12 NOTE — PROGRESS NOTES
PA for Zyvox processed and approved w/ insurance. However, copay is 100$. Cash price of med at [facility] for #14 tabs is only 34$     Kaci Patel, PharmD, BCPS  1/12/2022 14:29 EST

## 2022-01-12 NOTE — THERAPY TREATMENT NOTE
Patient Name: Bo Adan  : 10/24/1930    MRN: [id number]                              Today's Date: 2022       Admit Date: 2022    Visit Dx:     ICD-10-CM ICD-9-CM   1. Pneumonia of right lung due to infectious organism, unspecified part of lung  J18.9 483.8   2. Leukocytosis, unspecified type  D72.829 288.60   3. Hypomagnesemia  E83.42 275.2   4. COVID-19 virus infection  U07.1 079.89   5. Abdominal pain, unspecified abdominal location  R10.9 789.00   6. Hypertension, unspecified type  I10 401.9   7. Hyperlipidemia, unspecified hyperlipidemia type  E78.5 272.4     Patient Active Problem List   Diagnosis   • Anemia   • Diarrhea of presumed infectious origin   • Essential hypertension   • GERD without esophagitis   • History of cervical cancer   • History of pancreatitis   • HLD (hyperlipidemia)   • Chest pain   • Diarrhea   • Enteritis   • Hypokalemia   • Hypomagnesemia   • Occult GI bleeding   • Pneumonia of right lower lobe due to methicillin resistant Staphylococcus aureus (MRSA) (HCC)   • Chronic systolic CHF (congestive heart failure) (HCC)   • Severe malnutrition (CMS/HCC)     Past Medical History:   Diagnosis Date   • Arthritis    • Cancer (HCC)     cervical CA 2018 finished radiation   • Chronic systolic CHF (congestive heart failure) (HCC) 2022   • Elevated cholesterol    • Enteritis    • GERD (gastroesophageal reflux disease)    • Hypertension      Past Surgical History:   Procedure Laterality Date   • CHOLECYSTECTOMY     • COLONOSCOPY     • ENDOSCOPY     • FRACTURE SURGERY      2021      General Information     Row Name 22 1146          Physical Therapy Time and Intention    Document Type therapy note (daily note)  -CF     Mode of Treatment physical therapy; individual therapy  -CF     Row Name 22 1146          General Information    Patient Profile Reviewed yes  -CF     Existing Precautions/Restrictions fall  -CF     Row Name 22 1146          Cognition     Orientation Status (Cognition) oriented x 4  -CF     Row Name 01/12/22 1146          Safety Issues, Functional Mobility    Impairments Affecting Function (Mobility) balance; endurance/activity tolerance; strength  -CF           User Key  (r) = Recorded By, (t) = Taken By, (c) = Cosigned By    Initials Name Provider Type    CF Miladis Rider PT Physical Therapist               Mobility     Row Name 01/12/22 1146          Bed Mobility    Comment (Bed Mobility) UIC  -CF     Row Name 01/12/22 1146          Transfers    Comment (Transfers) Assisted to [facility] after 2nd trial of gait training  -CF     Row Name 01/12/22 1146          Bed-Chair Transfer    Bed-Chair Murphy (Transfers) verbal cues; contact guard  -CF     Assistive Device (Bed-Chair Transfers) walker, front-wheeled  -CF     Row Name 01/12/22 1146          Sit-Stand Transfer    Sit-Stand Murphy (Transfers) contact guard; 1 person assist; verbal cues  -CF     Assistive Device (Sit-Stand Transfers) walker, front-wheeled  -CF     Row Name 01/12/22 1146          Gait/Stairs (Locomotion)    Murphy Level (Gait) contact guard; 1 person assist; verbal cues  -CF     Assistive Device (Gait) walker, front-wheeled  -CF     Distance in Feet (Gait) 35' x2 reps  -CF     Deviations/Abnormal Patterns (Gait) lawson decreased; festinating/shuffling; stride length decreased  -CF     Bilateral Gait Deviations forward flexed posture  -CF     Comment (Gait/Stairs) Seated rest in between trials, slow pace. States legs feel wobbly and needing to rest  -CF           User Key  (r) = Recorded By, (t) = Taken By, (c) = Cosigned By    Initials Name Provider Type    Miladis Oswald PT Physical Therapist               Obj/Interventions     Row Name 01/12/22 1153          Balance    Balance Assessment standing dynamic balance  -CF     Dynamic Standing Balance mild impairment; supported  -CF           User Key  (r) = Recorded By, (t) = Taken By, (c) = Cosigned By     Initials Name Provider Type    CF Miladis Rider, TORO Physical Therapist               Goals/Plan    No documentation.                Clinical Impression     Row Name 01/12/22 1154          Pain Scale: Numbers Pre/Post-Treatment    Pretreatment Pain Rating 0/10 - no pain  -CF     Posttreatment Pain Rating 0/10 - no pain  -CF     Row Name 01/12/22 1154          Plan of Care Review    Plan of Care Reviewed With patient  -CF     Outcome Summary Pt seen for PT treatment this morning. Pt able to increase ambulation distance to 35' x2 reps with seated rest break in between. Pt fatigues quickly but had no overt loss of balance. Assist with brief management and pericare following use of BSC. PT will continue to follow and progress as able.  -CF     Row Name 01/12/22 1154          Vital Signs    Intratreatment Heart Rate (beats/min) 138  -CF     Pre SpO2 (%) 94  -CF     O2 Delivery Pre Treatment room air  -CF     O2 Delivery Intra Treatment room air  -CF     O2 Delivery Post Treatment room air  -CF     Row Name 01/12/22 1154          Positioning and Restraints    Pre-Treatment Position sitting in chair/recliner  -CF     Post Treatment Position chair  -CF     In Chair reclined; encouraged to call for assist; exit alarm on; call light within reach; notified nsg  -CF           User Key  (r) = Recorded By, (t) = Taken By, (c) = Cosigned By    Initials Name Provider Type    CF Miladis Rider PT Physical Therapist               Outcome Measures     Row Name 01/12/22 9349          How much help from another person do you currently need...    Turning from your back to your side while in flat bed without using bedrails? 3  -CF     Moving from lying on back to sitting on the side of a flat bed without bedrails? 3  -CF     Moving to and from a bed to a chair (including a wheelchair)? 3  -CF     Standing up from a chair using your arms (e.g., wheelchair, bedside chair)? 3  -CF     Climbing 3-5 steps with a railing? 3  -CF     To  walk in hospital room? 3  -CF     AM-PAC 6 Clicks Score (PT) 18  -CF     Row Name 01/12/22 1147          Functional Assessment    Outcome Measure Options AM-PAC 6 Clicks Basic Mobility (PT)  -CF           User Key  (r) = Recorded By, (t) = Taken By, (c) = Cosigned By    Initials Name Provider Type    CF Miladis Rider, PT Physical Therapist                             Physical Therapy Education                 Title: PT OT SLP Therapies (Done)     Topic: Physical Therapy (Done)     Point: Mobility training (Done)     Learning Progress Summary           Patient Acceptance, E, VU,NR by CF at 1/12/2022 1148    Acceptance, E, VU by CL at 1/9/2022 1822    Acceptance, E, VU by SR at 1/9/2022 1039                   Point: Home exercise program (Done)     Learning Progress Summary           Patient Acceptance, E, VU,NR by CF at 1/12/2022 1148    Acceptance, E, VU by CL at 1/9/2022 1822    Acceptance, E, VU by SR at 1/9/2022 1039                   Point: Body mechanics (Done)     Learning Progress Summary           Patient Acceptance, E, VU,NR by CF at 1/12/2022 1148    Acceptance, E, VU by CL at 1/9/2022 1822    Acceptance, E, VU by SR at 1/9/2022 1039                   Point: Precautions (Done)     Learning Progress Summary           Patient Acceptance, E, VU,NR by CF at 1/12/2022 1148    Acceptance, E, VU by CL at 1/9/2022 1822    Acceptance, E, VU by SR at 1/9/2022 1039                               User Key     Initials Effective Dates Name Provider Type Discipline     06/16/21 -  Carson Gil, RN Registered Nurse Nurse     06/16/21 -  Miladis Rider, TORO Physical Therapist PT    SR 02/08/21 -  Christi Au Physical Therapist PT              PT Recommendation and Plan     Plan of Care Reviewed With: patient  Outcome Summary: Pt seen for PT treatment this morning. Pt able to increase ambulation distance to 35' x2 reps with seated rest break in between. Pt fatigues quickly but had no overt loss of balance. Assist  with brief management and pericare following use of BSC. PT will continue to follow and progress as able.     Time Calculation:    PT Charges     Row Name 01/12/22 1146             Time Calculation    Start Time 1137  -CF      Stop Time 1203  -CF      Time Calculation (min) 26 min  -CF      PT Received On 01/12/22  -CF      PT - Next Appointment 01/13/22  -CF            User Key  (r) = Recorded By, (t) = Taken By, (c) = Cosigned By    Initials Name Provider Type    CF Miladis Rider, PT Physical Therapist              Therapy Charges for Today     Code Description Service Date Service Provider Modifiers Qty    31955705867 HC PT THERAPEUTIC ACT EA 15 MIN 1/12/2022 Miladis Rider, PT GP 1    95771229437 HC GAIT TRAINING EA 15 MIN 1/12/2022 Miladis Rider, PT GP 1          PT G-Codes  Outcome Measure Options: AM-PAC 6 Clicks Basic Mobility (PT)  AM-PAC 6 Clicks Score (PT): 18    Miladis Rider PT  1/12/2022

## 2022-01-12 NOTE — PLAN OF CARE
Goal Outcome Evaluation:  Plan of Care Reviewed With: patient           Outcome Summary: Pt seen for PT treatment this morning. Pt able to increase ambulation distance to 35' x2 reps with seated rest break in between. Pt fatigues quickly but had no overt loss of balance. Assist with brief management and pericare following use of BSC. PT will continue to follow and progress as able.    Patient was not wearing a face mask during this therapy encounter. Therapist used appropriate personal protective equipment including gown, eye protection, mask and gloves.  Mask used was N95/duckbill. Appropriate PPE was worn during the entire therapy session. Hand hygiene was completed before and after therapy session. Patient is in enhanced droplet precautions.

## 2022-01-12 NOTE — PROGRESS NOTES
ID PROGRESS NOTE    CC: Follow-up sepsis    S:   Diarrhea worse today. No o2 requirement.  No fevers or chills or night sweats    O:  Physical Exam:  Temp:  [96 °F (35.6 °C)-97.7 °F (36.5 °C)] 96 °F (35.6 °C)  Heart Rate:  [] 117  Resp:  [14-18] 14  BP: (134-155)/() 148/110  Physical Exam  GENERAL: Awake and alert, sickly  HEENT: Oropharynx is clear. Hearing is grossly normal.   HEART: Tachycardic, regular. No peripheral edema.   LUNGS: Rhonchorous with normal respiratory effort.   GI: Soft, nontender, nondistended. No appreciable organomegaly.   SKIN: Warm and dry without cutaneous eruptions   PSYCHIATRIC: Appropriate mood, affect, insight, and judgment.       Diagnostics:    White count 10.8  PCT 0.06  Cr 0.36    TTE with ejection fraction of 40% multiple hypokinetic segments, grade 1 diastolic dysfunction, moderate calcification of the aortic valve, mild TR and circumferential pericardial effusion    1/8 blood cultures 1/2 MRSA   1/8 blood culture 2/2 no growth to date  1/9 respiratory culture 2+ MRSA    Assessment/Plan   1.  MRSA septicemia from right lower lobe MRSA pneumonia, likely secondary bacterial infection from #2  2.  COVID-19 infection, first tested positive on January 4  3.  Leukocytosis, resolved  4.  Calcification of aortic valve  5.  Pericardial effusion    Doing okay from my perspective.  White count normal.  Afebrile.  Repeat chest CT with no evidence of complications of pneumonia.  Actually looks a little bit better  Discussed with patient that generally with MRSA bacteremia prefer to treat with IV antibiotics but given desire to leave the hospital desired to not have home IV antibiotics as well as low suspicion for endovascular infection, she would prefer oral antibiotics.  I would recommend we keep her on vancomycin while inpatient.  At discharge she can be switched over to linezolid 600 mg p.o. every 12 x10 days.  There is a small theoretical risk of serotonin syndrome while on  Cymbalta, but we discussed signs and symptoms of this, when to seek medical attention she is willing to symptoms.  No objection to discharge when okay with others.  Margarito Coon MD  01/12/22

## 2022-01-12 NOTE — PROGRESS NOTES
Continued Stay Note  [facility]     Patient Name: Bo Adan  MRN: [id number]  Today's Date: 1/12/2022    Admit Date: 1/8/2022     Discharge Plan     Row Name 01/12/22 1525       Plan    Plan Home with family and private pay assist and [facility]    Plan Comments Spoke with patient and she was notified that [facility] has declined.  She prefers a referral for [facility].  Jacqueline accepts with [facility] and is following for dc...................Elana Allison RN    Row Name 01/12/22 3137       Plan    Plan Home with family and private pay assist and HH (referral are pending)    Plan Comments Spoke with natasha Hernandez and plan does remain for the patient to return home with her spouse with private pay assist and home health.  She states she prefers a HH agency that accepts her insurance and has staff.  Referrals are pending. .......................Elana Allison RN               Discharge Codes    No documentation.               Expected Discharge Date and Time     Expected Discharge Date Expected Discharge Time    Jan 13, 2022             Elana Allison RN

## 2022-01-12 NOTE — PROGRESS NOTES
Bo Adan   89 y.o.  female    LOS: 4 days   Patient Care Team:  Hernan Cruz MD as PCP - General (Family Medicine)      Subjective     Interval History:     Patient Complaints: Talked to the patient on the phone.  She reports she is feeling better.  She does not have complaints of any significant shortness of air.  She denies any chest pain.    Review of Systems:   Denies any subjective fever chills    Medication Review:   Current Facility-Administered Medications:   •  acetaminophen (TYLENOL) tablet 650 mg, 650 mg, Oral, Q4H PRN, 650 mg at 01/11/22 1504 **OR** acetaminophen (TYLENOL) 160 MG/5ML solution 650 mg, 650 mg, Oral, Q4H PRN **OR** acetaminophen (TYLENOL) suppository 650 mg, 650 mg, Rectal, Q4H PRN, Abby Rodgers APRN  •  ipratropium (ATROVENT HFA) inhaler 2 puff, 2 puff, Inhalation, Q4H PRN **AND** albuterol sulfate HFA (PROVENTIL HFA;VENTOLIN HFA;PROAIR HFA) inhaler 2 puff, 2 puff, Inhalation, Q4H PRN, Abby Rodgers APRN  •  amLODIPine (NORVASC) tablet 5 mg, 5 mg, Oral, Daily, Tawanna Vaughn MD, 5 mg at 01/11/22 0822  •  aspirin EC tablet 81 mg, 81 mg, Oral, Daily, Tawanna Vaughn MD, 81 mg at 01/11/22 0825  •  atorvastatin (LIPITOR) tablet 40 mg, 40 mg, Oral, Nightly, Tawanna Vaughn MD, 40 mg at 01/11/22 2013  •  budesonide-formoterol (SYMBICORT) 160-4.5 MCG/ACT inhaler 2 puff, 2 puff, Inhalation, BID - RT, Tawanna Vaughn MD, 2 puff at 01/12/22 0839  •  calcium carbonate (TUMS) chewable tablet 500 mg (200 mg elemental), 2 tablet, Oral, BID PRN, Abby Rodgers APRN  •  carvedilol (COREG) tablet 25 mg, 25 mg, Oral, BID With Meals, Tawanna Vaughn MD, 25 mg at 01/11/22 1815  •  dilTIAZem (CARDIZEM) tablet 30 mg, 30 mg, Oral, Daily, Williams Au MD, 30 mg at 01/11/22 1504  •  DULoxetine (CYMBALTA) DR capsule 30 mg, 30 mg, Oral, Daily, Tawanna Vaughn MD, 30 mg at 01/11/22 0822  •  enoxaparin (LOVENOX) syringe 30 mg, 30 mg, Subcutaneous, Q24H,  Tawanna Vaughn MD, 30 mg at 01/11/22 0828  •  folic acid (FOLVITE) tablet 1 mg, 1 mg, Oral, Daily, Tawanna Vaughn MD, 1 mg at 01/11/22 0822  •  guaiFENesin (MUCINEX) 12 hr tablet 600 mg, 600 mg, Oral, Q12H, Tawanna Vaughn MD, 600 mg at 01/11/22 2013  •  hydrocortisone (ANUSOL-HC) suppository 25 mg, 25 mg, Rectal, BID, Tawanna Vaughn MD, 25 mg at 01/11/22 2013  •  latanoprost (XALATAN) 0.005 % ophthalmic solution 1 drop, 1 drop, Both Eyes, Nightly, Tawanna Vaughn MD, 1 drop at 01/11/22 2013  •  Magnesium Sulfate 2 gram Bolus, followed by 8 gram infusion (total Mg dose 10 grams)- Mg less than or equal to 1mg/dL, 2 g, Intravenous, PRN **OR** Magnesium Sulfate 2 gram / 50mL Infusion (GIVE X 3 BAGS TO EQUAL 6GM TOTAL DOSE) - Mg 1.1 - 1.5 mg/dl, 2 g, Intravenous, PRN, Last Rate: 25 mL/hr at 01/08/22 1303, 2 g at 01/08/22 1303 **OR** Magnesium Sulfate 4 gram infusion- Mg 1.6-1.9 mg/dL, 4 g, Intravenous, PRN, Abby Rodgers APRN  •  nitroglycerin (NITROSTAT) SL tablet 0.4 mg, 0.4 mg, Sublingual, Q5 Min PRN, Abby Rodgers APRN  •  ondansetron (ZOFRAN) tablet 4 mg, 4 mg, Oral, Q6H PRN **OR** ondansetron (ZOFRAN) injection 4 mg, 4 mg, Intravenous, Q6H PRN, Abby Rodgers APRN  •  pantoprazole (PROTONIX) EC tablet 40 mg, 40 mg, Oral, Daily, Petr Raymundo MD, 40 mg at 01/11/22 0826  •  Pharmacy to dose vancomycin, , Does not apply, Continuous PRN, Eliza Andrews, APRN  •  polycarbophil tablet 625 mg, 625 mg, Oral, Daily, Petr Raymundo MD  •  potassium chloride (K-DUR,KLOR-CON) ER tablet 40 mEq, 40 mEq, Oral, PRN, 40 mEq at 01/08/22 1301 **OR** potassium chloride (KLOR-CON) packet 40 mEq, 40 mEq, Oral, PRN **OR** potassium chloride 10 mEq in 100 mL IVPB, 10 mEq, Intravenous, Q1H PRN, Abby Rodgers, [place]  •  [COMPLETED] Insert peripheral IV, , , Once **AND** sodium chloride 0.9 % flush 10 mL, 10 mL, Intravenous, PRN, Petr Rayo, PA  •  sodium chloride 0.9 % flush 10 mL, 10  mL, Intravenous, Q12H, Abby Rodgers APRN, 10 mL at 01/11/22 2013  •  sodium chloride 0.9 % flush 10 mL, 10 mL, Intravenous, PRN, Abby Rodgers APRN  •  timolol (TIMOPTIC) 0.25 % ophthalmic solution 1 drop, 1 drop, Both Eyes, Daily, Tawanna Vaughn MD, 1 drop at 01/11/22 0827  •  traMADol (ULTRAM) tablet 50 mg, 50 mg, Oral, Q8H PRN, Tawanna Vaughn MD  •  traZODone (DESYREL) tablet 50 mg, 50 mg, Oral, Nightly, Tawanna Vaughn MD, 50 mg at 01/11/22 2012  •  vancomycin 750 mg/250 mL 0.9% NS IVPB ([facility]), 750 mg, Intravenous, Q12H, Eliza Andrews APRN, 750 mg at 01/12/22 0013      Objective     Vital Sign Min/Max for last 24 hours  Temp  Min: 96 °F (35.6 °C)  Max: 97.7 °F (36.5 °C)   BP  Min: 134/90  Max: 155/56    Pulse  Min: 76  Max: 117     Wt Readings from Last 3 Encounters:   01/11/22 37.2 kg (82 lb 0.2 oz)   11/24/21 40.8 kg (90 lb)   11/02/21 41.7 kg (92 lb)        Intake/Output Summary (Last 24 hours) at 1/12/2022 0849  Last data filed at 1/12/2022 0600  Gross per 24 hour   Intake --   Output 900 ml   Net -900 ml     Physical Exam:      No physical exam due to COVID and preserve PPE                                                       Monitor:  nsr, occasional PVC  Results Review:     I reviewed the patient's new clinical results.    Sodium Sodium   Date Value Ref Range Status   01/12/2022 132 (L) 136 - 145 mmol/L Final   01/11/2022 132 (L) 136 - 145 mmol/L Final   01/10/2022 133 (L) 136 - 145 mmol/L Final      Potassium Potassium   Date Value Ref Range Status   01/12/2022 3.9 3.5 - 5.2 mmol/L Final   01/11/2022 3.3 (L) 3.5 - 5.2 mmol/L Final   01/10/2022 4.4 3.5 - 5.2 mmol/L Final     Comment:     Slight hemolysis detected by analyzer. Results may be affected.      Chloride Chloride   Date Value Ref Range Status   01/12/2022 95 (L) 98 - 107 mmol/L Final   01/11/2022 94 (L) 98 - 107 mmol/L Final   01/10/2022 94 (L) 98 - 107 mmol/L Final      Bicarbonate No results found for:  PLASMABICARB   BUN BUN   Date Value Ref Range Status   01/12/2022 8 8 - 23 mg/dL Final   01/11/2022 10 8 - 23 mg/dL Final   01/10/2022 10 8 - 23 mg/dL Final      Creatinine Creatinine   Date Value Ref Range Status   01/12/2022 0.36 (L) 0.57 - 1.00 mg/dL Final   01/11/2022 0.32 (L) 0.57 - 1.00 mg/dL Final   01/10/2022 0.38 (L) 0.57 - 1.00 mg/dL Final      Calcium Calcium   Date Value Ref Range Status   01/12/2022 8.7 8.2 - 9.6 mg/dL Final   01/11/2022 8.7 8.2 - 9.6 mg/dL Final   01/10/2022 8.9 8.2 - 9.6 mg/dL Final      Magnesium Magnesium   Date Value Ref Range Status   01/12/2022 1.8 1.7 - 2.3 mg/dL Final   01/11/2022 1.6 (L) 1.7 - 2.3 mg/dL Final   01/10/2022 1.9 1.7 - 2.3 mg/dL Final   01/09/2022 1.8 1.7 - 2.3 mg/dL Final        Results from last 7 days   Lab Units 01/12/22  0520   WBC 10*3/mm3 10.80   HEMOGLOBIN g/dL 12.2   HEMATOCRIT % 36.1   PLATELETS 10*3/mm3 447     Lab Results   Lab Value Date/Time    TROPONINT <0.010 01/08/2022 0037    TROPONINT <0.010 05/12/2021 1607     No results found for: CHOL  Lab Results   Component Value Date    HDL 65 04/05/2019    HDL 55 10/02/2018    HDL 50 03/28/2018     Lab Results   Component Value Date    LDL 84 04/05/2019    LDL 92 10/02/2018    LDL 98 03/28/2018     Lab Results   Component Value Date    TRIG 108 04/05/2019    TRIG 224 (H) 10/02/2018    TRIG 138 03/28/2018     No components found for: CHOLHDL            Echo EF Estimated  No results found for: ECHOEFEST       Assessment/ Plan  1. Chronic systolic CHF with EF 40% per Echo on 1/8/22      A. Echo 1/8/22: Calculated left ventricular EF = 40% Estimated left ventricular EF was in agreement with the calculated left ventricular EF. Left ventricular systolic function is moderately decreased. The following left ventricular wall segments are hypokinetic: mid inferolateral, mid inferior, basal inferoseptal, basal inferior and basal inferoseptal.      B. Echo 2021: TTE at that time showed EF 48% with mild septal  hypokinesis.       2. MRSA PNA and bacteremia  3. COVID 19  4. HTN  5. Acute on chronic abdominal pain  6. Heme positive stool with mild anemia, HgB stable  7.  HLD  8. ACE allergy   Plan #1 blood pressure and ventricular rate up some this morning.  I think we need to wait and see what effect the carvedilol and the low-dose diltiazem have.  She is also on amlodipine.  #2 otherwise does not seem to be any significant cardiac issues.  CT scan did not show any pulmonary emboli.  Interestingly there was no pericardial effusion seen on the CT scan but I reviewed the echo done on 1/10/2022 and there does seem to be a small effusion.  No suggestion of any acute heart failure on the CT scan.  She is not on any diuretics.  Patient says she is feeling better today.  Her O2 sats 97% on room air.        Shamar Root MD  01/12/22  08:49 EST      Time: 17 minutes

## 2022-01-12 NOTE — PROGRESS NOTES
Name: Bo Adan ADMIT: 2022   : 10/24/1930  PCP: Hernan Cruz MD    MRN: [id number] LOS: 4 days   AGE/SEX: 89 y.o. female  ROOM: [facility]     Subjective   Subjective   Feeling a little better again today. No abd pain or N/V. Tolerating diet. Voiding well now. Cough more productive today. No SOA or CP. Still c/o having several BMs in bed overnight.     Review of Systems   Constitutional: Positive for fatigue. Negative for appetite change, chills, diaphoresis and fever.   HENT: Negative for congestion, ear pain, nosebleeds, rhinorrhea, sore throat, trouble swallowing and voice change.    Eyes: Negative for pain and visual disturbance.   Respiratory: Positive for cough. Negative for chest tightness, shortness of breath, wheezing and stridor.    Cardiovascular: Negative for chest pain and palpitations.   Gastrointestinal: Negative for abdominal pain, blood in stool, diarrhea, nausea and vomiting.   Endocrine: Negative for cold intolerance and heat intolerance.   Genitourinary: Negative for decreased urine volume, difficulty urinating, dysuria and hematuria.   Musculoskeletal: Negative for back pain and neck pain.   Skin: Negative for color change, pallor and rash.   Allergic/Immunologic: Negative for environmental allergies and food allergies.   Neurological: Negative for tremors, seizures, syncope, speech difficulty and headaches.   Hematological: Negative for adenopathy. Does not bruise/bleed easily.   Psychiatric/Behavioral: Negative for behavioral problems, confusion and hallucinations.        Objective   Objective   Vital Signs  Temp:  [96 °F (35.6 °C)-97.7 °F (36.5 °C)] 96 °F (35.6 °C)  Heart Rate:  [] 117  Resp:  [14-18] 14  BP: (134-155)/() 148/110  SpO2:  [97 %-98 %] 97 %  on   ;   Device (Oxygen Therapy): room air  Body mass index is 16.02 kg/m².  Physical Exam  Vitals and nursing note reviewed.   Constitutional:       General: She is not in acute distress.     Appearance:  She is ill-appearing (chronically). She is not toxic-appearing or diaphoretic.      Comments: Frail and elderly   HENT:      Head: Normocephalic and atraumatic.      Right Ear: External ear normal.      Left Ear: External ear normal.      Nose: Nose normal.      Mouth/Throat:      Mouth: Mucous membranes are moist.      Pharynx: Oropharynx is clear.   Eyes:      General: No scleral icterus.        Right eye: No discharge.         Left eye: No discharge.      Conjunctiva/sclera: Conjunctivae normal.   Cardiovascular:      Rate and Rhythm: Normal rate and regular rhythm.      Pulses: Normal pulses.      Heart sounds: Murmur heard.       Pulmonary:      Effort: No respiratory distress.      Breath sounds: No wheezing, rhonchi or rales.      Comments: CTAB with decreased BS right base  Abdominal:      General: Bowel sounds are normal. There is no distension.      Palpations: Abdomen is soft.      Tenderness: There is no abdominal tenderness.   Musculoskeletal:         General: No swelling or deformity. Normal range of motion.      Cervical back: Neck supple. No rigidity.   Lymphadenopathy:      Cervical: No cervical adenopathy.   Skin:     General: Skin is warm and dry.      Capillary Refill: Capillary refill takes less than 2 seconds.      Coloration: Skin is not jaundiced.      Findings: No rash.   Neurological:      General: No focal deficit present.      Mental Status: She is alert and oriented to person, place, and time. Mental status is at baseline.      Cranial Nerves: No cranial nerve deficit.      Coordination: Coordination normal.      Comments: [facility]   Psychiatric:         Mood and Affect: Mood normal.         Behavior: Behavior normal.      Comments: Very pleasant         Results Review     I reviewed the patient's new clinical results.  Results from last 7 days   Lab Units 01/12/22  0520 01/11/22  0453 01/10/22  0454 01/09/22  1233   WBC 10*3/mm3 10.80 10.33 11.76* 23.03*   HEMOGLOBIN g/dL 12.2 11.7* 11.0*  11.2*   PLATELETS 10*3/mm3 447 460* 389 394     Results from last 7 days   Lab Units 01/12/22  0520 01/11/22  0453 01/10/22  0454 01/08/22  1635   SODIUM mmol/L 132* 132* 133* 135*   POTASSIUM mmol/L 3.9 3.3* 4.4 4.9   CHLORIDE mmol/L 95* 94* 94* 97*   CO2 mmol/L 27.6 27.3 22.3 24.8   BUN mg/dL 8 10 10 9   CREATININE mg/dL 0.36* 0.32* 0.38* 0.44*   GLUCOSE mg/dL 82 90 95 112*   EGFR IF NONAFRICN AM mL/min/1.73 >150 >150 >150 134     Results from last 7 days   Lab Units 01/12/22  0520 01/11/22  0453 01/10/22  0454 01/08/22  1635   ALBUMIN g/dL 3.10* 3.10* 3.30* 3.30*   BILIRUBIN mg/dL 0.4 0.4 0.3 0.2   ALK PHOS U/L 67 65 68 82   AST (SGOT) U/L 14 14 17 17   ALT (SGPT) U/L 13 15 13 15     Results from last 7 days   Lab Units 01/12/22  0520 01/11/22  0453 01/10/22  0454 01/09/22  1233 01/08/22  1635 01/08/22  1127   CALCIUM mg/dL 8.7 8.7 8.9  --  8.5  --    ALBUMIN g/dL 3.10* 3.10* 3.30*  --  3.30*  --    MAGNESIUM mg/dL 1.8 1.6* 1.9 1.8  --    < >    < > = values in this interval not displayed.     Results from last 7 days   Lab Units 01/12/22  0520 01/10/22  0454 01/08/22  1127 01/08/22  0037   PROCALCITONIN ng/mL 0.06 0.05 0.05 0.07   LACTATE mmol/L  --   --   --  1.1     COVID19   Date Value Ref Range Status   01/08/2022 Detected (C) Not Detected - Ref. Range Final   01/08/2022 Detected (C) Not Detected - Ref. Range Final     No results found for: HGBA1C, POCGLU    CT Angiogram Chest  Narrative: CT ANGIOGRAM CHEST WITH CONTRAST, PULMONARY EMBOLISM PROTOCOL     HISTORY: Hypoxia. Shortness of breath, patient is COVID positive. Rule  out pulmonary embolism.     TECHNIQUE: Spiral CT images were obtained from the lung apices to the  diaphragmatic domes following administration of intravenous contrast in  the angiographic phase. Coronal, sagittal and 3-D volume rendered  reformats were then obtained.     COMPARISON: None     FINDINGS: The pulmonary arteries are well opacified with intravenous  contrast.There are no  convincing filling defects to suggest pulmonary  artery thromboembolism. No pleural or pericardial effusion is seen.  There is no pathological mediastinal lymphadenopathy. Calcified coronary  artery disease is present. Calcified plaque is seen within the thoracic  aorta. Mild distention of the proximal esophagus may be related to  dysmotility. The central airways are patent without endobronchial  lesion. There are a few patchy areas of groundglass density along with  the linear atelectatic changes demonstrated particularly within the  right upper and middle lobe. Small area of dense consolidation within  the medial right lower lobe stable to slightly improved from recent CT  on 01/08/2022.      No pathological axillary lymphadenopathy. The visualized upper abdomen  demonstrates pneumobilia. Partly imaged left renal cyst.     Impression: 1. No convincing evidence of pulmonary artery thromboembolism.  2. Few scattered areas of groundglass density atelectasis and an area of  dense consolidation within the right medial lower lobe which is stable  to minimally improved from most recent CT abdomen on 01/08/2022.     Radiation dose reduction techniques were utilized, including automated  exposure control and exposure modulation based on body size.          Scheduled Medications  amLODIPine, 5 mg, Oral, Daily  aspirin, 81 mg, Oral, Daily  atorvastatin, 40 mg, Oral, Nightly  budesonide-formoterol, 2 puff, Inhalation, BID - RT  carvedilol, 25 mg, Oral, BID With Meals  dilTIAZem, 30 mg, Oral, Daily  DULoxetine, 30 mg, Oral, Daily  enoxaparin, 30 mg, Subcutaneous, Q24H  folic acid, 1 mg, Oral, Daily  guaiFENesin, 600 mg, Oral, Q12H  hydrocortisone, 25 mg, Rectal, BID  latanoprost, 1 drop, Both Eyes, Nightly  pantoprazole, 40 mg, Oral, Daily  calcium polycarbophil, 625 mg, Oral, Daily  sodium chloride, 10 mL, Intravenous, Q12H  timolol, 1 drop, Both Eyes, Daily  traZODone, 50 mg, Oral, Nightly  vancomycin, 750 mg, Intravenous,  Q12H    Infusions  Pharmacy to dose vancomycin,     Diet  Diet Regular       Assessment/Plan     Active Hospital Problems    Diagnosis  POA   • **Pneumonia of right lower lobe due to methicillin resistant Staphylococcus aureus (MRSA) ([facility]) [J15.212]  Yes   • Chronic systolic CHF (congestive heart failure) ([facility]) [I50.22]  Yes   • Severe malnutrition (CMS/HCC) [E43]  Yes   • Occult GI bleeding [R19.5]  Yes   • Hypokalemia [E87.6]  Yes   • Hypomagnesemia [E83.42]  Yes   • Essential hypertension [I10]  Yes   • GERD without esophagitis [K21.9]  Yes   • History of cervical cancer [Z85.41]  Not Applicable   • HLD (hyperlipidemia) [E78.5]  Yes      Resolved Hospital Problems   No resolved problems to display.       89 y.o. female with Pneumonia of right lower lobe due to methicillin resistant Staphylococcus aureus (MRSA) ([facility]).    1.  Lower abdominal pain/diarrhea/questionable melena/urinary retention by CT scan.  Benign GI examination.  Urinary retention has resolved clinically. Stool for C. Difficile and GI PCR are negative.  Positive Hemoccult stool.  Lipase is normal.  CT scan of the abdomen revealed no acute intra-abdominal process. Patient is too sick to undergo endoscopy at this time.  GI says her abd pain is chronic and not far from her baseline, and her Hgb is stable. They recommend f/u with Dr. Fernández as outpt. Continue Protonix. Decreased polycarbophil to once daily given her loose stool/incontinence--did not seem to help, will dc altogether and monitor.  2.  RLL MRSA Pneumonia/Bacteremia (on admission felt to be COViD with possible superimposed bacterial infection)/leukocytosis.  Patient was out of the window for remdesivir treatment. Inflammatory markers falling. S/p tx with Decadron/Rocephin/Zithromax (see below). Negative Legionella and Strep urinary antigen. Blood culture grew MRSA in one bottle. ID consulted. COViD tx and Rocephin stopped and IV Vanc started for what ID feels is MRSA PNA with bacteremia. Now  sputum culture has grown MRSA. Echo obtained to r/o endocarditis--no evidence of this noted. Negative respiratory PCR panel except for COViD. Please note that her leukocytosis could also be secondary to steroids. WBC is normalized this AM. DDimer slightly elevated and pt had PNA a couple months ago, CTA chest negative for PE or malignancy, RLL infiltrate stable.   3.  Heme+ Anemia.  Hemoglobin stable around baseline.  Will monitor. See #1.  4.  Hypomagnesemia/hypokalemia/hyponatremia.   HypoNa+ secondary to dehydration which has improved after IV NS.  Stable today in low 130s. Continue K+ protocol.  Mg++ 1.8 this AM after IV replacement. Normal renal function.   5.  Hypertension.  No evidence of angina or congestive heart failure.  BPs acceptable. Continue Norvasc and Coreg and monitor.  Patient is slightly tachycardic at times.    6.  Hyperlipidemia.  Continue Lipitor.  7.  Chronic systolic CHF and pericardial effusion. EF only 40% with LV hypokinesis and pericardial effusion. Card feels echo is unchanged since prior. They have started very low dose Cardizem to help with her elevated HRs here. They would like to watch another day to make sure BPs and HRs are stable on new meds. They feel her pericardial effusion benign and not infectious.    During all interaction with pt proper PPE was utilized (gown, gloves, mask, goggles, and face shield) and was donned/doffed according to recommendations. Hands were cleaned before and after interaction.    Lovenox 30 mg SC daily for DVT prophylaxis.  Full code.  Discussed with patient, family, nursing staff, CCP and care team on multidisciplinary rounds. D/w natasha Hernandez on phone.  Anticipate discharge home with home health, hopefully tomorrow on Zyvox if HR/BP are stable and it's okay with Card.      Petr Raymundo MD  [facility]  01/12/22  12:10 EST

## 2022-01-12 NOTE — PLAN OF CARE
Goal Outcome Evaluation:  Plan of Care Reviewed With: patient        Progress: improving  Outcome Summary: vss. no acute changes over shift. iv vanc. on ra. turns if reminded. mellissa. will ctm

## 2022-01-12 NOTE — PLAN OF CARE
Goal Outcome Evaluation:  Plan of Care Reviewed With: patient            Pt VSS, A&O X4,BM today, mellissa, pt had 20 gauge rt ac for CT angiogram for chest, consult called to cardiology, Mag given and K given,

## 2022-01-13 ENCOUNTER — TRANSCRIBE ORDERS (OUTPATIENT)
Dept: HOME HEALTH SERVICES | Facility: HOME HEALTHCARE | Age: 87
End: 2022-01-13

## 2022-01-13 ENCOUNTER — HOME HEALTH ADMISSION (OUTPATIENT)
Dept: HOME HEALTH SERVICES | Facility: HOME HEALTHCARE | Age: 87
End: 2022-01-13

## 2022-01-13 ENCOUNTER — READMISSION MANAGEMENT (OUTPATIENT)
Dept: CALL CENTER | Facility: HOSPITAL | Age: 87
End: 2022-01-13

## 2022-01-13 VITALS
SYSTOLIC BLOOD PRESSURE: 122 MMHG | TEMPERATURE: 96.9 F | HEART RATE: 97 BPM | RESPIRATION RATE: 16 BRPM | DIASTOLIC BLOOD PRESSURE: 73 MMHG | OXYGEN SATURATION: 98 % | HEIGHT: 60 IN | WEIGHT: 82.01 LBS | BODY MASS INDEX: 16.1 KG/M2

## 2022-01-13 DIAGNOSIS — I10 HYPERTENSION, UNSPECIFIED TYPE: ICD-10-CM

## 2022-01-13 DIAGNOSIS — I50.22 CHRONIC SYSTOLIC CONGESTIVE HEART FAILURE: ICD-10-CM

## 2022-01-13 DIAGNOSIS — U07.1 COVID-19: ICD-10-CM

## 2022-01-13 DIAGNOSIS — J15.212 PNEUMONIA OF RIGHT LOWER LOBE DUE TO METHICILLIN RESISTANT STAPHYLOCOCCUS AUREUS (MRSA): Primary | ICD-10-CM

## 2022-01-13 LAB
ALBUMIN SERPL-MCNC: 3.1 G/DL (ref 3.5–5.2)
ALBUMIN/GLOB SERPL: 1.3 G/DL
ALP SERPL-CCNC: 67 U/L (ref 39–117)
ALT SERPL W P-5'-P-CCNC: 15 U/L (ref 1–33)
ANION GAP SERPL CALCULATED.3IONS-SCNC: 11.8 MMOL/L (ref 5–15)
AST SERPL-CCNC: 13 U/L (ref 1–32)
BACTERIA SPEC AEROBE CULT: NORMAL
BASOPHILS # BLD AUTO: 0.02 10*3/MM3 (ref 0–0.2)
BASOPHILS NFR BLD AUTO: 0.2 % (ref 0–1.5)
BILIRUB SERPL-MCNC: 0.5 MG/DL (ref 0–1.2)
BUN SERPL-MCNC: 8 MG/DL (ref 8–23)
BUN/CREAT SERPL: 19.5 (ref 7–25)
CALCIUM SPEC-SCNC: 8.7 MG/DL (ref 8.2–9.6)
CHLORIDE SERPL-SCNC: 96 MMOL/L (ref 98–107)
CO2 SERPL-SCNC: 26.2 MMOL/L (ref 22–29)
CREAT SERPL-MCNC: 0.41 MG/DL (ref 0.57–1)
CRP SERPL-MCNC: 0.74 MG/DL (ref 0–0.5)
D DIMER PPP FEU-MCNC: 0.75 MCGFEU/ML (ref 0–0.49)
DEPRECATED RDW RBC AUTO: 45.6 FL (ref 37–54)
EOSINOPHIL # BLD AUTO: 0.07 10*3/MM3 (ref 0–0.4)
EOSINOPHIL NFR BLD AUTO: 0.7 % (ref 0.3–6.2)
ERYTHROCYTE [DISTWIDTH] IN BLOOD BY AUTOMATED COUNT: 13 % (ref 12.3–15.4)
FERRITIN SERPL-MCNC: 646 NG/ML (ref 13–150)
GFR SERPL CREATININE-BSD FRML MDRD: 145 ML/MIN/1.73
GLOBULIN UR ELPH-MCNC: 2.4 GM/DL
GLUCOSE SERPL-MCNC: 84 MG/DL (ref 65–99)
HCT VFR BLD AUTO: 34.7 % (ref 34–46.6)
HGB BLD-MCNC: 12 G/DL (ref 12–15.9)
IMM GRANULOCYTES # BLD AUTO: 0.23 10*3/MM3 (ref 0–0.05)
IMM GRANULOCYTES NFR BLD AUTO: 2.2 % (ref 0–0.5)
LYMPHOCYTES # BLD AUTO: 0.37 10*3/MM3 (ref 0.7–3.1)
LYMPHOCYTES NFR BLD AUTO: 3.6 % (ref 19.6–45.3)
MAGNESIUM SERPL-MCNC: 1.6 MG/DL (ref 1.7–2.3)
MCH RBC QN AUTO: 32.9 PG (ref 26.6–33)
MCHC RBC AUTO-ENTMCNC: 34.6 G/DL (ref 31.5–35.7)
MCV RBC AUTO: 95.1 FL (ref 79–97)
MONOCYTES # BLD AUTO: 1.04 10*3/MM3 (ref 0.1–0.9)
MONOCYTES NFR BLD AUTO: 10 % (ref 5–12)
NEUTROPHILS NFR BLD AUTO: 8.68 10*3/MM3 (ref 1.7–7)
NEUTROPHILS NFR BLD AUTO: 83.3 % (ref 42.7–76)
NRBC BLD AUTO-RTO: 0 /100 WBC (ref 0–0.2)
PLATELET # BLD AUTO: 443 10*3/MM3 (ref 140–450)
PMV BLD AUTO: 8.8 FL (ref 6–12)
POTASSIUM SERPL-SCNC: 3.6 MMOL/L (ref 3.5–5.2)
PROT SERPL-MCNC: 5.5 G/DL (ref 6–8.5)
RBC # BLD AUTO: 3.65 10*6/MM3 (ref 3.77–5.28)
SODIUM SERPL-SCNC: 134 MMOL/L (ref 136–145)
WBC NRBC COR # BLD: 10.41 10*3/MM3 (ref 3.4–10.8)

## 2022-01-13 PROCEDURE — 83735 ASSAY OF MAGNESIUM: CPT | Performed by: INTERNAL MEDICINE

## 2022-01-13 PROCEDURE — 94799 UNLISTED PULMONARY SVC/PX: CPT

## 2022-01-13 PROCEDURE — 25010000002 ENOXAPARIN PER 10 MG: Performed by: INTERNAL MEDICINE

## 2022-01-13 PROCEDURE — 82728 ASSAY OF FERRITIN: CPT | Performed by: INTERNAL MEDICINE

## 2022-01-13 PROCEDURE — 99232 SBSQ HOSP IP/OBS MODERATE 35: CPT | Performed by: INTERNAL MEDICINE

## 2022-01-13 PROCEDURE — 85025 COMPLETE CBC W/AUTO DIFF WBC: CPT | Performed by: INTERNAL MEDICINE

## 2022-01-13 PROCEDURE — 94761 N-INVAS EAR/PLS OXIMETRY MLT: CPT

## 2022-01-13 PROCEDURE — 86140 C-REACTIVE PROTEIN: CPT | Performed by: INTERNAL MEDICINE

## 2022-01-13 PROCEDURE — 80053 COMPREHEN METABOLIC PANEL: CPT | Performed by: INTERNAL MEDICINE

## 2022-01-13 PROCEDURE — 85379 FIBRIN DEGRADATION QUANT: CPT | Performed by: NURSE PRACTITIONER

## 2022-01-13 PROCEDURE — 25010000002 VANCOMYCIN 750 MG RECONSTITUTED SOLUTION: Performed by: NURSE PRACTITIONER

## 2022-01-13 RX ORDER — ALBUTEROL SULFATE 90 UG/1
2 AEROSOL, METERED RESPIRATORY (INHALATION) EVERY 4 HOURS PRN
Qty: 6.7 G | Refills: 0 | Status: SHIPPED | OUTPATIENT
Start: 2022-01-13

## 2022-01-13 RX ORDER — BUDESONIDE AND FORMOTEROL FUMARATE DIHYDRATE 160; 4.5 UG/1; UG/1
2 AEROSOL RESPIRATORY (INHALATION)
Qty: 10.2 G | Refills: 0 | Status: SHIPPED | OUTPATIENT
Start: 2022-01-13

## 2022-01-13 RX ORDER — LINEZOLID 600 MG/1
600 TABLET, FILM COATED ORAL 2 TIMES DAILY
Qty: 13 TABLET | Refills: 0 | Status: SHIPPED | OUTPATIENT
Start: 2022-01-13 | End: 2022-01-20

## 2022-01-13 RX ORDER — L.ACID,PARA/B.BIFIDUM/S.THERM 8B CELL
1 CAPSULE ORAL DAILY
Qty: 30 CAPSULE | Refills: 0 | Status: SHIPPED | OUTPATIENT
Start: 2022-01-14

## 2022-01-13 RX ORDER — ACETAMINOPHEN 500 MG
500 TABLET ORAL EVERY 6 HOURS PRN
Qty: 30 TABLET | Refills: 0
Start: 2022-01-13

## 2022-01-13 RX ORDER — LINEZOLID 600 MG/1
600 TABLET, FILM COATED ORAL 2 TIMES DAILY
Qty: 6 TABLET | Refills: 0 | Status: SHIPPED | OUTPATIENT
Start: 2022-01-20 | End: 2022-11-11

## 2022-01-13 RX ADMIN — FOLIC ACID 1 MG: 1 TABLET ORAL at 09:53

## 2022-01-13 RX ADMIN — SODIUM CHLORIDE, PRESERVATIVE FREE 10 ML: 5 INJECTION INTRAVENOUS at 10:00

## 2022-01-13 RX ADMIN — PANTOPRAZOLE SODIUM 40 MG: 40 TABLET, DELAYED RELEASE ORAL at 09:53

## 2022-01-13 RX ADMIN — GUAIFENESIN 600 MG: 600 TABLET, EXTENDED RELEASE ORAL at 09:53

## 2022-01-13 RX ADMIN — ASPIRIN 81 MG: 81 TABLET, COATED ORAL at 09:52

## 2022-01-13 RX ADMIN — BUDESONIDE AND FORMOTEROL FUMARATE DIHYDRATE 2 PUFF: 160; 4.5 AEROSOL RESPIRATORY (INHALATION) at 07:29

## 2022-01-13 RX ADMIN — DILTIAZEM HYDROCHLORIDE 30 MG: 30 TABLET, FILM COATED ORAL at 09:52

## 2022-01-13 RX ADMIN — AMLODIPINE BESYLATE 5 MG: 5 TABLET ORAL at 09:53

## 2022-01-13 RX ADMIN — ACETAMINOPHEN 650 MG: 325 TABLET, FILM COATED ORAL at 13:12

## 2022-01-13 RX ADMIN — VANCOMYCIN HYDROCHLORIDE 750 MG: 10 INJECTION, POWDER, LYOPHILIZED, FOR SOLUTION INTRAVENOUS at 11:12

## 2022-01-13 RX ADMIN — CARVEDILOL 25 MG: 25 TABLET, FILM COATED ORAL at 09:52

## 2022-01-13 RX ADMIN — Medication 1 CAPSULE: at 09:53

## 2022-01-13 RX ADMIN — ENOXAPARIN SODIUM 30 MG: 30 INJECTION SUBCUTANEOUS at 09:52

## 2022-01-13 RX ADMIN — HYDROCORTISONE ACETATE 25 MG: 25 SUPPOSITORY RECTAL at 09:53

## 2022-01-13 RX ADMIN — DULOXETINE HYDROCHLORIDE 30 MG: 30 CAPSULE, DELAYED RELEASE ORAL at 09:53

## 2022-01-13 RX ADMIN — TIMOLOL MALEATE 1 DROP: 2.5 SOLUTION OPHTHALMIC at 09:52

## 2022-01-13 NOTE — PLAN OF CARE
Goal Outcome Evaluation:  Plan of Care Reviewed With: patient        Progress: improving  Outcome Summary: pt denies pain/needs. VSS. IV vanc given per orders. Possible discharge today.

## 2022-01-13 NOTE — PROGRESS NOTES
Continued Stay Note  [facility]     Patient Name: Bo Adan  MRN: [id number]  Today's Date: 1/13/2022    Admit Date: 1/8/2022     Discharge Plan     Row Name 01/13/22 1413       Plan    Plan Home with family assist and [facility] with private pay assist    Plan Comments Notified [facility] that patient has dc orders.  Dtr Mary will transport.  Medication per Meds to beds.  .....................Elana Allison RN               Discharge Codes    No documentation.               Expected Discharge Date and Time     Expected Discharge Date Expected Discharge Time    Jan 13, 2022             Elana Allison RN

## 2022-01-13 NOTE — DISCHARGE INSTRUCTIONS
Follow up with cardiology Dr Au Ph: [phone]      10 Things You Can Do to Manage Your COVID-19 Symptoms at Home  If you have possible or confirmed COVID-19:  1. Stay home except to get medical care.  2. Monitor your symptoms carefully. If your symptoms get worse, call your healthcare provider immediately.  3. Get rest and stay hydrated.  4. If you have a medical appointment, call the healthcare provider ahead of time and tell them that you have or may have COVID-19.  5. For medical emergencies, call 911 and notify the dispatch personnel that you have or may have COVID-19.  6. Cover your cough and sneezes with a tissue or use the inside of your elbow.  7. Wash your hands often with soap and water for at least 20 seconds or clean your hands with an alcohol-based hand  that contains at least 60% alcohol.  8. As much as possible, stay in a specific room and away from other people in your home. Also, you should use a separate bathroom, if available. If you need to be around other people in or outside of the home, wear a mask.  9. Avoid sharing personal items with other people in your household, like dishes, towels, and bedding.  10. Clean all surfaces that are touched often, like counters, tabletops, and doorknobs. Use household cleaning sprays or wipes according to the label instructions.  cdc.gov/coronavirus  07/16/2021  This information is not intended to replace advice given to you by your health care provider. Make sure you discuss any questions you have with your health care provider.  Document Revised: 08/04/2021 Document Reviewed: 08/04/2021  Elsevier Patient Education © 2021 [employer]  COVID-19: Quarantine vs. Isolation  QUARANTINE keeps someone who was in close contact with someone who has COVID-19 away from others.  If you had close contact with a person who has COVID-19  · The best way to protect yourself and others is to stay home for 14 days after your last contact. Check your local [facility]  "department's website for information about options in your area to possibly shorten this quarantine period.  · Check your temperature twice a day and watch for symptoms of COVID-19.  · If possible, stay away from people who are at higher-risk for getting very sick from COVID-19.  ISOLATION keeps someone who is sick or tested positive for COVID-19 without symptoms away from others, even in their own home.  If you are sick and think or know you have COVID-19  · Stay home until after  ? At least 10 days since symptoms first appeared and  ? At least 24 hours with no fever without fever-reducing medication and  ? Symptoms have improved  If you tested positive for COVID-19 but do not have symptoms  · Stay home until after  ? 10 days have passed since your positive test  If you live with others, stay in a specific \"sick room\" or area and away from other people or animals, including pets. Use a separate bathroom, if available.  cdc.gov/coronavirus  12/17/2020  This information is not intended to replace advice given to you by your health care provider. Make sure you discuss any questions you have with your health care provider.  Document Revised: 07/13/2021 Document Reviewed: 07/13/2021  Elsevier Patient Education © 2021 Elsevier Inc.    "

## 2022-01-13 NOTE — NURSING NOTE
Patient going home in wheelchair with her medication Zyvox antibiotic explain patient and daughter by phone patient need to take for ten days and follow up with PCP and cardiology.

## 2022-01-13 NOTE — DISCHARGE SUMMARY
Patient Name: Bo Adan  : 10/24/1930  MRN: [id number]    Date of Admission: 2022  Date of Discharge:  2022  Primary Care Physician: Hernan Cruz MD      Chief Complaint:   Abdominal Pain      Discharge Diagnoses     Active Hospital Problems    Diagnosis  POA   • **Pneumonia of right lower lobe due to methicillin resistant Staphylococcus aureus (MRSA) ([facility]) [J15.212]  Yes   • Chronic systolic CHF (congestive heart failure) (HCC) [I50.22]  Yes   • Severe malnutrition (CMS/HCC) [E43]  Yes   • Occult GI bleeding [R19.5]  Yes   • Hypokalemia [E87.6]  Yes   • Hypomagnesemia [E83.42]  Yes   • Essential hypertension [I10]  Yes   • GERD without esophagitis [K21.9]  Yes   • History of cervical cancer [Z85.41]  Not Applicable   • HLD (hyperlipidemia) [E78.5]  Yes      Resolved Hospital Problems   No resolved problems to display.        Hospital Course     Ms. Adan is a 89 y.o. female with a history of hypertension, cervical cancer with treatment by radiotherapy, GERD, osteoarthritis and recent COVID-19 diagnosis who presented to [facility] initially complaining of intermittent lower abdominal pain with diarrhea and melena.  Please see the admitting history and physical for further details.  She was found to have chronic abdominal pain and was admitted to the hospital for further evaluation and treatment.  GI consultation obtained on admission.  They believe this abdominal pain to be near her baseline with mild exacerbation likely secondary to COVID-19 infection.  They are recommending increasing stool bulking agents and following up with Dr. Fernández as an outpatient.  On admission the patient's Decadron was continued for her COVID-pneumonia however there was concern that she had a superimposed bacterial pneumonia and she was started on ceftriaxone.  The patient had a sputum and blood culture positive for MRSA for which ID was consulted.  She was started on IV vancomycin for  MRSA bacteremia from suspected source of MRSA pneumonia.  The patient opted for oral treatment for MRSA pneumonia/bacteremia and will be discharged on a 10-day course of Zyvox to complete a total of 14 days (initially sent with 7 days however clarified with infectious disease and have called in additional 3 days of treatment to the patient's pharmacy again discussed with the patient's daughter who will  the prescription).  Cardiology was consulted for her chronic systolic CHF and pericardial effusion, she was started on low-dose diltiazem and cardiology does not recommend an intervention for her small pericardial effusion.  Patient was given contact information for Dr. Jackson, cardiologist for outpatient follow-up.    Day of Discharge     Subjective:  The patient is doing well today, she is looking forward to going home.  She denies any complaints of chest pain, shortness of breath, nausea, vomiting, abdominal pain, chills.    Review of Systems   Constitutional: Negative for fatigue and fever.   HENT: Negative for sinus pressure and sore throat.    Eyes: Negative for pain and redness.   Respiratory: Negative for cough and shortness of breath.    Cardiovascular: Negative for chest pain, palpitations and leg swelling.   Gastrointestinal: Negative for abdominal pain, nausea and vomiting.   Skin: Negative for rash and wound.   Psychiatric/Behavioral: Negative for behavioral problems and dysphoric mood.       Physical Exam:  Temp:  [96.8 °F (36 °C)-97.1 °F (36.2 °C)] 96.9 °F (36.1 °C)  Heart Rate:  [] 97  Resp:  [16-18] 16  BP: (116-147)/() 122/73  Body mass index is 16.02 kg/m².  Physical Exam  Vitals and nursing note reviewed.   Constitutional:       General: She is not in acute distress.     Appearance: She is ill-appearing (chronically). She is not toxic-appearing or diaphoretic.      Comments: Frail and elderly   HENT:      Head: Normocephalic and atraumatic.      Right Ear: External ear normal.       Left Ear: External ear normal.      Nose: Nose normal.      Mouth/Throat:      Mouth: Mucous membranes are moist.      Pharynx: Oropharynx is clear.   Eyes:      General: No scleral icterus.        Right eye: No discharge.         Left eye: No discharge.      Conjunctiva/sclera: Conjunctivae normal.   Cardiovascular:      Rate and Rhythm: Normal rate and regular rhythm.      Pulses: Normal pulses.      Heart sounds: Murmur heard.       Pulmonary:      Effort: No respiratory distress.      Breath sounds: No wheezing, rhonchi or rales.      Comments: CTAB with decreased BS right base  Abdominal:      General: Bowel sounds are normal. There is no distension.      Palpations: Abdomen is soft.      Tenderness: There is no abdominal tenderness.   Musculoskeletal:         General: No swelling or deformity. Normal range of motion.      Cervical back: Neck supple. No rigidity.   Lymphadenopathy:      Cervical: No cervical adenopathy.   Skin:     General: Skin is warm and dry.      Capillary Refill: Capillary refill takes less than 2 seconds.      Coloration: Skin is not jaundiced.      Findings: No rash.   Neurological:      General: No focal deficit present.      Mental Status: She is alert and oriented to person, place, and time. Mental status is at baseline.      Cranial Nerves: No cranial nerve deficit.      Coordination: Coordination normal.      Comments: [facility]   Psychiatric:         Mood and Affect: Mood normal.         Behavior: Behavior normal.      Comments: Very pleasant         Consultants     Consult Orders (all) (From admission, onward)     Start     Ordered    01/10/22 1723  Inpatient Cardiology Consult  Once        Specialty:  Cardiology  Provider:  Petr Ortiz Jr., MD    01/10/22 1722    01/10/22 1058  Inpatient Infectious Diseases Consult  Once        Specialty:  Infectious Diseases  Provider:  Lucio Rodríguez MD    01/10/22 1058    01/09/22 1101  Inpatient Gastroenterology Consult  Once         Specialty:  Gastroenterology  Provider:  Christi Benjamin MD    01/09/22 1100    01/08/22 0333  [facility] (on-call MD unless specified) Details  Once,   Status:  Canceled        Specialty:  Hospitalist  Provider:  (Not yet assigned)    01/08/22 0332              Procedures     Imaging Results (All)     Procedure Component Value Units Date/Time    CT Angiogram Chest [[id number]] Collected: 01/11/22 1344     Updated: 01/12/22 1406    Narrative:      CT ANGIOGRAM CHEST WITH CONTRAST, PULMONARY EMBOLISM PROTOCOL     HISTORY: Hypoxia. Shortness of breath, patient is COVID positive. Rule  out pulmonary embolism.     TECHNIQUE: Spiral CT images were obtained from the lung apices to the  diaphragmatic domes following administration of intravenous contrast in  the angiographic phase. Coronal, sagittal and 3-D volume rendered  reformats were then obtained.     COMPARISON: None     FINDINGS: The pulmonary arteries are well opacified with intravenous  contrast.There are no convincing filling defects to suggest pulmonary  artery thromboembolism. No pleural or pericardial effusion is seen.  There is no pathological mediastinal lymphadenopathy. Calcified coronary  artery disease is present. Calcified plaque is seen within the thoracic  aorta. Mild distention of the proximal esophagus may be related to  dysmotility. The central airways are patent without endobronchial  lesion. There are a few patchy areas of groundglass density along with  the linear atelectatic changes demonstrated particularly within the  right upper and middle lobe. Small area of dense consolidation within  the medial right lower lobe stable to slightly improved from recent CT  on 01/08/2022.      No pathological axillary lymphadenopathy. The visualized upper abdomen  demonstrates pneumobilia. Partly imaged left renal cyst.       Impression:      1. No convincing evidence of pulmonary artery thromboembolism.  2. Few scattered areas of groundglass density  atelectasis and an area of  dense consolidation within the right medial lower lobe which is stable  to minimally improved from most recent CT abdomen on 01/08/2022.     Radiation dose reduction techniques were utilized, including automated  exposure control and exposure modulation based on body size.     This report was finalized on 1/12/2022 2:03 PM by Dr. Mendoza Guzman M.D.       CT Abdomen Pelvis With Contrast [[id number]] Collected: 01/08/22 0255     Updated: 01/08/22 0310    Narrative:      CT OF THE ABDOMEN AND PELVIS WITH CONTRAST     HISTORY: Abdominal pain, leukocytosis, and COVID.     COMPARISON: 08/20/2021     TECHNIQUE: Axial CT imaging was obtained through the abdomen and pelvis.  IV contrast was administered.     FINDINGS:  Images through the lung bases demonstrate areas of scarring. More focal  consolidation is noted within the right lower lobe. This area measures  up to 3.4 x 1.5 cm. It may represent pneumonia, but short-term CT  follow-up in 3 months is recommended to document resolution. The stomach  and duodenum appear unremarkable, as are the adrenal glands. Pancreas is  within normal limits for a 89-year-old patient. Calcified granulomata  are seen within the spleen. There is persistent intra and extrahepatic  biliary dilatation. Common bile duct measures up to 9 mm. This is stable  when compared to prior study. Patient did have pneumobilia the prior  exam, which is no longer seen. The degree of intrahepatic biliary  dilatation appears stable as well. Gallbladder is surgically absent. The  kidneys enhance symmetrically. Areas of cortical thinning are noted on  the left kidney, suggesting the sequela prior insults. There are  bilateral renal cysts. There is calcification of the aorta. Urinary  bladder is distended, and correlation with any symptoms of urinary  retention is recommended. Trace amount of free fluid is noted within the  pelvis. There is no evidence of bowel obstruction. There is no  evidence  of appendicitis. There is lumbar scoliosis, with convexity to the left.  The patient is osteoporotic. Compression deformity of L5 is stable, as  is an old sacral fracture. 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       Impression:         1. There is an area of airspace consolidation within the right lower  lobe. This could reflect pneumonia, but short-term CT follow-up to  document resolution is recommended.  2. Stable intra and extrahepatic biliary dilatation, although  pneumobilia is no longer seen.  3. Marked distention of the urinary bladder. Correlation with any  evidence of urinary retention is recommended.     Radiation dose reduction techniques were utilized, including automated  exposure control and exposure modulation based on body size.     This report was finalized on 1/8/2022 3:07 AM by Dr. Marixa Pat M.D.       XR Chest 1 View [[id number]] Collected: 01/08/22 0053     Updated: 01/08/22 0059    Narrative:      SINGLE VIEW OF THE CHEST     HISTORY: Cough. COVID diagnosis.     COMPARISON: None available.     FINDINGS:  Cardiomegaly is present. There is no vascular congestion. There is some  consolidation noted within the right midlung. This may reflect an area  of chronic scarring with associated bronchiectasis, although infiltrate  is not excluded. No pneumothorax is seen. No pleural effusion is  identified. No definite infiltrates are seen on the left.       Impression:      Bandlike area of consolidation noted within the right midlung.  Appearance is more suggestive of parenchymal scarring, although  infiltrate is not completely excluded. Short-term follow-up exam is  recommended.     This report was finalized on 1/8/2022 12:56 AM by Dr. Marixa Pat M.D.             Pertinent Labs     Results from last 7 days   Lab Units 01/13/22  0617 01/12/22  0520 01/11/22  0453 01/10/22  0454   WBC 10*3/mm3 10.41 10.80 10.33 11.76*   HEMOGLOBIN g/dL 12.0 12.2 11.7* 11.0*    PLATELETS 10*3/mm3 443 447 460* 389     Results from last 7 days   Lab Units 01/13/22 0617 01/12/22  0520 01/11/22  0453 01/10/22  0454   SODIUM mmol/L 134* 132* 132* 133*   POTASSIUM mmol/L 3.6 3.9 3.3* 4.4   CHLORIDE mmol/L 96* 95* 94* 94*   CO2 mmol/L 26.2 27.6 27.3 22.3   BUN mg/dL 8 8 10 10   CREATININE mg/dL 0.41* 0.36* 0.32* 0.38*   GLUCOSE mg/dL 84 82 90 95   Estimated Creatinine Clearance: 26.9 mL/min (A) (by C-G formula based on SCr of 0.41 mg/dL (L)).  Results from last 7 days   Lab Units 01/13/22  0617 01/12/22  0520 01/11/22  0453 01/10/22  0454   ALBUMIN g/dL 3.10* 3.10* 3.10* 3.30*   BILIRUBIN mg/dL 0.5 0.4 0.4 0.3   ALK PHOS U/L 67 67 65 68   AST (SGOT) U/L 13 14 14 17   ALT (SGPT) U/L 15 13 15 13     Results from last 7 days   Lab Units 01/13/22  0617 01/12/22  0520 01/11/22  0453 01/10/22  0454   CALCIUM mg/dL 8.7 8.7 8.7 8.9   ALBUMIN g/dL 3.10* 3.10* 3.10* 3.30*   MAGNESIUM mg/dL 1.6* 1.8 1.6* 1.9     Results from last 7 days   Lab Units 01/08/22  1635   LIPASE U/L 45     Results from last 7 days   Lab Units 01/13/22  0617 01/11/22  0453 01/09/22  1233 01/08/22  0037   TROPONIN T ng/mL  --   --   --  <0.010   D DIMER QUANT MCGFEU/mL 0.75* 1.00* 0.85*  --      Results from last 7 days   Lab Units 01/08/22  0114   SODIUM UR mmol/L 27   CHLORIDE UR mmol/L 23   OSMOLALITY UR mOsm/kg 312         Invalid input(s): LDLCALC  Results from last 7 days   Lab Units 01/09/22  1237 01/08/22  1635 01/08/22  1634 01/08/22  0053 01/08/22  0038   BLOODCX   --  No growth at 5 days No growth at 5 days No growth at 5 days Staphylococcus aureus, MRSA*   RESPCX  Light growth (2+) Staphylococcus aureus, MRSA*  Rare Normal Respiratory Regi  --   --   --   --    BCIDPCR   --   --   --   --  Staph aureus. mecA/C and MREJ (methicillin resistance gene) detected. Identification by BCID2 PCR.*       Test Results Pending at Discharge       Discharge Details        Discharge Medications      New Medications       Instructions Start Date   albuterol sulfate  (90 Base) MCG/ACT inhaler  Commonly known as: PROVENTIL HFA;VENTOLIN HFA;PROAIR HFA   2 puffs, Inhalation, Every 4 Hours PRN      budesonide-formoterol 160-4.5 MCG/ACT inhaler  Commonly known as: SYMBICORT   2 puffs, Inhalation, 2 Times Daily - RT      dilTIAZem 30 MG tablet  Commonly known as: CARDIZEM   30 mg, Oral, Daily   Start Date: January 14, 2022     ipratropium 17 MCG/ACT inhaler  Commonly known as: ATROVENT HFA   2 puffs, Inhalation, Every 4 Hours PRN      lactobacillus acidophilus capsule capsule   1 capsule, Oral, Daily   Start Date: January 14, 2022     linezolid 600 MG tablet  Commonly known as: Zyvox   600 mg, Oral, 2 Times Daily      linezolid 600 MG tablet  Commonly known as: Zyvox   600 mg, Oral, 2 Times Daily   Start Date: January 20, 2022        Changes to Medications      Instructions Start Date   acetaminophen 500 MG tablet  Commonly known as: TYLENOL  What changed: how much to take   500 mg, Oral, Every 6 Hours PRN         Continue These Medications      Instructions Start Date   amLODIPine 5 MG tablet  Commonly known as: NORVASC   5 mg, Oral, Daily      aspirin 81 MG EC tablet   81 mg, Oral, Daily      atorvastatin 40 MG tablet  Commonly known as: LIPITOR   40 mg, Oral, Daily      carvedilol 25 MG tablet  Commonly known as: COREG   25 mg, Oral, 2 Times Daily With Meals      DULoxetine 20 MG capsule  Commonly known as: CYMBALTA   30 mg, Oral, Daily, Taken at evening      estradiol 10 MCG tablet vaginal tablet  Commonly known as: VAGIFEM   1 tablet, Vaginal, 2 Times Weekly      folic acid 1 MG tablet  Commonly known as: FOLVITE   1 mg, Oral, Daily      hydrocortisone 25 MG suppository  Commonly known as: ANUSOL-HC   25 mg, Rectal, 2 Times Daily      latanoprost 0.005 % ophthalmic solution  Commonly known as: XALATAN   1 drop, Nightly      Melatonin 1 MG/4ML liquid   5 mg      multivitamin with minerals tablet tablet   1 tablet, Oral, Daily       pantoprazole 40 MG EC tablet  Commonly known as: PROTONIX   40 mg, Oral, Daily      timolol 0.25 % ophthalmic solution  Commonly known as: TIMOPTIC   1 drop, 2 Times Daily      traMADol 50 MG tablet  Commonly known as: ULTRAM   50 mg, Oral, Every 8 Hours PRN      traZODone 50 MG tablet  Commonly known as: DESYREL   50 mg, Oral, Nightly      vitamin B-12 1000 MCG tablet  Commonly known as: CYANOCOBALAMIN   1,000 mcg, Oral, Daily             Allergies   Allergen Reactions   • Bactrim [Sulfamethoxazole-Trimethoprim] GI Intolerance   • Doxycycline GI Intolerance   • Fosamax [Alendronate] GI Intolerance   • Lactose Intolerance (Gi) GI Intolerance   • Macrodantin [Nitrofurantoin] GI Intolerance   • Naproxen GI Intolerance   • Zestril [Lisinopril] GI Intolerance         Discharge Disposition:  Home or Self Care    Discharge Diet:  No active diet order      Discharge Activity:   Activity Instructions     Activity as Tolerated     Additional Activity Instructions:    As tolerated           CODE STATUS:    Code Status and Medical Interventions:   Ordered at: 01/08/22 0406     Code Status (Patient has no pulse and is not breathing):    CPR (Attempt to Resuscitate)     Medical Interventions (Patient has pulse or is breathing):    Full Support       Future Appointments   Date Time Provider Department Center   1/26/2022  2:30 PM Feliberto Fernández MD [facility]     Additional Instructions for the Follow-ups that You Need to Schedule     Ambulatory Referral to Home Health   As directed      Face to Face Visit Date: 1/13/2022    Follow-up provider for Plan of Care?: I treated the patient in an acute care facility and will not continue treatment after discharge.    Follow-up provider: KRISTA MANRIQUEZ [[id number]]    Reason/Clinical Findings: age related physical debility    Describe mobility limitations that make leaving home difficult: age related physical debility    Nursing/Therapeutic Services Requested: Physical Therapy     PT orders: Therapeutic exercise Home safety assessment Strengthening    Frequency: 1 Week 1            Contact information for follow-up providers     Hernan Cruz MD .    Specialty: Family Medicine  Contact information:  [address]  [facility] [address] [phone]                   Contact information for after-discharge care     Home Medical Care     [facility] .    Service: Home Health Services  Contact information:  [address]  [place] [address] [phone]                             Additional Instructions for the Follow-ups that You Need to Schedule     Ambulatory Referral to Home Health   As directed      Face to Face Visit Date: 1/13/2022    Follow-up provider for Plan of Care?: I treated the patient in an acute care facility and will not continue treatment after discharge.    Follow-up provider: HERNAN CRUZ [[id number]]    Reason/Clinical Findings: age related physical debility    Describe mobility limitations that make leaving home difficult: age related physical debility    Nursing/Therapeutic Services Requested: Physical Therapy    PT orders: Therapeutic exercise Home safety assessment Strengthening    Frequency: 1 Week 1           Time Spent on Discharge:  Greater than 30 minutes      Shayna Flanagan MD  [facility]  01/13/22  18:29 EST

## 2022-01-13 NOTE — PROGRESS NOTES
Bo Adan   89 y.o.  female    LOS: 5 days   Patient Care Team:  Hernan Cruz MD as PCP - General (Family Medicine)      Subjective     Interval History:     Patient Complaints: He denies any chest pain.  No complaints of any significant shortness of air.  She is anxious to be discharged .    Review of Systems:   No subjective fevers or chills    Medication Review:   Current Facility-Administered Medications:   •  acetaminophen (TYLENOL) tablet 650 mg, 650 mg, Oral, Q4H PRN, 650 mg at 01/12/22 1418 **OR** acetaminophen (TYLENOL) 160 MG/5ML solution 650 mg, 650 mg, Oral, Q4H PRN **OR** acetaminophen (TYLENOL) suppository 650 mg, 650 mg, Rectal, Q4H PRN, Abby Rodgers APRN  •  ipratropium (ATROVENT HFA) inhaler 2 puff, 2 puff, Inhalation, Q4H PRN **AND** albuterol sulfate HFA (PROVENTIL HFA;VENTOLIN HFA;PROAIR HFA) inhaler 2 puff, 2 puff, Inhalation, Q4H PRN, Abby Rodgers APRN  •  amLODIPine (NORVASC) tablet 5 mg, 5 mg, Oral, Daily, Tawanna Vaughn MD, 5 mg at 01/13/22 0953  •  aspirin EC tablet 81 mg, 81 mg, Oral, Daily, Tawanna Vaughn MD, 81 mg at 01/13/22 0952  •  atorvastatin (LIPITOR) tablet 40 mg, 40 mg, Oral, Nightly, Tawanna Vaughn MD, 40 mg at 01/12/22 2105  •  budesonide-formoterol (SYMBICORT) 160-4.5 MCG/ACT inhaler 2 puff, 2 puff, Inhalation, BID - RT, Tawanna Vaughn MD, 2 puff at 01/13/22 0729  •  calcium carbonate (TUMS) chewable tablet 500 mg (200 mg elemental), 2 tablet, Oral, BID PRN, Abby Rodgers APRN  •  carvedilol (COREG) tablet 25 mg, 25 mg, Oral, BID With Meals, Tawanna Vaughn MD, 25 mg at 01/13/22 0952  •  dilTIAZem (CARDIZEM) tablet 30 mg, 30 mg, Oral, Daily, Williams Au MD, 30 mg at 01/13/22 0952  •  DULoxetine (CYMBALTA) DR capsule 30 mg, 30 mg, Oral, Daily, Tawanna Vaughn MD, 30 mg at 01/13/22 0953  •  enoxaparin (LOVENOX) syringe 30 mg, 30 mg, Subcutaneous, Q24H, Tawanna Vaughn MD, 30 mg at 01/13/22 0952  •  folic acid  (FOLVITE) tablet 1 mg, 1 mg, Oral, Daily, Tawanna Vaughn MD, 1 mg at 01/13/22 0953  •  guaiFENesin (MUCINEX) 12 hr tablet 600 mg, 600 mg, Oral, Q12H, Tawanna Vaughn MD, 600 mg at 01/13/22 0953  •  hydrocortisone (ANUSOL-HC) suppository 25 mg, 25 mg, Rectal, BID, Tawanna Vaughn MD, 25 mg at 01/13/22 0953  •  lactobacillus acidophilus (RISAQUAD) capsule 1 capsule, 1 capsule, Oral, Daily, Petr Raymundo MD, 1 capsule at 01/13/22 0953  •  latanoprost (XALATAN) 0.005 % ophthalmic solution 1 drop, 1 drop, Both Eyes, Nightly, Tawanna Vaughn MD, 1 drop at 01/12/22 2105  •  Magnesium Sulfate 2 gram Bolus, followed by 8 gram infusion (total Mg dose 10 grams)- Mg less than or equal to 1mg/dL, 2 g, Intravenous, PRN **OR** Magnesium Sulfate 2 gram / 50mL Infusion (GIVE X 3 BAGS TO EQUAL 6GM TOTAL DOSE) - Mg 1.1 - 1.5 mg/dl, 2 g, Intravenous, PRN, Last Rate: 25 mL/hr at 01/08/22 1303, 2 g at 01/08/22 1303 **OR** Magnesium Sulfate 4 gram infusion- Mg 1.6-1.9 mg/dL, 4 g, Intravenous, PRN, Abby Rodgers APRN  •  nitroglycerin (NITROSTAT) SL tablet 0.4 mg, 0.4 mg, Sublingual, Q5 Min PRN, Abby Rodgers APRN  •  ondansetron (ZOFRAN) tablet 4 mg, 4 mg, Oral, Q6H PRN **OR** ondansetron (ZOFRAN) injection 4 mg, 4 mg, Intravenous, Q6H PRN, Abby Rodgers APRN  •  pantoprazole (PROTONIX) EC tablet 40 mg, 40 mg, Oral, Daily, Petr Raymundo MD, 40 mg at 01/13/22 0953  •  Pharmacy to dose vancomycin, , Does not apply, Continuous PRN, Eliza Andrews, APRN  •  potassium chloride (K-DUR,KLOR-CON) ER tablet 40 mEq, 40 mEq, Oral, PRN, 40 mEq at 01/08/22 1301 **OR** potassium chloride (KLOR-CON) packet 40 mEq, 40 mEq, Oral, PRN **OR** potassium chloride 10 mEq in 100 mL IVPB, 10 mEq, Intravenous, Q1H PRN, Abby Rodgers, APRN  •  [COMPLETED] Insert peripheral IV, , , Once **AND** sodium chloride 0.9 % flush 10 mL, 10 mL, Intravenous, PRN, Petr Rayo, PA  •  sodium chloride 0.9 % flush 10 mL, 10  mL, Intravenous, Q12H, Abby Rodgers APRN, 10 mL at 01/13/22 1000  •  sodium chloride 0.9 % flush 10 mL, 10 mL, Intravenous, PRN, Abby Rodgers APRN  •  timolol (TIMOPTIC) 0.25 % ophthalmic solution 1 drop, 1 drop, Both Eyes, Daily, Tawanna Vaughn MD, 1 drop at 01/13/22 0952  •  traMADol (ULTRAM) tablet 50 mg, 50 mg, Oral, Q8H PRN, Tawanna Vaughn MD  •  traZODone (DESYREL) tablet 50 mg, 50 mg, Oral, Nightly, Tawanna Vaughn MD, 50 mg at 01/12/22 2105  •  vancomycin 750 mg/250 mL 0.9% NS IVPB ([facility]), 750 mg, Intravenous, Q12H, Eliza Andrews APRN, 750 mg at 01/13/22 1112      Objective     Vital Sign Min/Max for last 24 hours  Temp  Min: 96.8 °F (36 °C)  Max: 97.1 °F (36.2 °C)   BP  Min: 116/72  Max: 147/70    Pulse  Min: 75  Max: 129     Wt Readings from Last 3 Encounters:   01/11/22 37.2 kg (82 lb 0.2 oz)   11/24/21 40.8 kg (90 lb)   11/02/21 41.7 kg (92 lb)        Intake/Output Summary (Last 24 hours) at 1/13/2022 1115  Last data filed at 1/13/2022 0816  Gross per 24 hour   Intake 240 ml   Output 650 ml   Net -410 ml     Physical Exam:      Physical exam due to COVID and preserve PPE                                                       Monitor:  nsr  Results Review:     I reviewed the patient's new clinical results.    Sodium Sodium   Date Value Ref Range Status   01/13/2022 134 (L) 136 - 145 mmol/L Final   01/12/2022 132 (L) 136 - 145 mmol/L Final   01/11/2022 132 (L) 136 - 145 mmol/L Final      Potassium Potassium   Date Value Ref Range Status   01/13/2022 3.6 3.5 - 5.2 mmol/L Final   01/12/2022 3.9 3.5 - 5.2 mmol/L Final   01/11/2022 3.3 (L) 3.5 - 5.2 mmol/L Final      Chloride Chloride   Date Value Ref Range Status   01/13/2022 96 (L) 98 - 107 mmol/L Final   01/12/2022 95 (L) 98 - 107 mmol/L Final   01/11/2022 94 (L) 98 - 107 mmol/L Final      Bicarbonate No results found for: PLASMABICARB   BUN BUN   Date Value Ref Range Status   01/13/2022 8 8 - 23 mg/dL Final   01/12/2022 8 8 -  23 mg/dL Final   01/11/2022 10 8 - 23 mg/dL Final      Creatinine Creatinine   Date Value Ref Range Status   01/13/2022 0.41 (L) 0.57 - 1.00 mg/dL Final   01/12/2022 0.36 (L) 0.57 - 1.00 mg/dL Final   01/11/2022 0.32 (L) 0.57 - 1.00 mg/dL Final      Calcium Calcium   Date Value Ref Range Status   01/13/2022 8.7 8.2 - 9.6 mg/dL Final   01/12/2022 8.7 8.2 - 9.6 mg/dL Final   01/11/2022 8.7 8.2 - 9.6 mg/dL Final      Magnesium Magnesium   Date Value Ref Range Status   01/13/2022 1.6 (L) 1.7 - 2.3 mg/dL Final   01/12/2022 1.8 1.7 - 2.3 mg/dL Final   01/11/2022 1.6 (L) 1.7 - 2.3 mg/dL Final        Results from last 7 days   Lab Units 01/13/22  0617   WBC 10*3/mm3 10.41   HEMOGLOBIN g/dL 12.0   HEMATOCRIT % 34.7   PLATELETS 10*3/mm3 443     Lab Results   Lab Value Date/Time    TROPONINT <0.010 01/08/2022 0037    TROPONINT <0.010 05/12/2021 1607     No results found for: CHOL  Lab Results   Component Value Date    HDL 65 04/05/2019    HDL 55 10/02/2018    HDL 50 03/28/2018     Lab Results   Component Value Date    LDL 84 04/05/2019    LDL 92 10/02/2018    LDL 98 03/28/2018     Lab Results   Component Value Date    TRIG 108 04/05/2019    TRIG 224 (H) 10/02/2018    TRIG 138 03/28/2018     No components found for: CHOLHDL            Echo EF Estimated  No results found for: ECHOEFEST       Assessment/ Plan  1. Chronic systolic CHF with EF 40% per Echo on 1/8/22      A. Echo 1/8/22: Calculated left ventricular EF = 40% Estimated left ventricular EF was in agreement with the calculated left ventricular EF. Left ventricular systolic function is moderately decreased. The following left ventricular wall segments are hypokinetic: mid inferolateral, mid inferior, basal inferoseptal, basal inferior and basal inferoseptal.      B. Echo 2021: TTE at that time showed EF 48% with mild septal hypokinesis.       2. MRSA PNA and bacteremia  3. COVID 19  4. HTN  5. Acute on chronic abdominal pain  6. Heme positive stool with mild anemia, HgB  stable  7.  HLD  8. ACE allergy   Plan #1 sinus rhythm on the monitor rate about 100.  Patient borderline at present 143/90.  Has been on the low side at times.  Would continue current cardiac meds.  #2 small pericardial effusion on echo.  Not of clinical significance at present.  #3 patient has no complaints of shortness of air of any significance.  Her O2 sat is 98% on room air.  #4 I do not see anything else to do from our standpoint at this time.  Cleared for discharge by infectious disease.  For cardiac follow-up she wants to follow-up with some someone out in this area.  I gave her Dr. Au name and phone number to call to make an appointment for outpatient follow-up.    Shamar Root MD  01/13/22  11:15 EST      Time: 17 min

## 2022-01-13 NOTE — PROGRESS NOTES
ID PROGRESS NOTE    CC: Follow-up sepsis    S:   Feels better. Tachycardic but No o2 requirement.  No fevers or chills or night sweats    O:  Physical Exam:  Temp:  [96.8 °F (36 °C)-97.1 °F (36.2 °C)] 97.1 °F (36.2 °C)  Heart Rate:  [] 104  Resp:  [16] 16  BP: (116-147)/() 136/105  Physical Exam  GENERAL: Awake and alert, sickly  LUNGS: with normal respiratory effort.   SKIN: Warm and dry without cutaneous eruptions   PSYCHIATRIC: Appropriate mood, affect, insight, and judgment.       Diagnostics:    White count 10.41  Cr 0.41    TTE with ejection fraction of 40% multiple hypokinetic segments, grade 1 diastolic dysfunction, moderate calcification of the aortic valve, mild TR and circumferential pericardial effusion    1/8 blood cultures 1/2 MRSA   1/8 blood culture 2/2 no growth to date  1/9 respiratory culture 2+ MRSA    Assessment/Plan   1.  MRSA septicemia from right lower lobe MRSA pneumonia, likely secondary bacterial infection from #2  2.  COVID-19 infection, first tested positive on January 4  3.  Leukocytosis, resolved  4.  Calcification of aortic valve  5.  Pericardial effusion       As discussed yesterday, I would recommend we keep her on vancomycin while inpatient.  At discharge she can be switched over to linezolid 600 mg p.o. every 12 x10 days.    Thanks to pharmacy for pricing medication.  Seems best option is to pay out-of-pocket and not use insurance  No objection to discharge when okay with others.  Margarito Coon MD  01/13/22

## 2022-01-13 NOTE — PLAN OF CARE
Goal Outcome Evaluation:  Plan of Care Reviewed With: patient        Progress: improving  Outcome Summary: Pt VSS, AOx 4 make her need knos able to walk with assistante to bedside commode hemmorroid noted apply Anusol suppository.C/o Headache given tylenol patient will be discharge home in company of daughter, given instruction to daughter to continue antibiotic x 10 days twice a day. Today received Vanconycin antibiotic anf after that discontinue peripheral IV.

## 2022-01-13 NOTE — PROGRESS NOTES
"[facility] Clinical Pharmacy Services: Vancomycin Monitoring Note    Bo Adan is a 89 y.o. female who is on day 3/5 of pharmacy to dose vancomycin for MRSA pneumonia .    Previous Vancomycin Dose:   750 mg IV every  12  Updated Cultures and Sensitivities: 1/8 blood cultures 1/2 MRSA 1/8 blood cultures no growth  1/9 sputum w/ MRSA     Lab Results   Component Value Date    VANCOTROUGH 12.60 01/12/2022    VANCORANDOM 7.70 01/10/2022       Vitals/Labs  Ht: 152.4 cm (60\"); Wt: 37.2 kg (82 lb 0.2 oz)   Temp Readings from Last 1 Encounters:   01/13/22 97.1 °F (36.2 °C) (Oral)     Estimated Creatinine Clearance: 26.9 mL/min (A) (by C-G formula based on SCr of 0.41 mg/dL (L)).        Results from last 7 days   Lab Units 01/13/22  0617 01/12/22  0520 01/11/22  0453   CREATININE mg/dL 0.41* 0.36* 0.32*   WBC 10*3/mm3 10.41 10.80 10.33     Assessment/Plan  Vanc level and AUC at goal, renal fxn is stable   Current Vancomycin Dose: 750 mg IV q12h for    Next Level Date and Time: vt 1/14 1130  We will continue to monitor patient changes and renal function     Thank you for involving pharmacy in this patient's care. Please contact pharmacy with any questions or concerns.       Kaci Patel [facility]  Clinical Pharmacist              "

## 2022-01-14 ENCOUNTER — HOME CARE VISIT (OUTPATIENT)
Dept: HOME HEALTH SERVICES | Facility: HOME HEALTHCARE | Age: 87
End: 2022-01-14

## 2022-01-14 ENCOUNTER — READMISSION MANAGEMENT (OUTPATIENT)
Dept: CALL CENTER | Facility: HOSPITAL | Age: 87
End: 2022-01-14

## 2022-01-14 PROCEDURE — G0299 HHS/HOSPICE OF RN EA 15 MIN: HCPCS

## 2022-01-14 NOTE — PROGRESS NOTES
Case Management Discharge Note      Final Note: home with [facility] HH and family and private pay assist    Provided Post Acute Provider List?: N/A  N/A Provider List Comment: referral to [facility] per pt's request  Provided Post Acute Provider Quality & Resource List?: N/A    Selected Continued Care - Discharged on 1/13/2022 Admission date: 1/8/2022 - Discharge disposition: Home or Self Care    Destination    No services have been selected for the patient.              Durable Medical Equipment    No services have been selected for the patient.              Dialysis/Infusion    No services have been selected for the patient.              Home Medical Care Coordination complete.    Service Provider Selected Services Address Phone Fax Patient Preferred    [facility]  Home Health Services [address] [address] [phone] --          Therapy    No services have been selected for the patient.              Community Resources    No services have been selected for the patient.              Community & DME    No services have been selected for the patient.                  Transportation Services  Private: Car    Final Discharge Disposition Code: 06 - home with home health care

## 2022-01-14 NOTE — OUTREACH NOTE
Prep Survey      Responses   [facility] patient discharged from? [place]   Is LACE score < 7 ? No   Emergency Room discharge w/ pulse ox? No   Eligibility Readm Mgmt   Discharge diagnosis Pneumonia of right lower lobe due to methicillin resistant    COVID19  Severe malnutrition    Does the patient have one of the following disease processes/diagnoses(primary or secondary)? COVID-19   Does the patient have Home health ordered? Yes   What is the Home health agency?  [facility]     Is there a DME ordered? No   Prep survey completed? Yes          Ludy Juarez RN

## 2022-01-14 NOTE — OUTREACH NOTE
COVID-19 Week 1 Survey      Responses   [facility] patient discharged from? [place]   Does the patient have one of the following disease processes/diagnoses(primary or secondary)? COVID-19   COVID-19 underlying condition? CHF   Call Number Call 1   Week 1 Call successful? Yes   Call start time 1458   Call end time 1509   General alerts for this patient Please call patient's daughter, Mary, for updates.   Is patient permission given to speak with other caregiver? Yes   List who call center can speak with Mary   Person spoke with today (if not patient) and relationship Mary-daughter   Meds reviewed with patient/caregiver? Yes   Is the patient having any side effects they believe may be caused by any medication additions or changes? No   Does the patient have all medications ordered at discharge? Yes   Is the patient taking all medications as directed (includes completed medication regime)? Yes   Does the patient have a primary care provider?  Yes   Does the patient have an appointment with their PCP or specialist within 7 days of discharge? No   What is preventing the patient from scheduling follow up appointments within 7 days of discharge? Haven't had time   Nursing Interventions Advised patient to make appointment,  Educated patient on importance of making appointment   Has the patient kept scheduled appointments due by today? N/A   Has home health visited the patient within 72 hours of discharge? Yes   Home health comments  nurse visited today.   Psychosocial issues? No   Psychosocial comments Daughter and  are helping patient.   Did the patient receive a copy of their discharge instructions? Yes   Did the patient receive a copy of COVID-19 specific instructions? Yes   Nursing interventions Reviewed instructions with patient   What is the patient's perception of their health status since discharge? Improving   Does the patient have any of the following symptoms? Cough  [Loose stools at  night. Slight productive cough-improving.]   Nursing Interventions Nurse provided patient education   Pulse Ox monitoring Intermittent   Pulse Ox device source Patient   O2 Sat comments 96% on RA.   O2 Sat: education provided Sat levels,  Monitoring frequency,  When to seek care   O2 Sat education comments Jackie hills will return to ER if O2 sats remain below 90%.   Is the patient/caregiver able to teach back steps to recovery at home? Set small, achievable goals for return to baseline health,  Eat a well-balance diet,  Rest and rebuild strength, gradually increase activity   If the patient is a current smoker, are they able to teach back resources for cessation? Not a smoker   Is the patient/caregiver able to teach back the hierarchy of who to call/visit for symptoms/problems? PCP, Specialist, Home health nurse, Urgent Care, ED, 911 Yes   Does the patient have scales? Yes   Is the patient weighing daily? No  [Refuses to weigh daily.]   Daily weight interventions Education provided on importance of daily weight   Is the patient able to teach back Heart Failure diet management? Yes   Is the patient able to teach back signs and symptoms of worsening condition? (i.e. weight gain, shortness of air, etc.) Yes   COVID-19 call completed? Yes   Wrap up additional comments Jackie hills is improving.  still has occasional productive cough, occasional loose stools.  has good appetite. Has completed quarantine. Denies any needs today.          Marcia Whiteside RN

## 2022-01-15 VITALS
TEMPERATURE: 97.8 F | HEART RATE: 82 BPM | OXYGEN SATURATION: 91 % | DIASTOLIC BLOOD PRESSURE: 70 MMHG | SYSTOLIC BLOOD PRESSURE: 118 MMHG

## 2022-01-16 ENCOUNTER — READMISSION MANAGEMENT (OUTPATIENT)
Dept: CALL CENTER | Facility: HOSPITAL | Age: 87
End: 2022-01-16

## 2022-01-16 NOTE — HOME HEALTH
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   She is up with walker and falls safety reviewed.   patient also has hx of CHF and HTN.

## 2022-01-16 NOTE — CASE COMMUNICATION
SOC 1/14  DIAG: COVID/ PNEUMONIA, SEPSIS, LOW BACK PAIN, ACUTE HYPOXEMIA, RESP FAILURE, HOX OF DM, DVT, ON ELIQUIS.  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   She is up with walker and falls safety reviewed.   patient also has hx of CHF and HTN.

## 2022-01-16 NOTE — OUTREACH NOTE
COVID-19 Week 1 Survey      Responses   [facility] patient discharged from? [place]   Does the patient have one of the following disease processes/diagnoses(primary or secondary)? COVID-19   COVID-19 underlying condition? None   Call Number Call 2   Week 1 Call successful? Yes   Call start time 0848   Call end time 0858   Discharge diagnosis Pneumonia of right lower lobe due to methicillin resistant Staphylococcus aureus /COVID-19    What is the patient's perception of their health status since discharge? New symptoms unrelated to diagnosis  [patient is confused slightly this morning]   Does the patient have any of the following symptoms? None   Pulse Ox monitoring Intermittent   O2 Sat comments 98% pulse ox this morning   O2 Sat: education provided When to seek care  [will be calling her pcp in the morning and discussed the FAST  with the daughter]   Is the patient/caregiver able to teach back the hierarchy of who to call/visit for symptoms/problems? PCP, Specialist, Home health nurse, Urgent Care, ED, 911 Yes   COVID-19 call completed? Yes   Wrap up additional comments daughter says mother is slightly confused this morning. not other symptoms. will be speaking with PCP in the morning          Latrice Ibanez RN

## 2022-01-17 ENCOUNTER — READMISSION MANAGEMENT (OUTPATIENT)
Dept: CALL CENTER | Facility: HOSPITAL | Age: 87
End: 2022-01-17

## 2022-01-17 ENCOUNTER — HOME CARE VISIT (OUTPATIENT)
Dept: HOME HEALTH SERVICES | Facility: HOME HEALTHCARE | Age: 87
End: 2022-01-17

## 2022-01-17 PROCEDURE — G0300 HHS/HOSPICE OF LPN EA 15 MIN: HCPCS

## 2022-01-17 NOTE — OUTREACH NOTE
COVID-19 Week 1 Survey      Responses   [facility] patient discharged from? [place]   Does the patient have one of the following disease processes/diagnoses(primary or secondary)? COVID-19   COVID-19 underlying condition? None   Call Number Call 3   Week 1 Call successful? Yes   Call start time 0923   Call end time 0934   Discharge diagnosis Pneumonia of right lower lobe due to methicillin resistant Staphylococcus aureus /COVID-19    Medication alerts for this patient Pt continues to take antibiotics.   Home health comments Pt also has a caretaker.   What is the patient's perception of their health status since discharge? Same   COVID-19 call completed? Yes   Wrap up additional comments Daughter reports pt continues to be slighty confused. HH is visiting. Daughter is a medical assistant. HH will retrieve urine specimen for possible UTI. Daughter waiting on return call from cardiology.          Sabiha Anguiano RN

## 2022-01-18 ENCOUNTER — HOME CARE VISIT (OUTPATIENT)
Dept: HOME HEALTH SERVICES | Facility: HOME HEALTHCARE | Age: 87
End: 2022-01-18

## 2022-01-18 VITALS
DIASTOLIC BLOOD PRESSURE: 76 MMHG | TEMPERATURE: 96.9 F | HEART RATE: 90 BPM | SYSTOLIC BLOOD PRESSURE: 126 MMHG | RESPIRATION RATE: 16 BRPM | OXYGEN SATURATION: 97 %

## 2022-01-18 VITALS
HEART RATE: 84 BPM | SYSTOLIC BLOOD PRESSURE: 124 MMHG | DIASTOLIC BLOOD PRESSURE: 76 MMHG | TEMPERATURE: 95.6 F | OXYGEN SATURATION: 96 %

## 2022-01-18 PROCEDURE — G0151 HHCP-SERV OF PT,EA 15 MIN: HCPCS

## 2022-01-18 NOTE — HOME HEALTH
REASON FOR REFERRAL: decreased ability to ambulate in and out of the home following hospitalization due to Covid and pneumonia    MEDICAL HISTORY: Patient reports having bloodwork done at MD office and received a call to go to the hospital because her white blood count was low. Was found to have pneumonia and covid.    SKILLED PHYSICAL THERAPY IS MEDICALLY NECESSARY FOR: remediation of decreased strength, decreased transfers, decreased ambulatory ability, increased risk of falls

## 2022-01-19 ENCOUNTER — READMISSION MANAGEMENT (OUTPATIENT)
Dept: CALL CENTER | Facility: HOSPITAL | Age: 87
End: 2022-01-19

## 2022-01-19 NOTE — HOME HEALTH
The patient lives with her . A&Ox3 denies falls, and pain.  c/o soa on exertion compliant with medication no adverse reaction.

## 2022-01-20 ENCOUNTER — HOME CARE VISIT (OUTPATIENT)
Dept: HOME HEALTH SERVICES | Facility: HOME HEALTHCARE | Age: 87
End: 2022-01-20

## 2022-01-20 VITALS
HEART RATE: 81 BPM | OXYGEN SATURATION: 98 % | DIASTOLIC BLOOD PRESSURE: 68 MMHG | TEMPERATURE: 96.8 F | SYSTOLIC BLOOD PRESSURE: 126 MMHG

## 2022-01-20 PROCEDURE — G0151 HHCP-SERV OF PT,EA 15 MIN: HCPCS

## 2022-01-20 PROCEDURE — G0299 HHS/HOSPICE OF RN EA 15 MIN: HCPCS

## 2022-01-20 NOTE — HOME HEALTH
Subjective: Patient states she is tired because she is having to go to the bathroom all the time. Caregiver states the antibiotic is giving her diarrhea but she is almost finished with it.    Assessment: Patient is able to complete exercises and ambulation for short distances but c/o fatigue post activity     Plan for next visit/Communication  Therapeutic exercises  Gait training  Fall risk education  Transfer training

## 2022-01-22 ENCOUNTER — READMISSION MANAGEMENT (OUTPATIENT)
Dept: CALL CENTER | Facility: HOSPITAL | Age: 87
End: 2022-01-22

## 2022-01-22 NOTE — OUTREACH NOTE
COVID-19 Week 2 Survey      Responses   [facility] patient discharged from? [place]   Does the patient have one of the following disease processes/diagnoses(primary or secondary)? COVID-19   COVID-19 underlying condition? None   Call Number Call 1   COVID-19 Week 2: Call 1 attempt successful? No   Discharge diagnosis Pneumonia of right lower lobe due to methicillin resistant Staphylococcus aureus /COVID-19           Venus Plummer LPN

## 2022-01-23 VITALS
HEART RATE: 90 BPM | TEMPERATURE: 97.8 F | DIASTOLIC BLOOD PRESSURE: 76 MMHG | SYSTOLIC BLOOD PRESSURE: 136 MMHG | OXYGEN SATURATION: 97 % | RESPIRATION RATE: 18 BRPM

## 2022-01-23 NOTE — HOME HEALTH
DIAG: COVID/ PNEUMONIA, SEPSIS, LOW BACK PAIN, ACUTE HYPOXEMIA, RESP FAILURE, HOX OF DM, DVT, ON ELIQUIS. Hx of CHF and HTN.            Plan: CP assess, med compliance, skin assess with STG I to coccyx.

## 2022-01-24 ENCOUNTER — HOME CARE VISIT (OUTPATIENT)
Dept: HOME HEALTH SERVICES | Facility: HOME HEALTHCARE | Age: 87
End: 2022-01-24

## 2022-01-24 VITALS
OXYGEN SATURATION: 96 % | DIASTOLIC BLOOD PRESSURE: 80 MMHG | RESPIRATION RATE: 16 BRPM | HEART RATE: 82 BPM | SYSTOLIC BLOOD PRESSURE: 126 MMHG | TEMPERATURE: 96.9 F

## 2022-01-24 PROCEDURE — G0300 HHS/HOSPICE OF LPN EA 15 MIN: HCPCS

## 2022-01-25 ENCOUNTER — TELEPHONE (OUTPATIENT)
Dept: GASTROENTEROLOGY | Facility: CLINIC | Age: 87
End: 2022-01-25

## 2022-01-25 ENCOUNTER — HOME CARE VISIT (OUTPATIENT)
Dept: HOME HEALTH SERVICES | Facility: HOME HEALTHCARE | Age: 87
End: 2022-01-25

## 2022-01-25 VITALS
SYSTOLIC BLOOD PRESSURE: 104 MMHG | OXYGEN SATURATION: 97 % | HEART RATE: 89 BPM | DIASTOLIC BLOOD PRESSURE: 66 MMHG | TEMPERATURE: 96.4 F

## 2022-01-25 PROCEDURE — G0151 HHCP-SERV OF PT,EA 15 MIN: HCPCS

## 2022-01-25 NOTE — TELEPHONE ENCOUNTER
Please call her. I don't know which stool test she is asking about. We can discuss in more detail when I see her next.        I am happy to do a TeleHealth visit but I will only do it at the end of the day since they take so much longer that in person visits. Thx.kjh

## 2022-01-25 NOTE — HOME HEALTH
Subjective: Patient states she is feeling stronger but still having diarrhea.    Assessment: Patient is able to increase repetitions of exercises and increase distance with ambulation but c/o fatigue post activity. Patient c/o headache immediately post walk but reports it going away after a few minutes.    Plan for next visit/Communication  Therapeutic exercises  Gait training  Fall risk education  Transfer training

## 2022-01-25 NOTE — TELEPHONE ENCOUNTER
----- Message from Larry Bernal sent at 1/25/2022  8:09 AM EST -----  Regarding: call back/schedule/inquiry  Contact: [phone]  PT called, wants to know if she can change tomorrows appointment to Telehealth. PT also wants to know if she can bring in a stool sample to be tested today that could be detrimental to her diagnosis. PT request call back today [phone]. Please advise.

## 2022-01-26 ENCOUNTER — OFFICE VISIT (OUTPATIENT)
Dept: GASTROENTEROLOGY | Facility: CLINIC | Age: 87
End: 2022-01-26

## 2022-01-26 VITALS — WEIGHT: 85 LBS | HEIGHT: 60 IN | BODY MASS INDEX: 16.69 KG/M2

## 2022-01-26 DIAGNOSIS — R10.33 PERIUMBILICAL ABDOMINAL PAIN: ICD-10-CM

## 2022-01-26 DIAGNOSIS — E43 SEVERE MALNUTRITION: Primary | ICD-10-CM

## 2022-01-26 DIAGNOSIS — R19.7 DIARRHEA OF PRESUMED INFECTIOUS ORIGIN: ICD-10-CM

## 2022-01-26 DIAGNOSIS — K21.9 GERD WITHOUT ESOPHAGITIS: ICD-10-CM

## 2022-01-26 DIAGNOSIS — R19.5 OCCULT GI BLEEDING: ICD-10-CM

## 2022-01-26 DIAGNOSIS — Z87.19 HISTORY OF PANCREATITIS: ICD-10-CM

## 2022-01-26 PROCEDURE — 99214 OFFICE O/P EST MOD 30 MIN: CPT | Performed by: INTERNAL MEDICINE

## 2022-01-26 NOTE — PROGRESS NOTES
Chief Complaint   Patient presents with   • Diarrhea   • Rectal Urgency   • Constipation       History of Present Illness: The patient and daughter understand that this is a Tele Visit and want this to be a TeleVisit.   89 y.o. female  with a history of hypertension, cervical cancer with treatment by radiotherapy, GERD, osteoarthritis and recent COVID-19 (diagnosed earlier this month) who was admitted 1/8/22 thru 1/13/22 at [facility] and her discharge summary said:  Active Hospital Problems     Diagnosis   POA   • **Pneumonia of right lower lobe due to methicillin resistant Staphylococcus aureus (MRSA) ([facility]) [J15.212]   Yes   • Chronic systolic CHF (congestive heart failure) ([facility]) [I50.22]   Yes   • Severe malnutrition (CMS/HCC) [E43]   Yes   • Occult GI bleeding [R19.5]   Yes   • Hypokalemia [E87.6]   Yes   • Hypomagnesemia [E83.42]   Yes   • Essential hypertension [I10]   Yes   • GERD without esophagitis [K21.9]   Yes   • History of cervical cancer [Z85.41]   Not Applicable   • HLD (hyperlipidemia) [E78.5]           Dr. RUTHIE Corley was the last GI MD in our group to see the patient when she was in [facility] earlier this month on 1/10/22 and he had said:  Assessment:   1.  Abdominal pain, acute on chronic.  Appears to be at baseline  2.  Mild anemia  3.  Heme positive stool  4.  COVID 19 PNA     All problems new to me today     Plan:   Abdominal pain seems to be at baseline.  She has hx of IBS-m followed by Dr Fernández.  Hb minimally depressed, no e/o overt bleeding.  Given age and COVID 19 PNA, no plans for endoscopic evaluation at this time.  She can f/u in office with Dr Fernández upon discharge.      GI will see again as needed      Dr. Christi Benjamin had seen the patient on 1/9/2022 after she had been admitted with COVID-19 pneumonia and we were asked to see because of abdominal pain and heme positive stool.  Dr. Preston consult had said:  Impression  1.  Acute on chronic abdominal pain: Various locations.  I suspect these are her  chronic issues flared up by her COVID infection.  No new or significant gastrointestinal findings on her 1/8/2022 CT scan     2.  Chronic IBS with both diarrhea and constipation     3.  Right-sided pneumonia with history of COVID-19     4.  Heme positive stool: Her hemoglobin is quite stable and iron studies suggestive of chronic disease picture.  She also has a history of radiation therapy to the cervix which could certainly lead to issues with radiation proctitis     Plan  Continue supportive care for COVID-19 infection/pneumonia  Continue PPI  Continue as needed Anusol suppositories  Add in twice daily calcium polycarbophil for stool bulking  Follow hemoglobin and monitor stools.  There is no indication for endoscopy.  This could be addressed in the outpatient setting with Dr. Fernández        I had last seen the patient in the office in 11 of 2021.  My assessment and plan at that time was as follows:  Assessment:  1. Rectal pain, better with Anusol HC Suppositories  2. Alternating diarhea and constipation.  3. She is lactose intolerant.   4. Weight loss.     Recommendations:  1. Take anusol HC suppositories 1/day.  2. Take fiber one/day.  3. She prefers no colonoscopy now.   4. Labs: Celiac sprue antibody panel. She doesn't want this test now.   5. F/u 8 weeks.   6. Take a supplement daily. She doesn't want to.         She had an EGD and colonoscopy done at least 10 years ago.   I talked to Mary, her daughter, at the patient's house. She had heme positive stool in the hospital. She has brain fog from COVID. She has stool small accidents with uncontrollable BM's. She is off of antibiotics x 2 days and things are getting better. She also feels like she has to urinate but doesn't. She has been off of antibiotics for two days. No rectal bleeding or melena.  The daughter and her brothers are spending time with their mom. She was having 8 small BM/day. Today she has had 5-6 BM/day. Fiber was added back two days ago. No  abdominal pain. Some rectal spasms (?).     Past Medical History:   Diagnosis Date   • Arthritis    • Cancer (HCC)     cervical CA 2018 finished radiation   • Chronic systolic CHF (congestive heart failure) (HCC) 1/11/2022   • Elevated cholesterol    • Enteritis    • GERD (gastroesophageal reflux disease)    • Hypertension    • Pneumonia        Past Surgical History:   Procedure Laterality Date   • CHOLECYSTECTOMY     • COLONOSCOPY     • ENDOSCOPY     • FRACTURE SURGERY      feb 23 2021         Current Outpatient Medications:   •  acetaminophen (TYLENOL) 500 MG tablet, Take 1 tablet by mouth Every 6 (Six) Hours As Needed for Mild Pain ., Disp: 30 tablet, Rfl: 0  •  aspirin 81 MG EC tablet, Take 81 mg by mouth Daily., Disp: , Rfl:   •  dilTIAZem (CARDIZEM) 30 MG tablet, Take 1 tablet by mouth Daily., Disp: 30 tablet, Rfl: 0  •  DULoxetine (CYMBALTA) 20 MG capsule, Take 30 mg by mouth Daily. Taken at evening , Disp: , Rfl:   •  folic acid (FOLVITE) 1 MG tablet, Take 1 mg by mouth Daily., Disp: , Rfl:   •  hydrocortisone (ANUSOL-HC) 25 MG suppository, Insert 1 suppository into the rectum 2 (Two) Times a Day., Disp: 60 suppository, Rfl: 1  •  latanoprost (XALATAN) 0.005 % ophthalmic solution, 1 drop Every Night., Disp: , Rfl:   •  Melatonin 1 MG/4ML liquid, 5 mg., Disp: , Rfl:   •  multivitamin with minerals (MULTIVITAMIN ADULT PO), Take 1 tablet by mouth Daily., Disp: , Rfl:   •  pantoprazole (PROTONIX) 40 MG EC tablet, Take 40 mg by mouth Daily., Disp: , Rfl:   •  timolol (TIMOPTIC) 0.25 % ophthalmic solution, 1 drop 2 (Two) Times a Day., Disp: , Rfl:   •  traMADol (ULTRAM) 50 MG tablet, Take 50 mg by mouth Every 8 (Eight) Hours As Needed for Moderate Pain ., Disp: , Rfl:   •  traZODone (DESYREL) 50 MG tablet, Take 50 mg by mouth Every Night., Disp: , Rfl:   •  vitamin B-12 (CYANOCOBALAMIN) 1000 MCG tablet, Take 1,000 mcg by mouth Daily., Disp: , Rfl:   •  albuterol sulfate  (90 Base) MCG/ACT inhaler, Inhale 2  puffs Every 4 (Four) Hours As Needed for Wheezing., Disp: 6.7 g, Rfl: 0  •  amLODIPine (NORVASC) 5 MG tablet, Take 5 mg by mouth Daily., Disp: , Rfl:   •  atorvastatin (LIPITOR) 40 MG tablet, Take 40 mg by mouth Daily., Disp: , Rfl:   •  budesonide-formoterol (SYMBICORT) 160-4.5 MCG/ACT inhaler, Inhale 2 puffs 2 (Two) Times a Day., Disp: 10.2 g, Rfl: 0  •  carvedilol (COREG) 25 MG tablet, Take 1 tablet by mouth 2 (Two) Times a Day With Meals for 30 days., Disp: 60 tablet, Rfl: 0  •  estradiol (VAGIFEM) 10 MCG tablet vaginal tablet, Insert 1 tablet into the vagina 2 (Two) Times a Week., Disp: , Rfl:   •  ipratropium (ATROVENT HFA) 17 MCG/ACT inhaler, Inhale 2 puffs Every 4 (Four) Hours As Needed for Wheezing., Disp: 12.9 g, Rfl: 0  •  lactobacillus acidophilus (RISAQUAD) capsule capsule, Take 1 capsule by mouth Daily., Disp: 30 capsule, Rfl: 0  •  linezolid (Zyvox) 600 MG tablet, Take 1 tablet by mouth 2 (Two) Times a Day., Disp: 6 tablet, Rfl: 0    Allergies   Allergen Reactions   • Bactrim [Sulfamethoxazole-Trimethoprim] GI Intolerance   • Doxycycline GI Intolerance   • Fosamax [Alendronate] GI Intolerance   • Lactose Intolerance (Gi) GI Intolerance   • Macrodantin [Nitrofurantoin] GI Intolerance   • Naproxen GI Intolerance   • Zestril [Lisinopril] GI Intolerance       History reviewed. No pertinent family history.    Social History     Socioeconomic History   • Marital status:    Tobacco Use   • Smoking status: Never Smoker   • Smokeless tobacco: Never Used   Substance and Sexual Activity   • Alcohol use: Not Currently   • Drug use: Not Currently   • Sexual activity: Defer       Review of Systems   Gastrointestinal: Positive for abdominal pain and diarrhea.   All other systems reviewed and are negative.    Pertinent positives and negatives documented in the HPI and all other systems reviewed and were found to be negative.  There were no vitals filed for this visit.    Physical Exam  Neurological:       Mental Status: She is alert.   Psychiatric:         Mood and Affect: Mood normal.         Behavior: Behavior normal.         Thought Content: Thought content normal.         Diagnoses and all orders for this visit:    1. Severe malnutrition (CMS/HCC) (Primary)    2. Diarrhea of presumed infectious origin    3. GERD without esophagitis    4. History of pancreatitis    5. Occult GI bleeding    6. Periumbilical abdominal pain      Assessment:  1. Abdominal pain  2. Anemia  3. Heme positive stool. No gross evidence of bleeding now according to the patient and her daughter.   4. Recent COVID  5. Recent MRSA PNA ([facility])  6. GERD  7. Diarrhea with loss of stool continence.  8.     Recommendations:  1. Stool studies. These were done 1/8/22 in the hospital so we will not order more stool studies now.   2. Labs: CBC, CMP, amylase, lipase - we decided not to do labs since she just had labs in the hospital and she is getting better.   3. Continue fiber.  4. Be patient, antibiotics have just stopped. The daughter doesn't want invasive procedures done now.   5. F/U with me in the office in 4 weeks. Call Mary (her daughter at [phone]) and schedule Mary to bring her mom in to see me in the office. If the diarrhea persists we could repeat stool studies: clostridium dificile, O and P, etc.    I spent 30 minutes on this Televisit: some time prior to the phone call, during the phone call where I talked to the patient and her daughter, Mary, and after the phone visit.     Return in about 4 weeks (around 2/23/2022).    Feliberto Fernández MD  1/26/2022

## 2022-01-26 NOTE — TELEPHONE ENCOUNTER
I spoke with the patient daughter this morning and notified her of dr carpenter response. I have moved the appointment to 4:15 for a telephone visit with prema conway.

## 2022-01-27 ENCOUNTER — HOME CARE VISIT (OUTPATIENT)
Dept: HOME HEALTH SERVICES | Facility: HOME HEALTHCARE | Age: 87
End: 2022-01-27

## 2022-01-27 VITALS
DIASTOLIC BLOOD PRESSURE: 70 MMHG | OXYGEN SATURATION: 97 % | SYSTOLIC BLOOD PRESSURE: 124 MMHG | TEMPERATURE: 96.8 F | HEART RATE: 75 BPM

## 2022-01-27 PROCEDURE — G0151 HHCP-SERV OF PT,EA 15 MIN: HCPCS

## 2022-01-27 PROCEDURE — G0299 HHS/HOSPICE OF RN EA 15 MIN: HCPCS

## 2022-01-27 NOTE — HOME HEALTH
The patient walked from the bedroom to the chair in the living room.  No c/o soa, resp are even and unlabored. pt does not have a cough, patient's buttocks are pink and blanchable, cream applied, medications reviewed  with patient's , no adverse reaction reported.

## 2022-01-27 NOTE — HOME HEALTH
Subjective: Patient states she didn't sleep well last night because she was up and down several times using the bedside commode.    Assessment: Patient is doing well with her exercises and is improving with ambulatory endurance.    Plan for next visit/Communication  Therapeutic exercises  Gait training  Transfer training

## 2022-01-30 VITALS
HEART RATE: 88 BPM | SYSTOLIC BLOOD PRESSURE: 124 MMHG | RESPIRATION RATE: 18 BRPM | WEIGHT: 85 LBS | DIASTOLIC BLOOD PRESSURE: 68 MMHG | BODY MASS INDEX: 16.6 KG/M2 | TEMPERATURE: 98.2 F | OXYGEN SATURATION: 98 %

## 2022-01-31 ENCOUNTER — TELEPHONE (OUTPATIENT)
Dept: GASTROENTEROLOGY | Facility: CLINIC | Age: 87
End: 2022-01-31

## 2022-01-31 ENCOUNTER — HOME CARE VISIT (OUTPATIENT)
Dept: HOME HEALTH SERVICES | Facility: HOME HEALTHCARE | Age: 87
End: 2022-01-31

## 2022-01-31 PROCEDURE — G0299 HHS/HOSPICE OF RN EA 15 MIN: HCPCS

## 2022-01-31 NOTE — TELEPHONE ENCOUNTER
----- Message from Feliberto Fernández MD sent at 1/26/2022  5:26 PM EST -----  AL - please call the patients daughterMary, @ [phone], and schedule the patient and daughter to f/u with me in the office in 4 weeks. It might be helpful to have the patient's  also in on the visit.      Feliberto Fernández MD Lee, Angela, MA  What about an office visit with me at4:15 pm on Wed, 3/2/22?     Spoke with alek Hernandez on HIPAA regarding 4 week follow up. Scheduled 03/02/2022 at 4:15pm. She will also bring patient's  as requested. Message sent to clinical coordinator to [facility] provider for this slot

## 2022-02-01 ENCOUNTER — HOME CARE VISIT (OUTPATIENT)
Dept: HOME HEALTH SERVICES | Facility: HOME HEALTHCARE | Age: 87
End: 2022-02-01

## 2022-02-01 VITALS
OXYGEN SATURATION: 98 % | TEMPERATURE: 96.4 F | HEART RATE: 77 BPM | SYSTOLIC BLOOD PRESSURE: 122 MMHG | DIASTOLIC BLOOD PRESSURE: 78 MMHG

## 2022-02-01 PROCEDURE — G0151 HHCP-SERV OF PT,EA 15 MIN: HCPCS

## 2022-02-01 NOTE — HOME HEALTH
Subjective: Patient states she is doing better. States she isn't having as many frequent bowel movements.    Assessment: Patient demonstrates independence with transfers. Patient is improving with ambulatory ability and appears to compliant with home program.    Plan for next visit/Communication  Therapeutic exercises  Gait training

## 2022-02-02 ENCOUNTER — HOME CARE VISIT (OUTPATIENT)
Dept: HOME HEALTH SERVICES | Facility: HOME HEALTHCARE | Age: 87
End: 2022-02-02

## 2022-02-02 VITALS
OXYGEN SATURATION: 97 % | DIASTOLIC BLOOD PRESSURE: 62 MMHG | RESPIRATION RATE: 18 BRPM | TEMPERATURE: 98.2 F | WEIGHT: 85 LBS | SYSTOLIC BLOOD PRESSURE: 124 MMHG | BODY MASS INDEX: 16.6 KG/M2 | HEART RATE: 80 BPM

## 2022-02-02 VITALS
DIASTOLIC BLOOD PRESSURE: 70 MMHG | TEMPERATURE: 97.5 F | OXYGEN SATURATION: 97 % | SYSTOLIC BLOOD PRESSURE: 140 MMHG | HEART RATE: 86 BPM

## 2022-02-02 PROCEDURE — G0151 HHCP-SERV OF PT,EA 15 MIN: HCPCS

## 2022-02-02 NOTE — HOME HEALTH
Subjective: Patient states she just had a nap. States she had a little blood in her urine earlier. States her daughter works for a gynecologist and he said not to worry about it right now but to keep an eye on it.    Assessment: Patient has progressed well with all goals met.    Communication: Case communication to Brandy Doe RN clinical manager, Maria Ruvalcaba RN and Marlin Mercado,

## 2022-02-02 NOTE — HOME HEALTH
Patient stating that she has weakness still and not breathing like she would like to get to. Patient is still concerned about STG I PU on coccyx and requests SN obs of healing and precautions.    DIAG: COVID/ PNEUMONIA, SEPSIS, LOW BACK PAIN, ACUTE HYPOXEMIA, RESP FAILURE, HOX OF DM, DVT, ON ELIQUIS. Hx of CHF and HTN.            Plan: CP assess, med compliance, skin assess with STG I to coccyx, T/I deep breathing exercises.

## 2022-02-07 ENCOUNTER — HOME CARE VISIT (OUTPATIENT)
Dept: HOME HEALTH SERVICES | Facility: HOME HEALTHCARE | Age: 87
End: 2022-02-07

## 2022-02-07 PROCEDURE — G0300 HHS/HOSPICE OF LPN EA 15 MIN: HCPCS

## 2022-02-08 VITALS
OXYGEN SATURATION: 98 % | RESPIRATION RATE: 18 BRPM | SYSTOLIC BLOOD PRESSURE: 132 MMHG | DIASTOLIC BLOOD PRESSURE: 72 MMHG | TEMPERATURE: 97.6 F | HEART RATE: 85 BPM

## 2022-02-08 NOTE — HOME HEALTH
next visit:Any changes from appointment with PCP on 2.8.22  Discuss upcoming discharge and have sign MMOC paper

## 2022-02-16 ENCOUNTER — HOME CARE VISIT (OUTPATIENT)
Dept: HOME HEALTH SERVICES | Facility: HOME HEALTHCARE | Age: 87
End: 2022-02-16

## 2022-02-16 PROCEDURE — G0299 HHS/HOSPICE OF RN EA 15 MIN: HCPCS

## 2022-02-18 VITALS
RESPIRATION RATE: 18 BRPM | WEIGHT: 90 LBS | SYSTOLIC BLOOD PRESSURE: 142 MMHG | DIASTOLIC BLOOD PRESSURE: 62 MMHG | TEMPERATURE: 98 F | BODY MASS INDEX: 17.58 KG/M2 | OXYGEN SATURATION: 95 % | HEART RATE: 76 BPM

## 2022-02-21 ENCOUNTER — HOME CARE VISIT (OUTPATIENT)
Dept: HOME HEALTH SERVICES | Facility: HOME HEALTHCARE | Age: 87
End: 2022-02-21

## 2022-02-21 PROCEDURE — G0299 HHS/HOSPICE OF RN EA 15 MIN: HCPCS

## 2022-02-23 VITALS
RESPIRATION RATE: 18 BRPM | TEMPERATURE: 97.6 F | DIASTOLIC BLOOD PRESSURE: 86 MMHG | SYSTOLIC BLOOD PRESSURE: 148 MMHG | HEART RATE: 84 BPM | OXYGEN SATURATION: 98 %

## 2022-03-02 ENCOUNTER — OFFICE VISIT (OUTPATIENT)
Dept: GASTROENTEROLOGY | Facility: CLINIC | Age: 87
End: 2022-03-02

## 2022-03-02 VITALS
BODY MASS INDEX: 16.77 KG/M2 | SYSTOLIC BLOOD PRESSURE: 172 MMHG | OXYGEN SATURATION: 94 % | HEIGHT: 60 IN | HEART RATE: 101 BPM | TEMPERATURE: 96.9 F | DIASTOLIC BLOOD PRESSURE: 91 MMHG | WEIGHT: 85.4 LBS

## 2022-03-02 DIAGNOSIS — K58.2 IRRITABLE BOWEL SYNDROME WITH BOTH CONSTIPATION AND DIARRHEA: ICD-10-CM

## 2022-03-02 DIAGNOSIS — R19.7 DIARRHEA OF PRESUMED INFECTIOUS ORIGIN: Primary | ICD-10-CM

## 2022-03-02 DIAGNOSIS — R10.30 LOWER ABDOMINAL PAIN: ICD-10-CM

## 2022-03-02 DIAGNOSIS — R63.4 WEIGHT LOSS: ICD-10-CM

## 2022-03-02 PROCEDURE — 99214 OFFICE O/P EST MOD 30 MIN: CPT | Performed by: INTERNAL MEDICINE

## 2022-03-02 NOTE — PROGRESS NOTES
No chief complaint on file.      History of Present Illness:   89 y.o. female with a history of hypertension, cervical cancer with treatment by radiotherapy, GERD, osteoarthritis and recent COVID-19 (diagnosed earlier this month) who was admitted 1/8/22 thru 1/13/22 at [facility] and her discharge summary said:        Active Hospital Problems     Diagnosis   POA   • **Pneumonia of right lower lobe due to methicillin resistant Staphylococcus aureus (MRSA) ([facility]) [J15.212]   Yes   • Chronic systolic CHF (congestive heart failure) ([facility]) [I50.22]   Yes   • Severe malnutrition (CMS/HCC) [E43]   Yes   • Occult GI bleeding [R19.5]   Yes   • Hypokalemia [E87.6]   Yes   • Hypomagnesemia [E83.42]   Yes   • Essential hypertension [I10]   Yes   • GERD without esophagitis [K21.9]   Yes   • History of cervical cancer [Z85.41]   Not Applicable   • HLD (hyperlipidemia) [E78.5]                Dr. RUTHIE Corley was the last GI MD in our group to see the patient when she was in [facility] earlier this month on 1/10/22 and he had said:  Assessment:   1.  Abdominal pain, acute on chronic.  Appears to be at baseline  2.  Mild anemia  3.  Heme positive stool  4.  COVID 19 PNA     All problems new to me today     Plan:   Abdominal pain seems to be at baseline.  She has hx of IBS-m followed by Dr Fernández.  Hb minimally depressed, no e/o overt bleeding.  Given age and COVID 19 PNA, no plans for endoscopic evaluation at this time.  She can f/u in office with Dr Fernández upon discharge.           The patient last had an EGD and colonoscopy greater than 10 years ago.       Brain fog is better. She has constipation alternating with diarrhea. She sometimes can't go. No rectal bleeding or melena. The abdominal pain, mostly in the LLQ. She doesn't know what makes the abdominal pain worse or better. She also has vaginal spotting with h/o cervical cancer. She is going to see her Oncologist, Dr. Brian Dougherty. No nausea or vomiting. She may have episodes of bowel  incontinence. She is losing weight.        She just did have a UTI and was recently treated for it. 3 kids, one daughter.         Past Medical History:   Diagnosis Date   • Arthritis    • Cancer (HCC)     cervical CA 2018 finished radiation   • Chronic systolic CHF (congestive heart failure) (HCC) 1/11/2022   • COVID    • Elevated cholesterol    • Enteritis    • GERD (gastroesophageal reflux disease)    • Hypertension    • Pneumonia        Past Surgical History:   Procedure Laterality Date   • CHOLECYSTECTOMY     • COLONOSCOPY     • ENDOSCOPY     • FRACTURE SURGERY      feb 23 2021         Current Outpatient Medications:   •  acetaminophen (TYLENOL) 500 MG tablet, Take 1 tablet by mouth Every 6 (Six) Hours As Needed for Mild Pain ., Disp: 30 tablet, Rfl: 0  •  amLODIPine (NORVASC) 5 MG tablet, Take 5 mg by mouth Daily., Disp: , Rfl:   •  aspirin 81 MG EC tablet, Take 81 mg by mouth Daily., Disp: , Rfl:   •  carvedilol (COREG) 25 MG tablet, Take 25 mg by mouth 2 (Two) Times a Day With Meals., Disp: , Rfl:   •  dilTIAZem (CARDIZEM) 30 MG tablet, Take 1 tablet by mouth Daily. (Patient taking differently: Take 1 tablet by mouth 2 (Two) Times a Day.), Disp: 30 tablet, Rfl: 0  •  DULoxetine (CYMBALTA) 20 MG capsule, Take 30 mg by mouth Daily. Taken at evening , Disp: , Rfl:   •  folic acid (FOLVITE) 1 MG tablet, Take 1 mg by mouth Daily., Disp: , Rfl:   •  hydrocortisone (ANUSOL-HC) 25 MG suppository, Insert 1 suppository into the rectum 2 (Two) Times a Day., Disp: 60 suppository, Rfl: 1  •  latanoprost (XALATAN) 0.005 % ophthalmic solution, 1 drop Every Night., Disp: , Rfl:   •  Melatonin 1 MG/4ML liquid, 5 mg., Disp: , Rfl:   •  multivitamin with minerals (MULTIVITAMIN ADULT PO), Take 1 tablet by mouth Daily., Disp: , Rfl:   •  pantoprazole (PROTONIX) 40 MG EC tablet, Take 40 mg by mouth Daily., Disp: , Rfl:   •  timolol (TIMOPTIC) 0.25 % ophthalmic solution, 1 drop 2 (Two) Times a Day., Disp: , Rfl:   •  traMADol  (ULTRAM) 50 MG tablet, Take 50 mg by mouth Every 8 (Eight) Hours As Needed for Moderate Pain ., Disp: , Rfl:   •  traZODone (DESYREL) 50 MG tablet, Take 50 mg by mouth Every Night., Disp: , Rfl:   •  vitamin B-12 (CYANOCOBALAMIN) 1000 MCG tablet, Take 1,000 mcg by mouth Daily., Disp: , Rfl:   •  albuterol sulfate  (90 Base) MCG/ACT inhaler, Inhale 2 puffs Every 4 (Four) Hours As Needed for Wheezing., Disp: 6.7 g, Rfl: 0  •  atorvastatin (LIPITOR) 40 MG tablet, Take 40 mg by mouth Daily., Disp: , Rfl:   •  budesonide-formoterol (SYMBICORT) 160-4.5 MCG/ACT inhaler, Inhale 2 puffs 2 (Two) Times a Day., Disp: 10.2 g, Rfl: 0  •  carvedilol (COREG) 25 MG tablet, Take 1 tablet by mouth 2 (Two) Times a Day With Meals for 30 days., Disp: 60 tablet, Rfl: 0  •  estradiol (VAGIFEM) 10 MCG tablet vaginal tablet, Insert 1 tablet into the vagina 2 (Two) Times a Week., Disp: , Rfl:   •  ipratropium (ATROVENT HFA) 17 MCG/ACT inhaler, Inhale 2 puffs Every 4 (Four) Hours As Needed for Wheezing., Disp: 12.9 g, Rfl: 0  •  lactobacillus acidophilus (RISAQUAD) capsule capsule, Take 1 capsule by mouth Daily., Disp: 30 capsule, Rfl: 0  •  linezolid (Zyvox) 600 MG tablet, Take 1 tablet by mouth 2 (Two) Times a Day., Disp: 6 tablet, Rfl: 0    Allergies   Allergen Reactions   • Bactrim [Sulfamethoxazole-Trimethoprim] GI Intolerance   • Doxycycline GI Intolerance   • Fosamax [Alendronate] GI Intolerance   • Lactose Intolerance (Gi) GI Intolerance   • Macrodantin [Nitrofurantoin] GI Intolerance   • Naproxen GI Intolerance   • Zestril [Lisinopril] GI Intolerance       History reviewed. No pertinent family history.    Social History     Socioeconomic History   • Marital status:    Tobacco Use   • Smoking status: Never Smoker   • Smokeless tobacco: Never Used   Substance and Sexual Activity   • Alcohol use: Not Currently   • Drug use: Not Currently   • Sexual activity: Defer       Review of Systems   Gastrointestinal: Positive for  abdominal pain, constipation and diarrhea.   All other systems reviewed and are negative.    Pertinent positives and negatives documented in the HPI and all other systems reviewed and were found to be negative.  Vitals:    03/02/22 1655   BP: 172/91   Pulse: 101   Temp: 96.9 °F (36.1 °C)   SpO2: 94%       Physical Exam  Vitals reviewed.   Constitutional:       General: She is not in acute distress.     Appearance: Normal appearance. She is well-developed. She is not diaphoretic.   HENT:      Head: Normocephalic and atraumatic. Hair is normal.      Right Ear: Hearing, tympanic membrane, ear canal and external ear normal. No decreased hearing noted. No drainage.      Left Ear: Hearing, tympanic membrane, ear canal and external ear normal. No decreased hearing noted.      Nose: Nose normal. No nasal deformity.      Mouth/Throat:      Mouth: Mucous membranes are moist.   Eyes:      General: Lids are normal.         Right eye: No discharge.         Left eye: No discharge.      Extraocular Movements: Extraocular movements intact.      Conjunctiva/sclera: Conjunctivae normal.      Pupils: Pupils are equal, round, and reactive to light.   Neck:      Thyroid: No thyromegaly.      Vascular: No JVD.      Trachea: No tracheal deviation.   Cardiovascular:      Rate and Rhythm: Normal rate and regular rhythm.      Pulses: Normal pulses.      Heart sounds: Normal heart sounds. No murmur heard.  No friction rub. No gallop.    Pulmonary:      Effort: Pulmonary effort is normal. No respiratory distress.      Breath sounds: Normal breath sounds. No wheezing or rales.   Chest:      Chest wall: No tenderness.   Abdominal:      General: Bowel sounds are normal. There is no distension.      Palpations: Abdomen is soft. There is no mass.      Tenderness: There is no abdominal tenderness. There is no guarding or rebound.      Hernia: No hernia is present.   Genitourinary:     Rectum: Normal.   Musculoskeletal:         General: No tenderness  or deformity. Normal range of motion.      Cervical back: Normal range of motion and neck supple.   Lymphadenopathy:      Cervical: No cervical adenopathy.   Skin:     General: Skin is warm and dry.      Findings: No erythema or rash.   Neurological:      Mental Status: She is alert and oriented to person, place, and time.      Cranial Nerves: No cranial nerve deficit.      Motor: No abnormal muscle tone.      Coordination: Coordination normal.      Deep Tendon Reflexes: Reflexes are normal and symmetric. Reflexes normal.   Psychiatric:         Mood and Affect: Mood normal.         Behavior: Behavior normal.         Thought Content: Thought content normal.         Judgment: Judgment normal.         Diagnoses and all orders for this visit:    1. Diarrhea of presumed infectious origin (Primary)  -     CBC & Differential  -     Comprehensive Metabolic Panel  -     Lipase  -     Amylase  -     Celiac Ab tTG DGP TIgA  -     TSH    2. Irritable bowel syndrome with both constipation and diarrhea  -     CBC & Differential  -     Comprehensive Metabolic Panel  -     Lipase  -     Amylase  -     Celiac Ab tTG DGP TIgA  -     TSH    3. Weight loss  -     CBC & Differential  -     Comprehensive Metabolic Panel  -     Lipase  -     Amylase  -     Celiac Ab tTG DGP TIgA  -     TSH    4. Lower abdominal pain  -     CBC & Differential  -     Comprehensive Metabolic Panel  -     Lipase  -     Amylase  -     Celiac Ab tTG DGP TIgA  -     TSH      Assessment:  1.  Heme positive stool with anemia.  2.  She was admitted in 1 of 2022 with Covid pneumonia.  3.  Weight loss  4. Stool incontinence.   5.     Recommendations:  1. CBC, CMP, TSH  2. Continue taking fiber daily.  3. She wants to think about c/s.   4. F//u 8 weeks.     No follow-ups on file.    Feliberto Fernández MD  3/2/2022

## 2022-03-05 LAB
ALBUMIN SERPL-MCNC: 4.1 G/DL (ref 3.5–4.6)
ALBUMIN/GLOB SERPL: 1.6 {RATIO} (ref 1.2–2.2)
ALP SERPL-CCNC: 64 IU/L (ref 44–121)
ALT SERPL-CCNC: 9 IU/L (ref 0–32)
AMYLASE SERPL-CCNC: 138 U/L (ref 31–110)
AST SERPL-CCNC: 19 IU/L (ref 0–40)
BASOPHILS # BLD AUTO: 0 X10E3/UL (ref 0–0.2)
BASOPHILS NFR BLD AUTO: 0 %
BILIRUB SERPL-MCNC: 0.2 MG/DL (ref 0–1.2)
BUN SERPL-MCNC: 14 MG/DL (ref 10–36)
BUN/CREAT SERPL: 30 (ref 12–28)
CALCIUM SERPL-MCNC: 9.1 MG/DL (ref 8.7–10.3)
CHLORIDE SERPL-SCNC: 97 MMOL/L (ref 96–106)
CO2 SERPL-SCNC: 27 MMOL/L (ref 20–29)
CREAT SERPL-MCNC: 0.47 MG/DL (ref 0.57–1)
EGFR GENE MUT ANL BLD/T: 90 ML/MIN/1.73
EOSINOPHIL # BLD AUTO: 0.1 X10E3/UL (ref 0–0.4)
EOSINOPHIL NFR BLD AUTO: 1 %
ERYTHROCYTE [DISTWIDTH] IN BLOOD BY AUTOMATED COUNT: 14 % (ref 11.7–15.4)
GLIADIN PEPTIDE IGA SER-ACNC: 5 UNITS (ref 0–19)
GLIADIN PEPTIDE IGG SER-ACNC: 2 UNITS (ref 0–19)
GLOBULIN SER CALC-MCNC: 2.5 G/DL (ref 1.5–4.5)
GLUCOSE SERPL-MCNC: 101 MG/DL (ref 65–99)
HCT VFR BLD AUTO: 36 % (ref 34–46.6)
HGB BLD-MCNC: 11.9 G/DL (ref 11.1–15.9)
IGA SERPL-MCNC: 101 MG/DL (ref 64–422)
IMM GRANULOCYTES # BLD AUTO: 0 X10E3/UL (ref 0–0.1)
IMM GRANULOCYTES NFR BLD AUTO: 0 %
LIPASE SERPL-CCNC: 55 U/L (ref 14–85)
LYMPHOCYTES # BLD AUTO: 0.8 X10E3/UL (ref 0.7–3.1)
LYMPHOCYTES NFR BLD AUTO: 10 %
MCH RBC QN AUTO: 33.3 PG (ref 26.6–33)
MCHC RBC AUTO-ENTMCNC: 33.1 G/DL (ref 31.5–35.7)
MCV RBC AUTO: 101 FL (ref 79–97)
MONOCYTES # BLD AUTO: 0.5 X10E3/UL (ref 0.1–0.9)
MONOCYTES NFR BLD AUTO: 7 %
NEUTROPHILS # BLD AUTO: 5.9 X10E3/UL (ref 1.4–7)
NEUTROPHILS NFR BLD AUTO: 82 %
PLATELET # BLD AUTO: 264 X10E3/UL (ref 150–450)
POTASSIUM SERPL-SCNC: 3.7 MMOL/L (ref 3.5–5.2)
PROT SERPL-MCNC: 6.6 G/DL (ref 6–8.5)
RBC # BLD AUTO: 3.57 X10E6/UL (ref 3.77–5.28)
SODIUM SERPL-SCNC: 139 MMOL/L (ref 134–144)
TSH SERPL DL<=0.005 MIU/L-ACNC: 0.9 UIU/ML (ref 0.45–4.5)
TTG IGA SER-ACNC: <2 U/ML (ref 0–3)
TTG IGG SER-ACNC: <2 U/ML (ref 0–5)
WBC # BLD AUTO: 7.2 X10E3/UL (ref 3.4–10.8)

## 2022-03-06 NOTE — PROGRESS NOTES
03/06/22       Tell her that her labs show that her CBC is normal, which is good. Lipase is normal, amylase is mildly elevated at 138 but in 1/22 your pancreas looked normal on the CT abd/pelvis. Celiac sprue antibody panel looked normal. FAX to her PCP.   Stephen newton

## 2022-03-07 ENCOUNTER — TELEPHONE (OUTPATIENT)
Dept: GASTROENTEROLOGY | Facility: CLINIC | Age: 87
End: 2022-03-07

## 2022-03-07 NOTE — TELEPHONE ENCOUNTER
----- Message from Feliberto Fernández MD sent at 3/6/2022  3:59 PM EST -----  03/06/22       Tell her that her labs show that her CBC is normal, which is good. Lipase is normal, amylase is mildly elevated at 138 but in 1/22 your pancreas looked normal on the CT abd/pelvis. Celiac sprue antibody panel looked normal. FAX to her PCP.   Stephen newton

## 2022-03-07 NOTE — TELEPHONE ENCOUNTER
Call to pt.  Advise per DR Fernández note.  Verb understanding.     Per pt request - VM to daughter, Mary, with request to contact office.     Labs faxed via epic to Dr Hernan Cruz.

## 2022-08-23 ENCOUNTER — OFFICE VISIT (OUTPATIENT)
Dept: GASTROENTEROLOGY | Facility: CLINIC | Age: 87
End: 2022-08-23

## 2022-08-23 VITALS
HEIGHT: 60 IN | SYSTOLIC BLOOD PRESSURE: 134 MMHG | HEART RATE: 76 BPM | WEIGHT: 94 LBS | BODY MASS INDEX: 18.46 KG/M2 | TEMPERATURE: 97.3 F | DIASTOLIC BLOOD PRESSURE: 74 MMHG

## 2022-08-23 DIAGNOSIS — R19.5 HEME POSITIVE STOOL: ICD-10-CM

## 2022-08-23 DIAGNOSIS — R15.9 INCONTINENCE OF FECES WITH FECAL URGENCY: ICD-10-CM

## 2022-08-23 DIAGNOSIS — K58.0 IRRITABLE BOWEL SYNDROME WITH DIARRHEA: Primary | ICD-10-CM

## 2022-08-23 DIAGNOSIS — R15.2 INCONTINENCE OF FECES WITH FECAL URGENCY: ICD-10-CM

## 2022-08-23 DIAGNOSIS — D50.9 IRON DEFICIENCY ANEMIA, UNSPECIFIED IRON DEFICIENCY ANEMIA TYPE: ICD-10-CM

## 2022-08-23 PROCEDURE — 99214 OFFICE O/P EST MOD 30 MIN: CPT | Performed by: NURSE PRACTITIONER

## 2022-08-23 RX ORDER — MONTELUKAST SODIUM 4 MG/1
TABLET, CHEWABLE ORAL
Qty: 60 TABLET | Refills: 11 | Status: SHIPPED | OUTPATIENT
Start: 2022-08-23 | End: 2022-11-11

## 2022-08-23 NOTE — PROGRESS NOTES
Chief Complaint   Patient presents with   • Irritable Bowel Syndrome   • Constipation   • Diarrhea       Bo Adan is a  89 y.o. female here for a follow up visit for IBS.    HPI  89-year-old female presents today with her daughter for follow-up visit for IBS-mixed.  She is a patient of Dr. Fernández.  She was last seen in the office by him on 3/2/2022.  She has history of cholecystectomy.  She has really struggled the last couple years with her IBS.  Going from constipation to diarrhea.  She has had issues regulating it with fiber.  She is taking 1 fiber vitamin a day and eating oatmeal every morning.  Her daughter tells me that if she takes any more fiber than that she has issues.  Lately though she has been having more days of diarrhea than constipation.  She has had so much diarrhea that now she is in the habit of taking Imodium as needed.  The Imodium is stopping the diarrhea but then it throws her into constipation for several days.  And patient does not like to be constipated.  She denies any dysphagia, reflux, nausea and vomiting, rectal bleeding or melena.  She does have history of heme positive stool last time she saw Dr. Fernández in the office.  She does have a history of iron deficiency anemia.  But patient has been adamant she does not want a colonoscopy at this time.  She does have history of cervical cancer.  She has had a colonoscopy and endoscopy in the past.  She does have a history of CHF.  Recently was treated for COVID-pneumonia back in January of this year.  She tells me she was recently on antibiotics for another illness and just finished the antibiotics this past Sunday.  She denies any diarrhea today but she thinks maybe she had diarrhea yesterday.  Her daughter tells me she does not think it is infectious diarrhea.  It does not have an odor.  She feels like it is pretty much the same diarrhea her mother always has.  His recent hemoglobin was reported to be 11.9 at her primary care office last  week.  Patient is not on an iron supplement.  She does have history of mildly elevated amylase at 138.  She does take pantoprazole 40 mg daily for reflux.  She denies any breakthrough reflux at this time.  She denies any significant GI family history at this time.  Past Medical History:   Diagnosis Date   • Arthritis    • Cancer (HCC)     cervical CA 2018 finished radiation   • Chronic systolic CHF (congestive heart failure) (HCC) 1/11/2022   • COVID    • Elevated cholesterol    • Enteritis    • GERD (gastroesophageal reflux disease)    • Hypertension    • Pneumonia        Past Surgical History:   Procedure Laterality Date   • CHOLECYSTECTOMY     • COLONOSCOPY     • ENDOSCOPY     • FRACTURE SURGERY      feb 23 2021       Scheduled Meds:    Continuous Infusions:No current facility-administered medications for this visit.      PRN Meds:.    Allergies   Allergen Reactions   • Bactrim [Sulfamethoxazole-Trimethoprim] GI Intolerance   • Doxycycline GI Intolerance   • Fosamax [Alendronate] GI Intolerance   • Lactose Intolerance (Gi) GI Intolerance   • Macrodantin [Nitrofurantoin] GI Intolerance   • Naproxen GI Intolerance   • Zestril [Lisinopril] GI Intolerance       Social History     Socioeconomic History   • Marital status:    Tobacco Use   • Smoking status: Never Smoker   • Smokeless tobacco: Never Used   Substance and Sexual Activity   • Alcohol use: Not Currently   • Drug use: Not Currently   • Sexual activity: Defer       History reviewed. No pertinent family history.    Review of Systems   Constitutional: Negative for appetite change, chills, diaphoresis, fatigue, fever and unexpected weight change.   HENT: Negative for nosebleeds, postnasal drip, sore throat, trouble swallowing and voice change.    Respiratory: Negative for cough, choking, chest tightness, shortness of breath, wheezing and stridor.    Cardiovascular: Negative for chest pain, palpitations and leg swelling.   Gastrointestinal: Positive for  constipation and diarrhea. Negative for abdominal distention, abdominal pain, anal bleeding, blood in stool, nausea, rectal pain and vomiting.   Endocrine: Negative for polydipsia, polyphagia and polyuria.   Musculoskeletal: Negative for gait problem.   Skin: Negative for rash and wound.   Allergic/Immunologic: Negative for food allergies.   Neurological: Negative for dizziness, speech difficulty and light-headedness.   Psychiatric/Behavioral: Negative for confusion, self-injury, sleep disturbance and suicidal ideas.       Vitals:    08/23/22 1100   BP: 134/74   Pulse: 76   Temp: 97.3 °F (36.3 °C)       Physical Exam  Constitutional:       General: She is not in acute distress.     Appearance: She is well-developed. She is not ill-appearing.   HENT:      Head: Normocephalic.   Eyes:      Pupils: Pupils are equal, round, and reactive to light.   Cardiovascular:      Rate and Rhythm: Normal rate and regular rhythm.      Heart sounds: Normal heart sounds.   Pulmonary:      Effort: Pulmonary effort is normal.      Breath sounds: Normal breath sounds.   Abdominal:      General: Bowel sounds are normal. There is no distension.      Palpations: Abdomen is soft. There is no mass.      Tenderness: There is no abdominal tenderness. There is no guarding or rebound.      Hernia: No hernia is present.   Musculoskeletal:         General: Normal range of motion.   Skin:     General: Skin is warm and dry.   Neurological:      Mental Status: She is alert and oriented to person, place, and time.   Psychiatric:         Speech: Speech normal.         Behavior: Behavior normal.         Judgment: Judgment normal.         No radiology results for the last 7 days     Diagnoses and all orders for this visit:    1. Irritable bowel syndrome with diarrhea (Primary)    2. Incontinence of feces with fecal urgency    3. Heme positive stool    4. Iron deficiency anemia, unspecified iron deficiency anemia type    Other orders  -     colestipol  (COLESTID) 1 g tablet; Start with one pill daily and increase to 2 daily if no improvement after 2 weeks  Dispense: 60 tablet; Refill: 11    Reviewed most recent lab results with her today.  Hemoglobin stable at 11.9.  Sounds like lately she is having more diarrhea than constipation with her IBS.  Given her history of cholecystectomy suspect possibly bowel acid diarrhea.  We will start her on colestipol daily and see how she does.  Continue oatmeal and daily fiber supplement.  Would like to try to eliminate the Imodium when possible.  Since it does seem to throw her into constipation very easily.  Patient to continue high-fiber diet.  Continue pantoprazole 40 mg daily.  Continue GERD precautions.  We did rediscuss the possibility of doing a colonoscopy.  Patient is adamant she does not want to pursue a colonoscopy right now.  May be in the future.  Patient to call the office next week with an update.  Patient to follow-up with me in 1 month.  Patient is agreeable to the plan.

## 2022-08-29 ENCOUNTER — TELEPHONE (OUTPATIENT)
Dept: GASTROENTEROLOGY | Facility: CLINIC | Age: 87
End: 2022-08-29

## 2022-08-29 NOTE — TELEPHONE ENCOUNTER
Caller: MARLA CALLES    Relationship to patient: Emergency Contact    Best call back number: [phone]     Patient is needing:  PT WANTED TO GIVE UPDATE ON MEDICATION AND SPEAK WITH NP OR NURSE .

## 2022-08-29 NOTE — TELEPHONE ENCOUNTER
Call to Mary.  Reports started colestipol 8/24 as instructed with o/v of 8/23.  No BM until 8/28 - and that only after taking laxative.  Then had copious diarrhea, so took imodium.  Also takes fiber once/day.      Mary states has had cervical ca in the past and was tx with internal and external radiation  Wondering if some kind of blockage.  Wondering if c/s may perhaps be indicated.  Or how to better manage bowel pattern.     Update/questions to LEILANI Roman.

## 2022-08-29 NOTE — TELEPHONE ENCOUNTER
If she was taking the colestipol for diarrhea and then it caused constipation would recommend taking colestipol every other day instead of daily.  See what that does for 1 week.  Have patient call back with update at that time.

## 2022-08-30 NOTE — TELEPHONE ENCOUNTER
Caller: MARLA CALLES    Relationship to patient: Emergency Contact    Best call back number: [phone]    Patient is needing: PTS DAUGHTER CALLED BACK SAYING SHE HAD A VM LEFT

## 2022-08-30 NOTE — TELEPHONE ENCOUNTER
Called pt's daughter and advised of Sadie TREVIZO's note. She reports that her mother probably will not take the cholestipol again because she is convinced that this is the problem. She states she will try to talk with her mother and will call us with an update later.     Update sent to Sadie TREVIZO.

## 2022-11-11 ENCOUNTER — OFFICE VISIT (OUTPATIENT)
Dept: GASTROENTEROLOGY | Facility: CLINIC | Age: 87
End: 2022-11-11

## 2022-11-11 VITALS
SYSTOLIC BLOOD PRESSURE: 135 MMHG | HEIGHT: 60 IN | HEART RATE: 77 BPM | BODY MASS INDEX: 18.89 KG/M2 | WEIGHT: 96.2 LBS | TEMPERATURE: 97.2 F | OXYGEN SATURATION: 96 % | DIASTOLIC BLOOD PRESSURE: 82 MMHG

## 2022-11-11 DIAGNOSIS — K92.1 BLACK STOOLS: Primary | ICD-10-CM

## 2022-11-11 DIAGNOSIS — R10.13 EPIGASTRIC PAIN: ICD-10-CM

## 2022-11-11 DIAGNOSIS — R09.89 GLOBUS SENSATION: ICD-10-CM

## 2022-11-11 DIAGNOSIS — K58.2 IRRITABLE BOWEL SYNDROME WITH BOTH CONSTIPATION AND DIARRHEA: ICD-10-CM

## 2022-11-11 DIAGNOSIS — K21.9 GASTROESOPHAGEAL REFLUX DISEASE, UNSPECIFIED WHETHER ESOPHAGITIS PRESENT: ICD-10-CM

## 2022-11-11 LAB
BASOPHILS # BLD AUTO: 0.02 10*3/MM3 (ref 0–0.2)
BASOPHILS NFR BLD AUTO: 0.2 % (ref 0–1.5)
EOSINOPHIL # BLD AUTO: 0.06 10*3/MM3 (ref 0–0.4)
EOSINOPHIL NFR BLD AUTO: 0.6 % (ref 0.3–6.2)
ERYTHROCYTE [DISTWIDTH] IN BLOOD BY AUTOMATED COUNT: 13.1 % (ref 12.3–15.4)
HCT VFR BLD AUTO: 37.5 % (ref 34–46.6)
HGB BLD-MCNC: 12.3 G/DL (ref 12–15.9)
IMM GRANULOCYTES # BLD AUTO: 0.12 10*3/MM3 (ref 0–0.05)
IMM GRANULOCYTES NFR BLD AUTO: 1.2 % (ref 0–0.5)
LYMPHOCYTES # BLD AUTO: 0.72 10*3/MM3 (ref 0.7–3.1)
LYMPHOCYTES NFR BLD AUTO: 7 % (ref 19.6–45.3)
MCH RBC QN AUTO: 32.6 PG (ref 26.6–33)
MCHC RBC AUTO-ENTMCNC: 32.8 G/DL (ref 31.5–35.7)
MCV RBC AUTO: 99.5 FL (ref 79–97)
MONOCYTES # BLD AUTO: 0.93 10*3/MM3 (ref 0.1–0.9)
MONOCYTES NFR BLD AUTO: 9 % (ref 5–12)
NEUTROPHILS # BLD AUTO: 8.48 10*3/MM3 (ref 1.7–7)
NEUTROPHILS NFR BLD AUTO: 82 % (ref 42.7–76)
NRBC BLD AUTO-RTO: 0 /100 WBC (ref 0–0.2)
PLATELET # BLD AUTO: 282 10*3/MM3 (ref 140–450)
RBC # BLD AUTO: 3.77 10*6/MM3 (ref 3.77–5.28)
WBC # BLD AUTO: 10.33 10*3/MM3 (ref 3.4–10.8)

## 2022-11-11 PROCEDURE — 99214 OFFICE O/P EST MOD 30 MIN: CPT | Performed by: NURSE PRACTITIONER

## 2022-11-11 RX ORDER — ONDANSETRON 4 MG/1
TABLET, ORALLY DISINTEGRATING ORAL
COMMUNITY
Start: 2022-08-22

## 2022-11-11 NOTE — PROGRESS NOTES
Chief Complaint   Patient presents with   • Irritable Bowel Syndrome       Bo Adan is a  89 y.o. female here for a follow up visit for IBS.    HPI  89-year-old female presents today accompanied by her daughter for follow-up visit for IBS.  She is a patient of Dr. Fernández.  She was last seen in the office by me on 8/23/2022.  She has a history of IBS-mixed and unfortunately reports that the colestipol caused too much constipation like the Imodium so she quit taking it.  Her daughter feels like that most of her problems really is due to her taking the Imodium too much.  The patient admits that she does not have watery diarrhea she does has really soft stools.  Lately she has been complaining about black and tarry stools that is hard to clean.  She admits she has been taking a lot of Pepto-Bismol lately instead of the Imodium because she feels like that helps with the diarrhea but does not cause the constipation.  She does have a history of GERD and admits lately her reflux has been worse.  She been having more epigastric pain and burning.  She thought maybe initially it was a sore throats her primary care gave her a bunch of medicine for an upper respiratory infection but of course none of that helped.  She is on Protonix 40 mg daily.  She was wondering if she could add some Pepcid to it?  She does admit she has been on Protonix for a really long time.  She denies any trouble swallowing but she does feel like sometimes something can be stuck.  Nothing has thankfully gotten stuck.  She has history of cholecystectomy.  She has had an EGD and colonoscopy in the past.  Its been at least 10 years ago.  She tells me she is still adamant she does not want another EGD or colonoscopy.  She tells me even if we were to find something that would require treatment like cancer she does not want treatment at this time.  She does have a history of cervical cancer and has had radiation in 2018 for it.  She has had a Hemoccult  positive stool in the past with Dr. eFrnández.  She does have a history of iron deficiency anemia.  Most recent hemoglobin was stable at 11.9 on 3/4/2022.  Her daughter reports even fiber tends to bulk the patient up too much and cause constipation.  She is tried stool softeners and MiraLAX.  She feels like the best help was with MiraLAX but the patient did not feel like taking it every day and was worried that it would cause too much diarrhea.  She had a CT scan of the abdomen and pelvis this past January which was negative for any acute abdominal process.  She denies any abdominal pain, nausea and vomiting, or rectal bleeding.  She admits her appetite is okay and her weight is up 10 pounds since January of this year.  She does have a history of CHF.  She was recently treated for both COVID and pneumonia earlier this year.  Patient still complains of rectal pain and spasms.  Her daughter admits that when the patient applies the steroid cream rectally she gets immediate relief.  The daughter is wondering if maybe it is more relief just from the relaxation of that finger being introduced into the rectum versus the cream.  This time the patient does not feel like it is any worse than usual.  Past Medical History:   Diagnosis Date   • Arthritis    • Cancer (HCC)     cervical CA 2018 finished radiation   • Chronic systolic CHF (congestive heart failure) (HCC) 01/11/2022   • COVID 12/2021   • Elevated cholesterol    • Enteritis    • GERD (gastroesophageal reflux disease)    • Hypertension    • Pneumonia 12/2021       Past Surgical History:   Procedure Laterality Date   • CHOLECYSTECTOMY     • COLONOSCOPY     • ENDOSCOPY     • FRACTURE SURGERY      feb 23 2021       Scheduled Meds:    Continuous Infusions:No current facility-administered medications for this visit.      PRN Meds:.    Allergies   Allergen Reactions   • Bactrim [Sulfamethoxazole-Trimethoprim] GI Intolerance   • Doxycycline GI Intolerance   • Fosamax  [Alendronate] GI Intolerance   • Lactose Intolerance (Gi) GI Intolerance   • Macrodantin [Nitrofurantoin] GI Intolerance   • Naproxen GI Intolerance   • Zestril [Lisinopril] GI Intolerance       Social History     Socioeconomic History   • Marital status:    Tobacco Use   • Smoking status: Never   • Smokeless tobacco: Never   Substance and Sexual Activity   • Alcohol use: Not Currently   • Drug use: Not Currently   • Sexual activity: Defer       No family history on file.    Review of Systems   Constitutional: Positive for fatigue. Negative for appetite change, chills, diaphoresis, fever and unexpected weight change.   HENT: Negative for nosebleeds, postnasal drip, sore throat, trouble swallowing and voice change.    Respiratory: Negative for cough, choking, chest tightness, shortness of breath, wheezing and stridor.    Cardiovascular: Negative for chest pain, palpitations and leg swelling.   Gastrointestinal: Positive for abdominal pain, constipation, diarrhea and rectal pain. Negative for abdominal distention, anal bleeding, blood in stool, nausea and vomiting.   Endocrine: Negative for polydipsia, polyphagia and polyuria.   Musculoskeletal: Negative for gait problem.   Skin: Negative for rash and wound.   Allergic/Immunologic: Negative for food allergies.   Neurological: Negative for dizziness, speech difficulty and light-headedness.   Psychiatric/Behavioral: Negative for confusion, self-injury, sleep disturbance and suicidal ideas.       Vitals:    11/11/22 1013   BP: 135/82   Pulse: 77   Temp: 97.2 °F (36.2 °C)   SpO2: 96%       Physical Exam  Constitutional:       General: She is not in acute distress.     Appearance: She is well-developed. She is not ill-appearing.   HENT:      Head: Normocephalic.   Eyes:      Pupils: Pupils are equal, round, and reactive to light.   Cardiovascular:      Rate and Rhythm: Normal rate and regular rhythm.      Heart sounds: Normal heart sounds.   Pulmonary:      Effort:  Pulmonary effort is normal.      Breath sounds: Normal breath sounds.   Abdominal:      General: Bowel sounds are normal. There is no distension.      Palpations: Abdomen is soft. There is no mass.      Tenderness: There is no abdominal tenderness. There is no guarding or rebound.      Hernia: No hernia is present.   Musculoskeletal:         General: Normal range of motion.   Skin:     General: Skin is warm and dry.   Neurological:      Mental Status: She is alert and oriented to person, place, and time.   Psychiatric:         Speech: Speech normal.         Behavior: Behavior normal.         Judgment: Judgment normal.         No radiology results for the last 7 days     Diagnoses and all orders for this visit:    1. Black stools (Primary)  -     CBC & Differential    2. Gastroesophageal reflux disease, unspecified whether esophagitis present  -     CBC & Differential    3. Epigastric pain  -     CBC & Differential    4. Globus sensation    5. Irritable bowel syndrome with both constipation and diarrhea       It does sound like the black stools are most likely secondary to Pepto-Bismol.  However given her history we will check some Hemoccult stool cards x3.  We will check a CBC today.  Patient is to stop the Pepto-Bismol.  We did have a long conversation about avoiding Imodium when possible.  It does sound like the Imodium is her trigger for her constipation cycle.  GERD is not well controlled currently.  Continue Protonix 40 mg daily but add Pepcid 20 mg twice daily to it.  Continue GERD precautions.  We did discuss again the possibility of doing an EGD or colonoscopy based on her findings.  The patient is again adamant that she does not want any procedures done.  She tells me it does not matter what we were to find because she does not want any potential treatment for it.  Patient's daughter to call the office Monday with an update.  Patient to follow-up with me in 2 to 3 weeks.  Patient is agreeable to the plan.

## 2022-11-14 ENCOUNTER — TELEPHONE (OUTPATIENT)
Dept: GASTROENTEROLOGY | Facility: CLINIC | Age: 87
End: 2022-11-14

## 2022-11-14 NOTE — TELEPHONE ENCOUNTER
----- Message from NATIVIDAD Taylor sent at 11/14/2022  1:11 PM EST -----  Please call the patient and let her know her hemoglobin is stable at 12.3.  White count is in normal range.  Thanks

## 2023-08-25 ENCOUNTER — OFFICE (OUTPATIENT)
Dept: URBAN - METROPOLITAN AREA CLINIC 76 | Facility: CLINIC | Age: 88
End: 2023-08-25

## 2023-08-25 VITALS
OXYGEN SATURATION: 96 % | WEIGHT: 107 LBS | HEART RATE: 89 BPM | HEIGHT: 61 IN | DIASTOLIC BLOOD PRESSURE: 69 MMHG | SYSTOLIC BLOOD PRESSURE: 115 MMHG

## 2023-08-25 DIAGNOSIS — R10.9 UNSPECIFIED ABDOMINAL PAIN: ICD-10-CM

## 2023-08-25 DIAGNOSIS — K62.7 RADIATION PROCTITIS: ICD-10-CM

## 2023-08-25 DIAGNOSIS — K59.09 OTHER CONSTIPATION: ICD-10-CM

## 2023-08-25 PROCEDURE — 99204 OFFICE O/P NEW MOD 45 MIN: CPT | Performed by: INTERNAL MEDICINE

## 2023-08-25 RX ORDER — LINACLOTIDE 145 UG/1
CAPSULE, GELATIN COATED ORAL
Qty: 90 | Refills: 1 | Status: COMPLETED
Start: 2023-08-25 | End: 2023-09-15

## 2023-09-15 ENCOUNTER — OFFICE (OUTPATIENT)
Dept: URBAN - METROPOLITAN AREA CLINIC 76 | Facility: CLINIC | Age: 88
End: 2023-09-15

## 2023-09-15 VITALS
HEIGHT: 61 IN | WEIGHT: 109 LBS | DIASTOLIC BLOOD PRESSURE: 70 MMHG | SYSTOLIC BLOOD PRESSURE: 122 MMHG | OXYGEN SATURATION: 98 % | HEART RATE: 63 BPM

## 2023-09-15 DIAGNOSIS — K59.00 CONSTIPATION, UNSPECIFIED: ICD-10-CM

## 2023-09-15 DIAGNOSIS — K62.89 OTHER SPECIFIED DISEASES OF ANUS AND RECTUM: ICD-10-CM

## 2023-09-15 PROCEDURE — 99213 OFFICE O/P EST LOW 20 MIN: CPT | Performed by: INTERNAL MEDICINE

## 2023-09-15 RX ORDER — LIDOCAINE 5 G/100G
OINTMENT TOPICAL
Qty: 1 | Refills: 3 | Status: COMPLETED
Start: 2023-09-15 | End: 2024-07-31

## 2024-07-31 ENCOUNTER — OFFICE (OUTPATIENT)
Dept: URBAN - METROPOLITAN AREA CLINIC 76 | Facility: CLINIC | Age: 89
End: 2024-07-31
Payer: COMMERCIAL

## 2024-07-31 VITALS
OXYGEN SATURATION: 98 % | DIASTOLIC BLOOD PRESSURE: 74 MMHG | HEIGHT: 61 IN | SYSTOLIC BLOOD PRESSURE: 112 MMHG | HEART RATE: 87 BPM | WEIGHT: 105 LBS

## 2024-07-31 DIAGNOSIS — R19.7 DIARRHEA, UNSPECIFIED: ICD-10-CM

## 2024-07-31 DIAGNOSIS — K62.7 RADIATION PROCTITIS: ICD-10-CM

## 2024-07-31 DIAGNOSIS — R63.4 ABNORMAL WEIGHT LOSS: ICD-10-CM

## 2024-07-31 DIAGNOSIS — K80.31 CALCULUS OF BILE DUCT WITH CHOLANGITIS, UNSPECIFIED, WITH OB: ICD-10-CM

## 2024-07-31 DIAGNOSIS — Z85.41 PERSONAL HISTORY OF MALIGNANT NEOPLASM OF CERVIX UTERI: ICD-10-CM

## 2024-07-31 DIAGNOSIS — E87.1 HYPO-OSMOLALITY AND HYPONATREMIA: ICD-10-CM

## 2024-07-31 DIAGNOSIS — K59.04 CHRONIC IDIOPATHIC CONSTIPATION: ICD-10-CM

## 2024-07-31 DIAGNOSIS — R10.9 UNSPECIFIED ABDOMINAL PAIN: ICD-10-CM

## 2024-07-31 DIAGNOSIS — K62.89 OTHER SPECIFIED DISEASES OF ANUS AND RECTUM: ICD-10-CM

## 2024-07-31 DIAGNOSIS — R19.5 OTHER FECAL ABNORMALITIES: ICD-10-CM

## 2024-07-31 PROCEDURE — 99214 OFFICE O/P EST MOD 30 MIN: CPT

## 2024-07-31 RX ORDER — LINACLOTIDE 145 UG/1
145 CAPSULE, GELATIN COATED ORAL
Qty: 90 | Refills: 3 | Status: ACTIVE

## 2024-10-29 ENCOUNTER — OFFICE (OUTPATIENT)
Age: 89
End: 2024-10-29
Payer: COMMERCIAL

## 2024-10-29 ENCOUNTER — OFFICE (OUTPATIENT)
Dept: URBAN - METROPOLITAN AREA CLINIC 76 | Facility: CLINIC | Age: 89
End: 2024-10-29
Payer: COMMERCIAL

## 2024-10-29 VITALS
HEIGHT: 61 IN | HEART RATE: 84 BPM | SYSTOLIC BLOOD PRESSURE: 118 MMHG | WEIGHT: 102 LBS | OXYGEN SATURATION: 96 % | WEIGHT: 102 LBS | DIASTOLIC BLOOD PRESSURE: 74 MMHG | OXYGEN SATURATION: 96 % | SYSTOLIC BLOOD PRESSURE: 118 MMHG | DIASTOLIC BLOOD PRESSURE: 74 MMHG | HEART RATE: 84 BPM | HEIGHT: 61 IN

## 2024-10-29 DIAGNOSIS — K59.04 CHRONIC IDIOPATHIC CONSTIPATION: ICD-10-CM

## 2024-10-29 DIAGNOSIS — K62.7 RADIATION PROCTITIS: ICD-10-CM

## 2024-10-29 DIAGNOSIS — K62.89 OTHER SPECIFIED DISEASES OF ANUS AND RECTUM: ICD-10-CM

## 2024-10-29 DIAGNOSIS — Z85.41 PERSONAL HISTORY OF MALIGNANT NEOPLASM OF CERVIX UTERI: ICD-10-CM

## 2024-10-29 PROCEDURE — 99214 OFFICE O/P EST MOD 30 MIN: CPT | Performed by: NURSE PRACTITIONER

## 2025-07-06 ENCOUNTER — APPOINTMENT (OUTPATIENT)
Dept: CT IMAGING | Facility: HOSPITAL | Age: OVER 89
End: 2025-07-06
Payer: MEDICARE

## 2025-07-06 ENCOUNTER — HOSPITAL ENCOUNTER (OUTPATIENT)
Facility: HOSPITAL | Age: OVER 89
Setting detail: OBSERVATION
Discharge: HOME | End: 2025-07-08
Attending: EMERGENCY MEDICINE | Admitting: STUDENT IN AN ORGANIZED HEALTH CARE EDUCATION/TRAINING PROGRAM
Payer: MEDICARE

## 2025-07-06 DIAGNOSIS — R19.7 DIARRHEA, UNSPECIFIED TYPE: ICD-10-CM

## 2025-07-06 DIAGNOSIS — K80.50 CHOLEDOCHOLITHIASIS: ICD-10-CM

## 2025-07-06 DIAGNOSIS — I47.10 PAROXYSMAL SVT (SUPRAVENTRICULAR TACHYCARDIA): Primary | ICD-10-CM

## 2025-07-06 LAB
ALBUMIN SERPL-MCNC: 3.8 G/DL (ref 3.5–5.2)
ALBUMIN/GLOB SERPL: 1.1 G/DL
ALP SERPL-CCNC: 89 U/L (ref 39–117)
ALT SERPL W P-5'-P-CCNC: 9 U/L (ref 1–33)
ANION GAP SERPL CALCULATED.3IONS-SCNC: 8.6 MMOL/L (ref 5–15)
ANISOCYTOSIS BLD QL: ABNORMAL
AST SERPL-CCNC: 23 U/L (ref 1–32)
BASOPHILS # BLD MANUAL: 0 10*3/MM3 (ref 0–0.2)
BASOPHILS NFR BLD MANUAL: 0 % (ref 0–1.5)
BILIRUB SERPL-MCNC: 0.3 MG/DL (ref 0–1.2)
BILIRUB UR QL STRIP: NEGATIVE
BUN SERPL-MCNC: 9 MG/DL (ref 8–23)
BUN/CREAT SERPL: 15.8 (ref 7–25)
CALCIUM SPEC-SCNC: 9.6 MG/DL (ref 8.2–9.6)
CHLORIDE SERPL-SCNC: 99 MMOL/L (ref 98–107)
CLARITY UR: CLEAR
CO2 SERPL-SCNC: 25.4 MMOL/L (ref 22–29)
COLOR UR: YELLOW
CREAT SERPL-MCNC: 0.57 MG/DL (ref 0.57–1)
DEPRECATED RDW RBC AUTO: 51.7 FL (ref 37–54)
EGFRCR SERPLBLD CKD-EPI 2021: 84.3 ML/MIN/1.73
EOSINOPHIL # BLD MANUAL: 0 10*3/MM3 (ref 0–0.4)
EOSINOPHIL NFR BLD MANUAL: 0 % (ref 0.3–6.2)
ERYTHROCYTE [DISTWIDTH] IN BLOOD BY AUTOMATED COUNT: 13 % (ref 12.3–15.4)
GLOBULIN UR ELPH-MCNC: 3.5 GM/DL
GLUCOSE SERPL-MCNC: 100 MG/DL (ref 65–99)
GLUCOSE UR STRIP-MCNC: NEGATIVE MG/DL
HCT VFR BLD AUTO: 38.2 % (ref 34–46.6)
HGB BLD-MCNC: 11.9 G/DL (ref 12–15.9)
HGB UR QL STRIP.AUTO: NEGATIVE
KETONES UR QL STRIP: NEGATIVE
LEUKOCYTE ESTERASE UR QL STRIP.AUTO: NEGATIVE
LYMPHOCYTES # BLD MANUAL: 0.59 10*3/MM3 (ref 0.7–3.1)
LYMPHOCYTES NFR BLD MANUAL: 5.1 % (ref 5–12)
MACROCYTES BLD QL SMEAR: ABNORMAL
MAGNESIUM SERPL-MCNC: 1.6 MG/DL (ref 1.7–2.3)
MCH RBC QN AUTO: 33.5 PG (ref 26.6–33)
MCHC RBC AUTO-ENTMCNC: 31.2 G/DL (ref 31.5–35.7)
MCV RBC AUTO: 107.6 FL (ref 79–97)
MONOCYTES # BLD: 0.3 10*3/MM3 (ref 0.1–0.9)
NEUTROPHILS # BLD AUTO: 4.9 10*3/MM3 (ref 1.7–7)
NEUTROPHILS NFR BLD MANUAL: 84.7 % (ref 42.7–76)
NITRITE UR QL STRIP: NEGATIVE
PH UR STRIP.AUTO: 7.5 [PH] (ref 5–8)
PLAT MORPH BLD: NORMAL
PLATELET # BLD AUTO: 231 10*3/MM3 (ref 140–450)
PMV BLD AUTO: 9.5 FL (ref 6–12)
POTASSIUM SERPL-SCNC: 4.2 MMOL/L (ref 3.5–5.2)
PROT SERPL-MCNC: 7.3 G/DL (ref 6–8.5)
PROT UR QL STRIP: ABNORMAL
QT INTERVAL: 316 MS
QTC INTERVAL: 488 MS
RBC # BLD AUTO: 3.55 10*6/MM3 (ref 3.77–5.28)
SODIUM SERPL-SCNC: 133 MMOL/L (ref 136–145)
SP GR UR STRIP: <=1.005 (ref 1–1.03)
TSH SERPL DL<=0.05 MIU/L-ACNC: 1.31 UIU/ML (ref 0.27–4.2)
UROBILINOGEN UR QL STRIP: ABNORMAL
VARIANT LYMPHS NFR BLD MANUAL: 2 % (ref 0–5)
VARIANT LYMPHS NFR BLD MANUAL: 8.2 % (ref 19.6–45.3)
WBC MORPH BLD: NORMAL
WBC NRBC COR # BLD AUTO: 5.79 10*3/MM3 (ref 3.4–10.8)

## 2025-07-06 PROCEDURE — 99285 EMERGENCY DEPT VISIT HI MDM: CPT

## 2025-07-06 PROCEDURE — P9612 CATHETERIZE FOR URINE SPEC: HCPCS

## 2025-07-06 PROCEDURE — 85007 BL SMEAR W/DIFF WBC COUNT: CPT | Performed by: EMERGENCY MEDICINE

## 2025-07-06 PROCEDURE — G0378 HOSPITAL OBSERVATION PER HR: HCPCS

## 2025-07-06 PROCEDURE — 85025 COMPLETE CBC W/AUTO DIFF WBC: CPT | Performed by: EMERGENCY MEDICINE

## 2025-07-06 PROCEDURE — 25810000003 LACTATED RINGERS SOLUTION: Performed by: EMERGENCY MEDICINE

## 2025-07-06 PROCEDURE — 81003 URINALYSIS AUTO W/O SCOPE: CPT | Performed by: EMERGENCY MEDICINE

## 2025-07-06 PROCEDURE — 93005 ELECTROCARDIOGRAM TRACING: CPT | Performed by: EMERGENCY MEDICINE

## 2025-07-06 PROCEDURE — 93010 ELECTROCARDIOGRAM REPORT: CPT | Performed by: INTERNAL MEDICINE

## 2025-07-06 PROCEDURE — 83735 ASSAY OF MAGNESIUM: CPT | Performed by: HOSPITALIST

## 2025-07-06 PROCEDURE — 80053 COMPREHEN METABOLIC PANEL: CPT | Performed by: EMERGENCY MEDICINE

## 2025-07-06 PROCEDURE — 74176 CT ABD & PELVIS W/O CONTRAST: CPT

## 2025-07-06 PROCEDURE — 84443 ASSAY THYROID STIM HORMONE: CPT | Performed by: HOSPITALIST

## 2025-07-06 PROCEDURE — 25810000003 SODIUM CHLORIDE 0.9 % SOLUTION: Performed by: HOSPITALIST

## 2025-07-06 RX ORDER — MULTIVITAMIN WITH IRON
1000 TABLET ORAL DAILY
Status: DISCONTINUED | OUTPATIENT
Start: 2025-07-06 | End: 2025-07-08 | Stop reason: HOSPADM

## 2025-07-06 RX ORDER — ONDANSETRON 2 MG/ML
4 INJECTION INTRAMUSCULAR; INTRAVENOUS EVERY 6 HOURS PRN
Status: DISCONTINUED | OUTPATIENT
Start: 2025-07-06 | End: 2025-07-08 | Stop reason: HOSPADM

## 2025-07-06 RX ORDER — TRAMADOL HYDROCHLORIDE 50 MG/1
50 TABLET ORAL EVERY 8 HOURS PRN
Status: DISCONTINUED | OUTPATIENT
Start: 2025-07-06 | End: 2025-07-08 | Stop reason: HOSPADM

## 2025-07-06 RX ORDER — ATORVASTATIN CALCIUM 20 MG/1
40 TABLET, FILM COATED ORAL DAILY
Status: DISCONTINUED | OUTPATIENT
Start: 2025-07-06 | End: 2025-07-08 | Stop reason: HOSPADM

## 2025-07-06 RX ORDER — SODIUM CHLORIDE 0.9 % (FLUSH) 0.9 %
10 SYRINGE (ML) INJECTION AS NEEDED
Status: DISCONTINUED | OUTPATIENT
Start: 2025-07-06 | End: 2025-07-08 | Stop reason: HOSPADM

## 2025-07-06 RX ORDER — PANTOPRAZOLE SODIUM 40 MG/1
40 TABLET, DELAYED RELEASE ORAL DAILY
Status: DISCONTINUED | OUTPATIENT
Start: 2025-07-06 | End: 2025-07-08 | Stop reason: HOSPADM

## 2025-07-06 RX ORDER — ACETAMINOPHEN 650 MG/1
650 SUPPOSITORY RECTAL EVERY 4 HOURS PRN
Status: DISCONTINUED | OUTPATIENT
Start: 2025-07-06 | End: 2025-07-08 | Stop reason: HOSPADM

## 2025-07-06 RX ORDER — DULOXETIN HYDROCHLORIDE 30 MG/1
30 CAPSULE, DELAYED RELEASE ORAL DAILY
Status: DISCONTINUED | OUTPATIENT
Start: 2025-07-06 | End: 2025-07-08 | Stop reason: HOSPADM

## 2025-07-06 RX ORDER — TIMOLOL MALEATE 2.5 MG/ML
1 SOLUTION/ DROPS OPHTHALMIC EVERY 12 HOURS SCHEDULED
Status: DISCONTINUED | OUTPATIENT
Start: 2025-07-06 | End: 2025-07-08 | Stop reason: HOSPADM

## 2025-07-06 RX ORDER — ACETAMINOPHEN 325 MG/1
650 TABLET ORAL EVERY 4 HOURS PRN
Status: DISCONTINUED | OUTPATIENT
Start: 2025-07-06 | End: 2025-07-08 | Stop reason: HOSPADM

## 2025-07-06 RX ORDER — FOLIC ACID 1 MG/1
1 TABLET ORAL DAILY
Status: DISCONTINUED | OUTPATIENT
Start: 2025-07-06 | End: 2025-07-08 | Stop reason: HOSPADM

## 2025-07-06 RX ORDER — ALBUTEROL SULFATE 0.63 MG/3ML
0.63 SOLUTION RESPIRATORY (INHALATION) EVERY 6 HOURS PRN
Status: DISCONTINUED | OUTPATIENT
Start: 2025-07-06 | End: 2025-07-06

## 2025-07-06 RX ORDER — SODIUM CHLORIDE 9 MG/ML
75 INJECTION, SOLUTION INTRAVENOUS CONTINUOUS
Status: ACTIVE | OUTPATIENT
Start: 2025-07-06 | End: 2025-07-07

## 2025-07-06 RX ORDER — BUDESONIDE AND FORMOTEROL FUMARATE DIHYDRATE 160; 4.5 UG/1; UG/1
2 AEROSOL RESPIRATORY (INHALATION)
Status: DISCONTINUED | OUTPATIENT
Start: 2025-07-06 | End: 2025-07-08 | Stop reason: HOSPADM

## 2025-07-06 RX ORDER — SODIUM CHLORIDE 9 MG/ML
40 INJECTION, SOLUTION INTRAVENOUS AS NEEDED
Status: DISCONTINUED | OUTPATIENT
Start: 2025-07-06 | End: 2025-07-08 | Stop reason: HOSPADM

## 2025-07-06 RX ORDER — MULTIPLE VITAMINS W/ MINERALS TAB 9MG-400MCG
1 TAB ORAL DAILY
Status: DISCONTINUED | OUTPATIENT
Start: 2025-07-06 | End: 2025-07-08 | Stop reason: HOSPADM

## 2025-07-06 RX ORDER — ONDANSETRON 4 MG/1
4 TABLET, ORALLY DISINTEGRATING ORAL EVERY 6 HOURS PRN
Status: DISCONTINUED | OUTPATIENT
Start: 2025-07-06 | End: 2025-07-08 | Stop reason: HOSPADM

## 2025-07-06 RX ORDER — METOPROLOL TARTRATE 50 MG
50 TABLET ORAL EVERY 12 HOURS SCHEDULED
Status: DISCONTINUED | OUTPATIENT
Start: 2025-07-06 | End: 2025-07-08 | Stop reason: HOSPADM

## 2025-07-06 RX ORDER — ALBUTEROL SULFATE 0.83 MG/ML
2.5 SOLUTION RESPIRATORY (INHALATION) EVERY 6 HOURS PRN
Status: DISCONTINUED | OUTPATIENT
Start: 2025-07-06 | End: 2025-07-08 | Stop reason: HOSPADM

## 2025-07-06 RX ORDER — SODIUM CHLORIDE 0.9 % (FLUSH) 0.9 %
10 SYRINGE (ML) INJECTION EVERY 12 HOURS SCHEDULED
Status: DISCONTINUED | OUTPATIENT
Start: 2025-07-06 | End: 2025-07-08 | Stop reason: HOSPADM

## 2025-07-06 RX ORDER — ASPIRIN 81 MG/1
81 TABLET ORAL DAILY
Status: DISCONTINUED | OUTPATIENT
Start: 2025-07-06 | End: 2025-07-08 | Stop reason: HOSPADM

## 2025-07-06 RX ORDER — LATANOPROST 50 UG/ML
1 SOLUTION/ DROPS OPHTHALMIC NIGHTLY
Status: DISCONTINUED | OUTPATIENT
Start: 2025-07-06 | End: 2025-07-08 | Stop reason: HOSPADM

## 2025-07-06 RX ORDER — L.ACID,PARA/B.BIFIDUM/S.THERM 8B CELL
1 CAPSULE ORAL DAILY
Status: DISCONTINUED | OUTPATIENT
Start: 2025-07-06 | End: 2025-07-08 | Stop reason: HOSPADM

## 2025-07-06 RX ORDER — NITROGLYCERIN 0.4 MG/1
0.4 TABLET SUBLINGUAL
Status: DISCONTINUED | OUTPATIENT
Start: 2025-07-06 | End: 2025-07-06

## 2025-07-06 RX ORDER — NITROGLYCERIN 0.4 MG/1
0.4 TABLET SUBLINGUAL
Status: DISCONTINUED | OUTPATIENT
Start: 2025-07-06 | End: 2025-07-08 | Stop reason: HOSPADM

## 2025-07-06 RX ORDER — ACETAMINOPHEN 160 MG/5ML
650 SOLUTION ORAL EVERY 4 HOURS PRN
Status: DISCONTINUED | OUTPATIENT
Start: 2025-07-06 | End: 2025-07-08 | Stop reason: HOSPADM

## 2025-07-06 RX ORDER — TRAZODONE HYDROCHLORIDE 50 MG/1
50 TABLET ORAL NIGHTLY
Status: DISCONTINUED | OUTPATIENT
Start: 2025-07-06 | End: 2025-07-08 | Stop reason: HOSPADM

## 2025-07-06 RX ADMIN — PANTOPRAZOLE SODIUM 40 MG: 40 TABLET, DELAYED RELEASE ORAL at 20:25

## 2025-07-06 RX ADMIN — SODIUM CHLORIDE, POTASSIUM CHLORIDE, SODIUM LACTATE AND CALCIUM CHLORIDE 1000 ML: 600; 310; 30; 20 INJECTION, SOLUTION INTRAVENOUS at 12:37

## 2025-07-06 RX ADMIN — TIMOLOL MALEATE 1 DROP: 2.5 SOLUTION OPHTHALMIC at 20:26

## 2025-07-06 RX ADMIN — AVOBENZONE, HOMOSALATE, OCTISALATE, OCTOCRYLENE, AND OXYBENZONE 1 PACKET: 29.4; 147; 49; 25.4; 58.8 LOTION TOPICAL at 20:26

## 2025-07-06 RX ADMIN — TRAMADOL HYDROCHLORIDE 50 MG: 50 TABLET, COATED ORAL at 20:25

## 2025-07-06 RX ADMIN — TRAZODONE HYDROCHLORIDE 50 MG: 50 TABLET ORAL at 20:32

## 2025-07-06 RX ADMIN — Medication 1000 MCG: at 20:26

## 2025-07-06 RX ADMIN — MAGNESIUM OXIDE TAB 400 MG (240 MG ELEMENTAL MG) 400 MG: 400 (240 MG) TAB at 20:24

## 2025-07-06 RX ADMIN — ACETAMINOPHEN 650 MG: 325 TABLET, FILM COATED ORAL at 20:23

## 2025-07-06 RX ADMIN — SODIUM CHLORIDE 75 ML/HR: 9 INJECTION, SOLUTION INTRAVENOUS at 19:16

## 2025-07-06 RX ADMIN — BUDESONIDE AND FORMOTEROL FUMARATE DIHYDRATE 2 PUFF: 160; 4.5 AEROSOL RESPIRATORY (INHALATION) at 19:10

## 2025-07-06 RX ADMIN — Medication 10 ML: at 20:32

## 2025-07-06 RX ADMIN — METOPROLOL TARTRATE 50 MG: 50 TABLET, FILM COATED ORAL at 17:51

## 2025-07-06 RX ADMIN — Medication 1 CAPSULE: at 20:25

## 2025-07-06 RX ADMIN — ATORVASTATIN CALCIUM 40 MG: 20 TABLET, FILM COATED ORAL at 20:24

## 2025-07-06 RX ADMIN — LATANOPROST 1 DROP: 50 SOLUTION OPHTHALMIC at 20:26

## 2025-07-06 RX ADMIN — DULOXETINE 30 MG: 30 CAPSULE, DELAYED RELEASE ORAL at 20:25

## 2025-07-06 NOTE — NURSING NOTE
Pt a/o x4 when admitted on floor pt hard of hearing assist x1 pt requested diet order and tramadol dr was notified about request at bed side pt was educated on how to use call light family at bed side no other concerns as present plan of care on going

## 2025-07-06 NOTE — H&P
HISTORY AND PHYSICAL   [facility]        Patient Identification:  Name: Bo Adan  Age: 89 y.o.  Sex: female  :  10/24/1930  MRN: [id number]                     Primary Care Physician: Hernan Cruz MD    Chief Complaint: Diarrhea    History of Present Illness:   Pleasant 89-year-old female with a longstanding history of IBS with cyclic constipation and diarrhea.  She presents complaining of the diarrhea worsening.  She states the diarrhea is liquid and black but review of records is notable that she frequently describes it as such and there has been no evidence of GI blood loss in her previous workups.  She is vague as to the frequency that her son said that she had said she was up 3 times last night.  No mucus in the stool.  No nausea or vomiting.  No fever sweats or chills.  I am informed secondhand that she had recently been constipated and her caregivers were giving her some kind of gummy bear with a laxative and it prior to her developing diarrhea.  In the emergency room she has noted to be having runs of PSVT which were asymptomatic..  On questioning she does complain of her bottom feeling sore.        Past Medical History:  Past Medical History:   Diagnosis Date    Arthritis     Cancer     cervical CA 2018 finished radiation    Chronic systolic CHF (congestive heart failure) 2022    COVID 2021    Elevated cholesterol     Enteritis     GERD (gastroesophageal reflux disease)     Hypertension     Pneumonia 2021     Past Surgical History:  Past Surgical History:   Procedure Laterality Date    CHOLECYSTECTOMY      COLONOSCOPY      ENDOSCOPY      FRACTURE SURGERY      2021      Home Meds:  Medications Prior to Admission   Medication Sig Dispense Refill Last Dose/Taking    acetaminophen (TYLENOL) 500 MG tablet Take 1 tablet by mouth Every 6 (Six) Hours As Needed for Mild Pain . 30 tablet 0     albuterol sulfate  (90 Base) MCG/ACT inhaler Inhale 2 puffs Every  4 (Four) Hours As Needed for Wheezing. 6.7 g 0     amLODIPine (NORVASC) 5 MG tablet Take 5 mg by mouth Daily.       aspirin 81 MG EC tablet Take 81 mg by mouth Daily.       atorvastatin (LIPITOR) 40 MG tablet Take 40 mg by mouth Daily.       budesonide-formoterol (SYMBICORT) 160-4.5 MCG/ACT inhaler Inhale 2 puffs 2 (Two) Times a Day. 10.2 g 0     carvedilol (COREG) 25 MG tablet Take 25 mg by mouth 2 (Two) Times a Day With Meals.       dilTIAZem (CARDIZEM) 30 MG tablet Take 1 tablet by mouth Daily. (Patient taking differently: Take 1 tablet by mouth 2 (Two) Times a Day.) 30 tablet 0     DULoxetine (CYMBALTA) 20 MG capsule Take 30 mg by mouth Daily. Taken at evening        folic acid (FOLVITE) 1 MG tablet Take 1 mg by mouth Daily.       hydrocortisone (ANUSOL-HC) 25 MG suppository Insert 1 suppository into the rectum 2 (Two) Times a Day. 60 suppository 1     lactobacillus acidophilus (RISAQUAD) capsule capsule Take 1 capsule by mouth Daily. 30 capsule 0     latanoprost (XALATAN) 0.005 % ophthalmic solution 1 drop Every Night.       Melatonin 1 MG/4ML liquid 5 mg.       multivitamin with minerals tablet tablet Take 1 tablet by mouth Daily.       ondansetron ODT (ZOFRAN-ODT) 4 MG disintegrating tablet DISSOLVE 1 TABLET IN MOUTH EVERY 8 HOURS AS NEEDED FOR NAUSEA FOR 7 DAYS       pantoprazole (PROTONIX) 40 MG EC tablet Take 40 mg by mouth Daily.       timolol (TIMOPTIC) 0.25 % ophthalmic solution 1 drop 2 (Two) Times a Day.       traMADol (ULTRAM) 50 MG tablet Take 50 mg by mouth Every 8 (Eight) Hours As Needed for Moderate Pain .       traZODone (DESYREL) 50 MG tablet Take 50 mg by mouth Every Night.       vitamin B-12 (CYANOCOBALAMIN) 1000 MCG tablet Take 1,000 mcg by mouth Daily.          Allergies:  Allergies   Allergen Reactions    Bactrim [Sulfamethoxazole-Trimethoprim] GI Intolerance    Doxycycline GI Intolerance    Fosamax [Alendronate] GI Intolerance    Lactose Intolerance (Gi) GI Intolerance    Macrodantin  "[Nitrofurantoin] GI Intolerance    Naproxen GI Intolerance    Zestril [Lisinopril] GI Intolerance     Immunizations:  Immunization History   Administered Date(s) Administered    COVID-19 (PFIZER) Purple Cap Monovalent 2021, 2021, 10/26/2021     Social History:   Social History     Social History Narrative    Not on file     Social History     Tobacco Use    Smoking status: Never    Smokeless tobacco: Never   Substance Use Topics    Alcohol use: Not Currently     Family History:  History reviewed. No pertinent family history.     Review of Systems  Review of Systems   Constitutional: Negative.    HENT: Negative.     Eyes: Negative.    Respiratory: Negative.     Cardiovascular: Negative.    Gastrointestinal:         As per history of present illness.   Endocrine: Negative.    Genitourinary:         Patient states that sometimes and she has diarrhea that \"it hurts where I pee\".  Reportedly recent received antibiotics for UTI.   Musculoskeletal: Negative.    Skin:         As per history of present illness.   Allergic/Immunologic: Negative.    Neurological: Negative.    Hematological: Negative.    Psychiatric/Behavioral: Negative.         Objective:  T Max 24 hrs: Temp (24hrs), Av.8 °F (36.6 °C), Min:97.3 °F (36.3 °C), Max:98.2 °F (36.8 °C)    Vitals Ranges:   Temp:  [97.3 °F (36.3 °C)-98.2 °F (36.8 °C)] 97.3 °F (36.3 °C)  Heart Rate:  [] 97  Resp:  [17-18] 18  BP: (154-171)/() 171/97      Exam:  Physical Exam  Constitutional:       General: She is not in acute distress.     Appearance: She is not ill-appearing, toxic-appearing or diaphoretic.      Comments: Exceedingly thin and frail.   HENT:      Head: Normocephalic and atraumatic.      Right Ear: External ear normal.      Left Ear: External ear normal.      Nose: Nose normal.      Mouth/Throat:      Mouth: Mucous membranes are dry.   Eyes:      General: No scleral icterus.        Right eye: No discharge.         Left eye: No discharge.      " Extraocular Movements: Extraocular movements intact.      Conjunctiva/sclera: Conjunctivae normal.   Cardiovascular:      Heart sounds:      No friction rub. No gallop.      Comments: She became tachycardic for about a minute and then went back to regular rate and rhythm.  Pulmonary:      Effort: Pulmonary effort is normal. No respiratory distress.      Breath sounds: No stridor. Rales present. No wheezing or rhonchi.      Comments: Scattered very fine dry fibrotic sounding rales.  Abdominal:      General: Abdomen is flat. Bowel sounds are normal. There is no distension.      Palpations: Abdomen is soft. There is no mass.      Tenderness: There is no abdominal tenderness. There is no guarding or rebound.      Hernia: No hernia is present.   Musculoskeletal:      Cervical back: Neck supple.      Right lower leg: No edema.      Left lower leg: No edema.   Skin:     General: Skin is warm and dry.      Comments: There is mild induration at the tip of the coccyx.  No skin break appreciated.   Neurological:      General: No focal deficit present.      Mental Status: She is alert.      Comments: Oriented to person and place.  Cooperative and pleasant.   Psychiatric:         Mood and Affect: Mood normal.         Behavior: Behavior normal.         Thought Content: Thought content normal.         Data Review:  All labs and radiology reviewed.    Assessment:  PSVT: Uncertain of the accuracy of her home medication list but I am going to start her on metoprolol 50 mg p.o. twice daily.  Monitor.  Cardiology consult.  Hypertension: Again I am uncertain of the accuracy of her medication list.  I plan to start over anyway as noted above.  Choledocholithiasis: No CT report.  Asymptomatic with normal LFTs.  I will get a GI opinion.  Irritable bowel syndrome: Initiate fiber supplements.  Try to avoid the back-and-forth of laxatives....  Hypomagnesemia: Recurrent issue.  Presently mild.  Initiate oral supplements.  Macrocytosis: Appears  to have started about March 2022.  Check B12, folate, TSH, reticulocyte count...  Malnutrition: Nutrition evaluation.  Suspect interstitial lung disease: Based on both exam and CT scan.  Presently asymptomatic.  Consider pulmonary follow-up.        Plan:  Please see above.  Discussed with patient and son at bedside.  Discussed with RNs.  Discussed with ER provider.    Joshua Kim MD  7/6/2025  17:33 EDT    EMR Dragon/Transcription disclaimer:   Much of this encounter note is an electronic transcription/translation of spoken language to printed text. The electronic translation of spoken language may permit erroneous, or at times, nonsensical words or phrases to be inadvertently transcribed; Although I have reviewed the note for such errors, some may still exist.

## 2025-07-06 NOTE — ED NOTES
Nursing report ED to floor  Bo Adan  89 y.o.  female    HPI :  HPI  Stated Reason for Visit: abd pain    Chief Complaint  Chief Complaint   Patient presents with    Abdominal Pain    Diarrhea       Admitting doctor:   No admitting provider for patient encounter.    Admitting diagnosis:   There were no encounter diagnoses.    Code status:   Current Code Status       Date Active Code Status Order ID Comments User Context       Prior            Allergies:   Bactrim [sulfamethoxazole-trimethoprim], Doxycycline, Fosamax [alendronate], Lactose intolerance (gi), Macrodantin [nitrofurantoin], Naproxen, and Zestril [lisinopril]    Isolation:   Contact    Intake and Output  No intake or output data in the 24 hours ending 07/06/25 1457    Weight:   There were no vitals filed for this visit.    Most recent vitals:   Vitals:    07/06/25 1200 07/06/25 1234 07/06/25 1354 07/06/25 1415   BP: 157/92 163/83  (!) 154/109   BP Location:  Left arm     Patient Position:  Lying     Pulse: 93 87 (!) 151 (!) 145   Resp:  18 18 18   Temp:       TempSrc:       SpO2: 100% 97% 96% 96%       Active LDAs/IV Access:   Lines, Drains & Airways       Active LDAs       Name Placement date Placement time Site Days    Peripheral IV 07/06/25 1234 20 G Right Antecubital 07/06/25  1234  Antecubital  less than 1                    Labs (abnormal labs have a star):   Labs Reviewed   COMPREHENSIVE METABOLIC PANEL - Abnormal; Notable for the following components:       Result Value    Glucose 100 (*)     Sodium 133 (*)     All other components within normal limits    Narrative:     GFR Categories in Chronic Kidney Disease (CKD)              GFR Category          GFR (mL/min/1.73)    Interpretation  G1                    90 or greater        Normal or high (1)  G2                    60-89                Mild decrease (1)  G3a                   45-59                Mild to moderate decrease  G3b                   30-44                Moderate to severe  decrease  G4                    15-29                Severe decrease  G5                    14 or less           Kidney failure    (1)In the absence of evidence of kidney disease, neither GFR category G1 or G2 fulfill the criteria for CKD.    eGFR calculation 2021 CKD-EPI creatinine equation, which does not include race as a factor   CBC WITH AUTO DIFFERENTIAL - Abnormal; Notable for the following components:    RBC 3.55 (*)     Hemoglobin 11.9 (*)     .6 (*)     MCH 33.5 (*)     MCHC 31.2 (*)     All other components within normal limits   MANUAL DIFFERENTIAL - Abnormal; Notable for the following components:    Neutrophil % 84.7 (*)     Lymphocyte % 8.2 (*)     Eosinophil % 0.0 (*)     Lymphocytes Absolute 0.59 (*)     All other components within normal limits   URINALYSIS W/ MICROSCOPIC IF INDICATED (NO CULTURE) - Abnormal; Notable for the following components:    Protein, UA Trace (*)     All other components within normal limits    Narrative:     Urine microscopic not indicated.   CBC AND DIFFERENTIAL    Narrative:     The following orders were created for panel order CBC & Differential.  Procedure                               Abnormality         Status                     ---------                               -----------         ------                     CBC Auto Differential[[id number]]        Abnormal            Final result                 Please view results for these tests on the individual orders.       EKG:   ECG 12 Lead Tachycardia   Preliminary Result   HEART CDOB=122  bpm   RR Wynysqcw=029  ms   MS Interval=98  ms   P Horizontal Axis=152  deg   P Front Axis=154  deg   QRSD Interval=81  ms   QT Eytpyoqk=900  ms   JGjI=354  ms   QRS Axis=-48  deg   T Wave Axis=84  deg   - ABNORMAL ECG -   Supraventricular tachycardia   LVH with secondary repolarization abnormality   Repolarization abnormalities   Inferior infarct, old   Anterior infarct, old   When compared with ECG of 13-May-2021 23:34:41,    Significant change in rhythm: previously sinus   Significant axis, voltage or hypertrophy change   Date and Time of Study:2025-07-06 14:16:41          Meds given in ED:   Medications   sodium chloride 0.9 % flush 10 mL (has no administration in time range)   lactated ringers bolus 1,000 mL (0 mL Intravenous Stopped 7/6/25 1434)       Imaging results:  CT Abdomen Pelvis Without Contrast  Result Date: 7/6/2025  FINDINGS AND IMPRESSION: THERE IS RETICULONODULAR AND GROUNDGLASS OPACIFICATION WITHIN THE BILATERAL LUNG BASES, ASYMMETRIC PULM OPACIFICATION IN THE MEDIAL ASPECT OF THE RIGHT LOWER LUNG IS PRESENT, ALSO IN PART SEEN IN 2022. HOWEVER, THE FINDINGS WITHIN THE REMAINDER OF THE LUNGS APPEARS TO HAVE WORSENED. 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 CORRELATION WITH PATIENT HISTORY RECOMMENDED TO EXCLUDE ATYPICAL PNEUMONIA. ADDITIONALLY, FOLLOW-UP OF THE PULMONARY NODULARITY WITHIN THE RIGHT MIDDLE LOBE MEASURING UP TO APPROXIMATE 0.6 CM WITH CHEST CT IN 6 MONTHS IS RECOMMENDED TO ENSURE STABILITY.  No free intraperitoneal air is seen.  THERE IS SOFT TISSUE THICKENING OVERLYING THE DISTAL COCCYX AND SACRUM IN CORRELATION WITH PATIENT HISTORY RECOMMENDED TO EXCLUDE DECUBITUS ULCER. WHILE NO DISCRETE OSTEOLYSIS IS SEEN, OSTEOMYELITIS CANNOT BE EXCLUDED AND IF THERE IS CLINICAL CONCERN, PLAN IS TO BE FURTHER EVALUATED WITH MRI OF THE PELVIS WITH AND WITHOUT CONTRAST IF CLINICALLY INDICATED.  Small amount free fluid is present in pelvis, nonspecific. This the appendix is unremarkable. Air-fluid levels are present within the large and small bowel is otherwise nonspecific. No abdominopelvic adenopathy by size criteria.  Streak artifact from left femoral hardware which is only partially seen and cannot be evaluated, limits evaluation of the pelvis. Findings of sacral insufficiency fractures, as before, with interval  healing.  MODERATE TO SEVERE INTRAHEPATIC BILI DUCT DILATION IS PRESENT STATUS POST CHOLECYSTECTOMY, AS BEFORE. THERE IS PNEUMOBILIA.. THERE APPEAR TO BE 2 DISCRETE HYPERDENSE NODULES WITHIN THE DISTAL COMMON BILE DUCT IN KEEPING WITH CHOLEDOCHOLITHIASIS. FURTHER EVALUATION WITH ERCP IS RECOMMENDED. FINDINGS WERE DISCUSSED WITH DR. PEDRAZA   BY TELEPHONE.  Subcentimeter renal lesions are too small to characterize. Larger hypodense lesions demonstrate density less than 15 acidly is likely benign Bosniak 2018 criteria. No hydronephrosis or visualized renal calculus. The bladder is moderately distended.  The spleen, pancreas and adrenal glands have an unremarkable noncontrast CT appearance advanced degenerative changes are present throughout the visualized spine can be further evaluated with MRI if clinically indicated. For the purposes of this dictation, the last well-formed disc space referred to as L5-S1. Compression deformity of the L5 vertebral body is present, as before.              Ambulatory status:   - Uses walker at home; one person assist to BSC    Social issues:   Social History     Socioeconomic History    Marital status:    Tobacco Use    Smoking status: Never    Smokeless tobacco: Never   Substance and Sexual Activity    Alcohol use: Not Currently    Drug use: Not Currently    Sexual activity: Defer       Peripheral Neurovascular  Peripheral Neurovascular (Adult)  Peripheral Neurovascular WDL: WDL    Neuro Cognitive  Neuro Cognitive (Adult)  Cognitive/Neuro/Behavioral WDL: WDL    Learning  Learning Assessment  Learning Readiness and Ability: sensory deficit noted    Respiratory  Respiratory WDL  Respiratory WDL: WDL, rhythm/pattern  Rhythm/Pattern, Respiratory: depth regular, pattern regular, unlabored    Abdominal Pain       Pain Assessments  Pain (Adult)  (0-10) Pain Rating: Rest: 9    NIH Stroke Scale       Kathryn Mejia RN  07/06/25 14:57 EDT

## 2025-07-06 NOTE — ED NOTES
"Patient c/o abdominal pain and diarrhea x 3 days. Family states patient has switched from diarrhea to constipation to diarrhea again. Family states patient finished an antibiotic for a UTI this past Friday. Family states that the patient had cervical cancer and internal radiation and has had issues with her bowel movements since. Bright red blood in BSC after patient attempted to have a bm. Patient describes her bm as \"sticky\" and \"tree bark.\"   "

## 2025-07-06 NOTE — ED PROVIDER NOTES
EMERGENCY DEPARTMENT ENCOUNTER    Room Number:  23/23  PCP: Hernan Cruz MD  Historian: Patient, family member    I initially evaluated the patient at 12:04 PM    HPI:  Chief Complaint: Diarrhea  A complete HPI/ROS/PMH/PSH/SH/FH are unobtainable due to: Nothing  Context: Bo Adan is a 89 y.o. female with a medical history of GERD, enteritis, hypertension, CHF who presents to the ED c/o acute diarrhea for the past 4 days.  Patient has had nausea and multiple episodes of watery nonbloody stool.  She has had intermittent lower abdominal cramping.  She has been able to eat and drink fluids.  Denies fever, chills, chest pain, shortness of breath, or flank pain.  She was recently treated for UTI.  Family reports that the patient has had alternating constipation and diarrhea for at least the past year.  She has seen a gastroenterologist but was never given a definitive diagnosis.  Patient declined pursuing a colonoscopy.  She has had a cholecystectomy.            PAST MEDICAL HISTORY  Active Ambulatory Problems     Diagnosis Date Noted    Anemia 03/04/2021    Diarrhea of presumed infectious origin 05/12/2021    Essential hypertension 05/12/2021    GERD without esophagitis 05/12/2021    History of cervical cancer 05/12/2021    History of pancreatitis 05/12/2021    HLD (hyperlipidemia) 05/12/2021    Chest pain 05/12/2021    Diarrhea 05/13/2021    Enteritis 05/13/2021    Hypokalemia 05/13/2021    Hypomagnesemia 05/13/2021    Occult GI bleeding 01/09/2022    Pneumonia of right lower lobe due to methicillin resistant Staphylococcus aureus (MRSA) 01/10/2022    Chronic systolic CHF (congestive heart failure) 01/11/2022    Severe malnutrition 01/11/2022     Resolved Ambulatory Problems     Diagnosis Date Noted    No Resolved Ambulatory Problems     Past Medical History:   Diagnosis Date    Arthritis     Cancer     COVID 12/2021    Elevated cholesterol     GERD (gastroesophageal reflux disease)     Hypertension      Pneumonia 12/2021         PAST SURGICAL HISTORY  Past Surgical History:   Procedure Laterality Date    CHOLECYSTECTOMY      COLONOSCOPY      ENDOSCOPY      FRACTURE SURGERY      feb 23 2021         FAMILY HISTORY  History reviewed. No pertinent family history.      SOCIAL HISTORY  Social History     Socioeconomic History    Marital status:    Tobacco Use    Smoking status: Never    Smokeless tobacco: Never   Substance and Sexual Activity    Alcohol use: Not Currently    Drug use: Not Currently    Sexual activity: Defer         ALLERGIES  Bactrim [sulfamethoxazole-trimethoprim], Doxycycline, Fosamax [alendronate], Lactose intolerance (gi), Macrodantin [nitrofurantoin], Naproxen, and Zestril [lisinopril]    REVIEW OF SYSTEMS  Review of Systems  Included in HPI  All systems reviewed and negative except for those discussed in HPI.      PHYSICAL EXAM  ED Triage Vitals   Temp Heart Rate Resp BP SpO2   07/06/25 1124 07/06/25 1124 07/06/25 1124 07/06/25 1136 07/06/25 1124   98.2 °F (36.8 °C) 95 17 170/99 93 %      Temp src Heart Rate Source Patient Position BP Location FiO2 (%)   07/06/25 1124 -- 07/06/25 1136 07/06/25 1136 --   Oral  Sitting Left arm        Physical Exam      GENERAL: Awake and alert.  Nontoxic-appearing frail elderly female.  Resting comfortably in no acute distress  HENT: NCAT, nares patent, mildly dry mucous membranes  EYES: no scleral icterus  CV: regular rhythm, normal rate  RESPIRATORY: normal effort, clear to auscultation bilaterally  ABDOMEN: soft, nondistended, there is left lower quadrant tenderness without rebound or guarding, no CVA tenderness  MUSCULOSKELETAL: Extremities are nontender with full range of motion  NEURO: Speech is normal.  No facial droop.  PSYCH:  calm, cooperative  SKIN: warm, dry    Vital signs and nursing notes reviewed.          LAB RESULTS  Recent Results (from the past 24 hours)   Comprehensive Metabolic Panel    Collection Time: 07/06/25 12:34 PM    Specimen:  Blood   Result Value Ref Range    Glucose 100 (H) 65 - 99 mg/dL    BUN 9.0 8.0 - 23.0 mg/dL    Creatinine 0.57 0.57 - 1.00 mg/dL    Sodium 133 (L) 136 - 145 mmol/L    Potassium 4.2 3.5 - 5.2 mmol/L    Chloride 99 98 - 107 mmol/L    CO2 25.4 22.0 - 29.0 mmol/L    Calcium 9.6 8.2 - 9.6 mg/dL    Total Protein 7.3 6.0 - 8.5 g/dL    Albumin 3.8 3.5 - 5.2 g/dL    ALT (SGPT) 9 1 - 33 U/L    AST (SGOT) 23 1 - 32 U/L    Alkaline Phosphatase 89 39 - 117 U/L    Total Bilirubin 0.3 0.0 - 1.2 mg/dL    Globulin 3.5 gm/dL    A/G Ratio 1.1 g/dL    BUN/Creatinine Ratio 15.8 7.0 - 25.0    Anion Gap 8.6 5.0 - 15.0 mmol/L    eGFR 84.3 >60.0 mL/min/1.73   CBC Auto Differential    Collection Time: 07/06/25 12:34 PM    Specimen: Blood   Result Value Ref Range    WBC 5.79 3.40 - 10.80 10*3/mm3    RBC 3.55 (L) 3.77 - 5.28 10*6/mm3    Hemoglobin 11.9 (L) 12.0 - 15.9 g/dL    Hematocrit 38.2 34.0 - 46.6 %    .6 (H) 79.0 - 97.0 fL    MCH 33.5 (H) 26.6 - 33.0 pg    MCHC 31.2 (L) 31.5 - 35.7 g/dL    RDW 13.0 12.3 - 15.4 %    RDW-SD 51.7 37.0 - 54.0 fl    MPV 9.5 6.0 - 12.0 fL    Platelets 231 140 - 450 10*3/mm3   Manual Differential    Collection Time: 07/06/25 12:34 PM    Specimen: Blood   Result Value Ref Range    Neutrophil % 84.7 (H) 42.7 - 76.0 %    Lymphocyte % 8.2 (L) 19.6 - 45.3 %    Monocyte % 5.1 5.0 - 12.0 %    Eosinophil % 0.0 (L) 0.3 - 6.2 %    Basophil % 0.0 0.0 - 1.5 %    Atypical Lymphocyte % 2.0 0.0 - 5.0 %    Neutrophils Absolute 4.90 1.70 - 7.00 10*3/mm3    Lymphocytes Absolute 0.59 (L) 0.70 - 3.10 10*3/mm3    Monocytes Absolute 0.30 0.10 - 0.90 10*3/mm3    Eosinophils Absolute 0.00 0.00 - 0.40 10*3/mm3    Basophils Absolute 0.00 0.00 - 0.20 10*3/mm3    Anisocytosis Mod/2+ None Seen    Macrocytes Mod/2+ None Seen    WBC Morphology Normal Normal    Platelet Morphology Normal Normal   ECG 12 Lead Tachycardia    Collection Time: 07/06/25  2:16 PM   Result Value Ref Range    QT Interval 316 ms    QTC Interval 488 ms    Urinalysis With Microscopic If Indicated (No Culture) - Straight Cath    Collection Time: 07/06/25  2:19 PM    Specimen: Straight Cath; Urine   Result Value Ref Range    Color, UA Yellow Yellow, Straw    Appearance, UA Clear Clear    pH, UA 7.5 5.0 - 8.0    Specific Gravity, UA <=1.005 1.005 - 1.030    Glucose, UA Negative Negative    Ketones, UA Negative Negative    Bilirubin, UA Negative Negative    Blood, UA Negative Negative    Protein, UA Trace (A) Negative    Leuk Esterase, UA Negative Negative    Nitrite, UA Negative Negative    Urobilinogen, UA 0.2 E.U./dL 0.2 - 1.0 E.U./dL       Ordered the above labs and reviewed the results.        RADIOLOGY  CT Abdomen Pelvis Without Contrast  Result Date: 7/6/2025  CT ABDOMEN AND PELVIS WITHOUT IV CONTRAST  HISTORY: Diarrhea, abdominal cramping  TECHNIQUE: Radiation dose reduction techniques were utilized, including automated exposure control and exposure modulation based on body size. 3 mm images were obtained through the abdomen and pelvis without IV contrast.  COMPARISON: CT abdomen pelvis 1/8/2022      FINDINGS AND IMPRESSION: THERE IS RETICULONODULAR AND GROUNDGLASS OPACIFICATION WITHIN THE BILATERAL LUNG BASES, ASYMMETRIC PULM OPACIFICATION IN THE MEDIAL ASPECT OF THE RIGHT LOWER LUNG IS PRESENT, ALSO IN PART SEEN IN 2022. HOWEVER, THE FINDINGS WITHIN THE REMAINDER OF THE LUNGS APPEARS TO HAVE WORSENED. 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 CORRELATION WITH PATIENT HISTORY RECOMMENDED TO EXCLUDE ATYPICAL PNEUMONIA. ADDITIONALLY, FOLLOW-UP OF THE PULMONARY NODULARITY WITHIN THE RIGHT MIDDLE LOBE MEASURING UP TO APPROXIMATE 0.6 CM WITH CHEST CT IN 6 MONTHS IS RECOMMENDED TO ENSURE STABILITY.  No free intraperitoneal air is seen.  THERE IS SOFT TISSUE THICKENING OVERLYING THE DISTAL COCCYX AND SACRUM IN CORRELATION WITH PATIENT HISTORY RECOMMENDED TO EXCLUDE  DECUBITUS ULCER. WHILE NO DISCRETE OSTEOLYSIS IS SEEN, OSTEOMYELITIS CANNOT BE EXCLUDED AND IF THERE IS CLINICAL CONCERN, PLAN IS TO BE FURTHER EVALUATED WITH MRI OF THE PELVIS WITH AND WITHOUT CONTRAST IF CLINICALLY INDICATED.  Small amount free fluid is present in pelvis, nonspecific. This the appendix is unremarkable. Air-fluid levels are present within the large and small bowel is otherwise nonspecific. No abdominopelvic adenopathy by size criteria.  Streak artifact from left femoral hardware which is only partially seen and cannot be evaluated, limits evaluation of the pelvis. Findings of sacral insufficiency fractures, as before, with interval healing.  MODERATE TO SEVERE INTRAHEPATIC BILI DUCT DILATION IS PRESENT STATUS POST CHOLECYSTECTOMY, AS BEFORE. THERE IS PNEUMOBILIA.. THERE APPEAR TO BE 2 DISCRETE HYPERDENSE NODULES WITHIN THE DISTAL COMMON BILE DUCT IN KEEPING WITH CHOLEDOCHOLITHIASIS. FURTHER EVALUATION WITH ERCP IS RECOMMENDED. FINDINGS WERE DISCUSSED WITH DR. PEDRAZA   BY TELEPHONE.  Subcentimeter renal lesions are too small to characterize. Larger hypodense lesions demonstrate density less than 15 acidly is likely benign Bosniak 2018 criteria. No hydronephrosis or visualized renal calculus. The bladder is moderately distended.  The spleen, pancreas and adrenal glands have an unremarkable noncontrast CT appearance advanced degenerative changes are present throughout the visualized spine can be further evaluated with MRI if clinically indicated. For the purposes of this dictation, the last well-formed disc space referred to as L5-S1. Compression deformity of the L5 vertebral body is present, as before.              Ordered the above noted radiological studies. Reviewed by me in PACS.            PROCEDURES  Procedures      OUTPATIENT MEDICATION MANAGEMENT:  Current Facility-Administered Medications Ordered in Epic   Medication Dose Route Frequency Provider Last Rate Last Admin    nitroglycerin  (NITROSTAT) SL tablet 0.4 mg  0.4 mg Sublingual Q5 Min PRN Elijah Little MD        nitroglycerin (NITROSTAT) SL tablet 0.4 mg  0.4 mg Sublingual Q5 Min PRN Elijah Little MD        sodium chloride 0.9 % flush 10 mL  10 mL Intravenous PRN Elijah Little MD         Current Outpatient Medications Ordered in Epic   Medication Sig Dispense Refill    acetaminophen (TYLENOL) 500 MG tablet Take 1 tablet by mouth Every 6 (Six) Hours As Needed for Mild Pain . 30 tablet 0    albuterol sulfate  (90 Base) MCG/ACT inhaler Inhale 2 puffs Every 4 (Four) Hours As Needed for Wheezing. 6.7 g 0    amLODIPine (NORVASC) 5 MG tablet Take 5 mg by mouth Daily.      aspirin 81 MG EC tablet Take 81 mg by mouth Daily.      atorvastatin (LIPITOR) 40 MG tablet Take 40 mg by mouth Daily.      budesonide-formoterol (SYMBICORT) 160-4.5 MCG/ACT inhaler Inhale 2 puffs 2 (Two) Times a Day. 10.2 g 0    carvedilol (COREG) 25 MG tablet Take 1 tablet by mouth 2 (Two) Times a Day With Meals for 30 days. 60 tablet 0    carvedilol (COREG) 25 MG tablet Take 25 mg by mouth 2 (Two) Times a Day With Meals.      dilTIAZem (CARDIZEM) 30 MG tablet Take 1 tablet by mouth Daily. (Patient taking differently: Take 1 tablet by mouth 2 (Two) Times a Day.) 30 tablet 0    DULoxetine (CYMBALTA) 20 MG capsule Take 30 mg by mouth Daily. Taken at evening       folic acid (FOLVITE) 1 MG tablet Take 1 mg by mouth Daily.      hydrocortisone (ANUSOL-HC) 25 MG suppository Insert 1 suppository into the rectum 2 (Two) Times a Day. 60 suppository 1    lactobacillus acidophilus (RISAQUAD) capsule capsule Take 1 capsule by mouth Daily. 30 capsule 0    latanoprost (XALATAN) 0.005 % ophthalmic solution 1 drop Every Night.      Melatonin 1 MG/4ML liquid 5 mg.      multivitamin with minerals tablet tablet Take 1 tablet by mouth Daily.      ondansetron ODT (ZOFRAN-ODT) 4 MG disintegrating tablet DISSOLVE 1 TABLET IN MOUTH EVERY 8 HOURS AS NEEDED FOR NAUSEA FOR 7 DAYS       pantoprazole (PROTONIX) 40 MG EC tablet Take 40 mg by mouth Daily.      timolol (TIMOPTIC) 0.25 % ophthalmic solution 1 drop 2 (Two) Times a Day.      traMADol (ULTRAM) 50 MG tablet Take 50 mg by mouth Every 8 (Eight) Hours As Needed for Moderate Pain .      traZODone (DESYREL) 50 MG tablet Take 50 mg by mouth Every Night.      vitamin B-12 (CYANOCOBALAMIN) 1000 MCG tablet Take 1,000 mcg by mouth Daily.             MEDICATIONS GIVEN IN ER  Medications   sodium chloride 0.9 % flush 10 mL (has no administration in time range)   nitroglycerin (NITROSTAT) SL tablet 0.4 mg (has no administration in time range)   nitroglycerin (NITROSTAT) SL tablet 0.4 mg (has no administration in time range)   lactated ringers bolus 1,000 mL (0 mL Intravenous Stopped 7/6/25 1434)                   MEDICAL DECISION MAKING, PROGRESS, and CONSULTS    All labs have been independently reviewed by me.  All radiology studies have been reviewed by me and I have also reviewed the radiology report.   EKG's independently viewed and interpreted by me.  Discussion below represents my analysis of pertinent findings related to patient's condition, differential diagnosis, treatment plan and final disposition.      Additional sources:    - Discussed/ obtained information from independent historians: Family at bedside    - External (non-ED) record review: Patient saw her PCP on 6/30/2025 for frequent urination and abdominal pain..  She was diagnosed with a UTI and started on Bactrim DS.  She has a history of chronic diarrhea and constipation.CT abdomen/pelvis done in January 2022 showed urinary bladder distention and stable intra and extrahepatic biliary dilatation.    -Prescription drug monitoring program review:     EM_[place] : N/A    - Chronic or social conditions impacting patient care (Social Determinants of Health): None          Orders placed during this visit:  Orders Placed This Encounter   Procedures    CT Abdomen Pelvis Without Contrast     Comprehensive Metabolic Panel    CBC Auto Differential    Manual Differential    Urinalysis With Microscopic If Indicated (No Culture) - Urine, Catheter    Telemetry - Place Orders & Notify Provider of Results When Patient Experiences Acute Chest Pain, Dysrhythmia or Respiratory Distress    May Be Off Telemetry for Tests    Maintain IV Access    Telemetry - Place Orders & Notify Provider of Results When Patient Experiences Acute Chest Pain, Dysrhythmia or Respiratory Distress    May Be Off Telemetry for Tests    LHA (on-call MD unless specified) Details    ECG 12 Lead Tachycardia    Insert Peripheral IV    Initiate Observation Status    CBC & Differential         Additional orders considered but not ordered:          Differential diagnosis includes, but is not limited to:    Differential diagnosis includes but is not limited to:  - hepatobiliary pathology such as cholecystitis, cholangitis, and symptomatic cholelithiasis  - Pancreatitis  - Dyspepsia  - Small bowel obstruction  - Appendicitis  - Diverticulitis  - UTI including pyelonephritis  - Ureteral stone  - Zoster  - Colitis, including infectious and ischemic  - Atypical ACS        Independent interpretation of labs, radiology studies, and discussions with consultants:  ED Course as of 07/06/25 1505   Sun Jul 06, 2025   1203 BP: 170/99 [WH]   1203 Temp: 98.2 °F (36.8 °C) [WH]   1203 Heart Rate: 95 [WH]   1203 Resp: 17 [WH]   1203 SpO2: 93 % [WH]   1203 Device (Oxygen Therapy): room air [WH]   1340 WBC: 5.79 [WH]   1340 Hemoglobin(!): 11.9  Stable [WH]   1340 Glucose(!): 100 [WH]   1340 BUN: 9.0 [WH]   1340 Creatinine: 0.57 [WH]   1340 Sodium(!): 133 [WH]   1341 CO2: 25.4 [WH]   1341 Anion Gap: 8.6 [WH]   1400 Patient's heart rate increased into the 140s.  Will obtain an EKG [WH]   1406 EKG personally interpreted by me at 1406          EKG time: 1404  Rhythm/Rate: Sinus rhythm, rate 91  P waves and LA: First-degree AV block  QRS, axis: LAD, LVH, inferior Q  waves  ST and T waves: Minimal ST depression in the lateral leads    Interpreted Contemporaneously by me, independently viewed  No prior available for comparison    [WH]   1411 CT abdomen/pelvis personally interpreted by me.  There is no bowel obstruction.  No obstructive uropathy. [WH]   1422 Repeat EKG personally interpreted by me at 1418          EKG time: 1416  Rhythm/Rate: SVT with a rate 141  QRS, axis: LAD, LVH, inferior Q waves  ST and T waves: Minimal ST depression in the anterior and lateral leads    Interpreted Contemporaneously by me, independently viewed  EKG is changed compared to EKG done today at 1404.  Sinus rhythm was present then   [WH]   1431 Per the radiologist, CT of the abdomen/pelvis, there is moderate to severe intrahepatic biliary ductal dilatation status postcholecystectomy, as before.  And pneumobilia.  There are 2 discrete hyperdense nodules in the distal common bile duct in keeping with choledocholithiasis. There is reticulonodular and groundglass opacification in bilateral lung bases which is worsened since 2022. See dictated report for details. [WH]   1439 Leukocytes, UA: Negative [WH]   1439 Nitrite, UA: Negative [WH]   1447 Patient is resting comfortably.  Monitor now shows sinus tachycardia with a rate of 110.  She has been unable to provide a stool specimen.  Test results and diagnoses were discussed with the patient and her family.  Shared decision making was discussed and admission was recommended.  They are agreeable with this. [WH]   1457 Case discussed with Dr. Kim, hospitalist, and he agrees to admit the patient to a monitored bed.  Pertinent history, exam findings, test results, ED course, and diagnoses were discussed with them. [WH]   1504 MDM: Patient presented to the ED complaining of acute diarrhea and intermittent lower abdominal cramping.  She was afebrile and did not have an acute abdomen.  White blood cell was normal.  CT scan showed biliary ductal dilatation  and probable choledocholithiasis.  She did not have any right upper quadrant tenderness and her LFTs were normal.  While in the ED, she had some intermittent episodes of what appeared to be SVT with a rate up to 140.  She does not have a history of this.  She was unable to provide a stool specimen while in the ED.  She did not have a UTI.  She will be admitted to the hospitalist for further evaluation. [WH]      ED Course User Index  [WH] Elijah Little MD         COMPLEXITY OF CARE  The patient requires admission.      DIAGNOSIS  Final diagnoses:   Paroxysmal SVT (supraventricular tachycardia)   Diarrhea, unspecified type   Choledocholithiasis         DISPOSITION  ADMISSION    Discussed treatment plan and reason for admission with pt/family and admitting physician.  Pt/family voiced understanding of the plan for admission for further testing/treatment as needed.               Latest Documented Vital Signs:  AS OF 15:05 EDT VITALS:    BP - (!) 154/109  HR - (!) 145  TEMP - 98.2 °F (36.8 °C) (Oral)  O2 SATS - 96%            --    Please note that portions of this were completed with a voice recognition program.       Note Disclaimer: At [facility], we believe that sharing information builds trust and better relationships. You are receiving this note because you are receiving care at [facility] or recently visited. It is possible you will see health information before a provider has talked with you about it. This kind of information can be easy to misunderstand. To help you fully understand what it means for your health, we urge you to discuss this note with your provider.             Elijah Little MD  07/06/25 5100

## 2025-07-07 ENCOUNTER — ON CAMPUS - OUTPATIENT (OUTPATIENT)
Dept: URBAN - METROPOLITAN AREA HOSPITAL 114 | Facility: HOSPITAL | Age: OVER 89
End: 2025-07-07
Payer: COMMERCIAL

## 2025-07-07 DIAGNOSIS — K59.00 CONSTIPATION, UNSPECIFIED: ICD-10-CM

## 2025-07-07 DIAGNOSIS — R93.89 ABNORMAL FINDINGS ON DIAGNOSTIC IMAGING OF OTHER SPECIFIED B: ICD-10-CM

## 2025-07-07 DIAGNOSIS — K62.89 OTHER SPECIFIED DISEASES OF ANUS AND RECTUM: ICD-10-CM

## 2025-07-07 DIAGNOSIS — R19.7 DIARRHEA, UNSPECIFIED: ICD-10-CM

## 2025-07-07 DIAGNOSIS — K80.50 CALCULUS OF BILE DUCT WITHOUT CHOLANGITIS OR CHOLECYSTITIS W: ICD-10-CM

## 2025-07-07 LAB
ALBUMIN SERPL-MCNC: 3.6 G/DL (ref 3.5–5.2)
ALBUMIN/GLOB SERPL: 1.1 G/DL
ALP SERPL-CCNC: 84 U/L (ref 39–117)
ALT SERPL W P-5'-P-CCNC: 8 U/L (ref 1–33)
ANION GAP SERPL CALCULATED.3IONS-SCNC: 10.9 MMOL/L (ref 5–15)
AST SERPL-CCNC: 25 U/L (ref 1–32)
BASOPHILS # BLD AUTO: 0.02 10*3/MM3 (ref 0–0.2)
BASOPHILS NFR BLD AUTO: 0.4 % (ref 0–1.5)
BILIRUB SERPL-MCNC: 0.4 MG/DL (ref 0–1.2)
BUN SERPL-MCNC: 8 MG/DL (ref 8–23)
BUN/CREAT SERPL: 16.7 (ref 7–25)
CALCIUM SPEC-SCNC: 9.2 MG/DL (ref 8.2–9.6)
CHLORIDE SERPL-SCNC: 102 MMOL/L (ref 98–107)
CO2 SERPL-SCNC: 23.1 MMOL/L (ref 22–29)
CREAT SERPL-MCNC: 0.48 MG/DL (ref 0.57–1)
DEPRECATED RDW RBC AUTO: 47.4 FL (ref 37–54)
EGFRCR SERPLBLD CKD-EPI 2021: 87.9 ML/MIN/1.73
EOSINOPHIL # BLD AUTO: 0.04 10*3/MM3 (ref 0–0.4)
EOSINOPHIL NFR BLD AUTO: 0.7 % (ref 0.3–6.2)
ERYTHROCYTE [DISTWIDTH] IN BLOOD BY AUTOMATED COUNT: 12.6 % (ref 12.3–15.4)
FOLATE SERPL-MCNC: >20 NG/ML (ref 4.78–24.2)
GLOBULIN UR ELPH-MCNC: 3.4 GM/DL
GLUCOSE SERPL-MCNC: 90 MG/DL (ref 65–99)
HCT VFR BLD AUTO: 37.9 % (ref 34–46.6)
HGB BLD-MCNC: 12.2 G/DL (ref 12–15.9)
IMM GRANULOCYTES # BLD AUTO: 0.03 10*3/MM3 (ref 0–0.05)
IMM GRANULOCYTES NFR BLD AUTO: 0.5 % (ref 0–0.5)
IRON 24H UR-MRATE: 63 MCG/DL (ref 37–145)
IRON SATN MFR SERPL: 19 % (ref 20–50)
LYMPHOCYTES # BLD AUTO: 0.77 10*3/MM3 (ref 0.7–3.1)
LYMPHOCYTES NFR BLD AUTO: 13.7 % (ref 19.6–45.3)
MAGNESIUM SERPL-MCNC: 1.7 MG/DL (ref 1.7–2.3)
MCH RBC QN AUTO: 33.3 PG (ref 26.6–33)
MCHC RBC AUTO-ENTMCNC: 32.2 G/DL (ref 31.5–35.7)
MCV RBC AUTO: 103.6 FL (ref 79–97)
MONOCYTES # BLD AUTO: 0.52 10*3/MM3 (ref 0.1–0.9)
MONOCYTES NFR BLD AUTO: 9.2 % (ref 5–12)
NEUTROPHILS NFR BLD AUTO: 4.26 10*3/MM3 (ref 1.7–7)
NEUTROPHILS NFR BLD AUTO: 75.5 % (ref 42.7–76)
NRBC BLD AUTO-RTO: 0 /100 WBC (ref 0–0.2)
PHOSPHATE SERPL-MCNC: 3.4 MG/DL (ref 2.5–4.5)
PLATELET # BLD AUTO: 230 10*3/MM3 (ref 140–450)
PMV BLD AUTO: 9.5 FL (ref 6–12)
POTASSIUM SERPL-SCNC: 3.7 MMOL/L (ref 3.5–5.2)
PROT SERPL-MCNC: 7 G/DL (ref 6–8.5)
RBC # BLD AUTO: 3.66 10*6/MM3 (ref 3.77–5.28)
RETICS # AUTO: 0.06 10*6/MM3 (ref 0.02–0.13)
RETICS/RBC NFR AUTO: 1.81 % (ref 0.7–1.9)
SODIUM SERPL-SCNC: 136 MMOL/L (ref 136–145)
TIBC SERPL-MCNC: 340 MCG/DL (ref 298–536)
TRANSFERRIN SERPL-MCNC: 228 MG/DL (ref 200–360)
VIT B12 BLD-MCNC: >2000 PG/ML (ref 211–946)
WBC NRBC COR # BLD AUTO: 5.64 10*3/MM3 (ref 3.4–10.8)

## 2025-07-07 PROCEDURE — 83735 ASSAY OF MAGNESIUM: CPT | Performed by: STUDENT IN AN ORGANIZED HEALTH CARE EDUCATION/TRAINING PROGRAM

## 2025-07-07 PROCEDURE — 85045 AUTOMATED RETICULOCYTE COUNT: CPT | Performed by: STUDENT IN AN ORGANIZED HEALTH CARE EDUCATION/TRAINING PROGRAM

## 2025-07-07 PROCEDURE — 99223 1ST HOSP IP/OBS HIGH 75: CPT | Performed by: INTERNAL MEDICINE

## 2025-07-07 PROCEDURE — 85025 COMPLETE CBC W/AUTO DIFF WBC: CPT | Performed by: STUDENT IN AN ORGANIZED HEALTH CARE EDUCATION/TRAINING PROGRAM

## 2025-07-07 PROCEDURE — 97162 PT EVAL MOD COMPLEX 30 MIN: CPT

## 2025-07-07 PROCEDURE — 80053 COMPREHEN METABOLIC PANEL: CPT | Performed by: STUDENT IN AN ORGANIZED HEALTH CARE EDUCATION/TRAINING PROGRAM

## 2025-07-07 PROCEDURE — 97530 THERAPEUTIC ACTIVITIES: CPT

## 2025-07-07 PROCEDURE — 99204 OFFICE O/P NEW MOD 45 MIN: CPT | Performed by: INTERNAL MEDICINE

## 2025-07-07 PROCEDURE — 97535 SELF CARE MNGMENT TRAINING: CPT

## 2025-07-07 PROCEDURE — 82746 ASSAY OF FOLIC ACID SERUM: CPT | Performed by: STUDENT IN AN ORGANIZED HEALTH CARE EDUCATION/TRAINING PROGRAM

## 2025-07-07 PROCEDURE — 51798 US URINE CAPACITY MEASURE: CPT

## 2025-07-07 PROCEDURE — 82607 VITAMIN B-12: CPT | Performed by: STUDENT IN AN ORGANIZED HEALTH CARE EDUCATION/TRAINING PROGRAM

## 2025-07-07 PROCEDURE — 83540 ASSAY OF IRON: CPT | Performed by: STUDENT IN AN ORGANIZED HEALTH CARE EDUCATION/TRAINING PROGRAM

## 2025-07-07 PROCEDURE — G0378 HOSPITAL OBSERVATION PER HR: HCPCS

## 2025-07-07 PROCEDURE — 94761 N-INVAS EAR/PLS OXIMETRY MLT: CPT

## 2025-07-07 PROCEDURE — 97166 OT EVAL MOD COMPLEX 45 MIN: CPT

## 2025-07-07 PROCEDURE — 94799 UNLISTED PULMONARY SVC/PX: CPT

## 2025-07-07 PROCEDURE — 84100 ASSAY OF PHOSPHORUS: CPT | Performed by: STUDENT IN AN ORGANIZED HEALTH CARE EDUCATION/TRAINING PROGRAM

## 2025-07-07 PROCEDURE — 84466 ASSAY OF TRANSFERRIN: CPT | Performed by: STUDENT IN AN ORGANIZED HEALTH CARE EDUCATION/TRAINING PROGRAM

## 2025-07-07 PROCEDURE — 94664 DEMO&/EVAL PT USE INHALER: CPT

## 2025-07-07 RX ADMIN — ASPIRIN 81 MG: 81 TABLET, COATED ORAL at 08:08

## 2025-07-07 RX ADMIN — Medication 10 ML: at 08:13

## 2025-07-07 RX ADMIN — BUDESONIDE AND FORMOTEROL FUMARATE DIHYDRATE 2 PUFF: 160; 4.5 AEROSOL RESPIRATORY (INHALATION) at 08:35

## 2025-07-07 RX ADMIN — MAGNESIUM OXIDE TAB 400 MG (240 MG ELEMENTAL MG) 400 MG: 400 (240 MG) TAB at 21:14

## 2025-07-07 RX ADMIN — LATANOPROST 1 DROP: 50 SOLUTION OPHTHALMIC at 21:15

## 2025-07-07 RX ADMIN — Medication 1 TABLET: at 08:09

## 2025-07-07 RX ADMIN — Medication 1 CAPSULE: at 08:09

## 2025-07-07 RX ADMIN — MAGNESIUM OXIDE TAB 400 MG (240 MG ELEMENTAL MG) 400 MG: 400 (240 MG) TAB at 08:08

## 2025-07-07 RX ADMIN — TRAZODONE HYDROCHLORIDE 50 MG: 50 TABLET ORAL at 21:13

## 2025-07-07 RX ADMIN — TRAMADOL HYDROCHLORIDE 50 MG: 50 TABLET, COATED ORAL at 13:00

## 2025-07-07 RX ADMIN — BUDESONIDE AND FORMOTEROL FUMARATE DIHYDRATE 2 PUFF: 160; 4.5 AEROSOL RESPIRATORY (INHALATION) at 21:09

## 2025-07-07 RX ADMIN — TRAMADOL HYDROCHLORIDE 50 MG: 50 TABLET, COATED ORAL at 21:13

## 2025-07-07 RX ADMIN — METOPROLOL TARTRATE 50 MG: 50 TABLET, FILM COATED ORAL at 21:13

## 2025-07-07 RX ADMIN — TIMOLOL MALEATE 1 DROP: 2.5 SOLUTION OPHTHALMIC at 21:14

## 2025-07-07 RX ADMIN — FOLIC ACID 1 MG: 1 TABLET ORAL at 08:08

## 2025-07-07 RX ADMIN — Medication 10 ML: at 21:15

## 2025-07-07 RX ADMIN — Medication 1000 MCG: at 08:08

## 2025-07-07 RX ADMIN — AVOBENZONE, HOMOSALATE, OCTISALATE, OCTOCRYLENE, AND OXYBENZONE 1 PACKET: 29.4; 147; 49; 25.4; 58.8 LOTION TOPICAL at 08:13

## 2025-07-07 RX ADMIN — ATORVASTATIN CALCIUM 40 MG: 20 TABLET, FILM COATED ORAL at 08:08

## 2025-07-07 RX ADMIN — PANTOPRAZOLE SODIUM 40 MG: 40 TABLET, DELAYED RELEASE ORAL at 08:08

## 2025-07-07 RX ADMIN — METOPROLOL TARTRATE 50 MG: 50 TABLET, FILM COATED ORAL at 08:08

## 2025-07-07 RX ADMIN — TIMOLOL MALEATE 1 DROP: 2.5 SOLUTION OPHTHALMIC at 08:09

## 2025-07-07 RX ADMIN — ACETAMINOPHEN 650 MG: 325 TABLET, FILM COATED ORAL at 13:00

## 2025-07-07 RX ADMIN — DULOXETINE 30 MG: 30 CAPSULE, DELAYED RELEASE ORAL at 08:08

## 2025-07-07 NOTE — DISCHARGE PLACEMENT REQUEST
Bo Walton (89 y.o. Female)       Date of Birth   10/24/1930    Social Security Number       Address   [address] [place] [id number]    Home Phone   [phone]    MRN   [id number]       [employer]   [facility]    Marital Status                               Admission Date   7/6/2025    Admission Type   Emergency    Admitting Provider   Joshua Kim MD    Attending Provider   Adam Barr MD    Department, Room/Bed   [address], [address]       Discharge Date       Discharge Disposition       Discharge Destination                                 Attending Provider: Adam Barr MD    Allergies: Bactrim [Sulfamethoxazole-trimethoprim], Doxycycline, Fosamax [Alendronate], Lactose Intolerance (Gi), Macrodantin [Nitrofurantoin], Naproxen, Zestril [Lisinopril]    Isolation: Contact   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)   Code Status: CPR    Ht: --   Wt: --    Admission Cmt: None   Principal Problem: Paroxysmal SVT (supraventricular tachycardia) [I47.10]                   Active Insurance as of 7/6/2025       Primary Coverage       Payor Plan Insurance Group Employer/Plan Group    ANTHEM MEDICARE REPLACEMENT ANTHEM MEDICARE ADVANTAGE PPO [id number]       Payor Plan Address Payor Plan Phone Number Payor Plan Fax Number Effective Dates    [address] [phone]  1/1/2024 - None Entered    [facility] [id number]         Subscriber Name Subscriber Birth Date Member ID       BO WALTON 10/24/1930 [id number]                     Emergency Contacts        (Rel.) Home Phone Work Phone Mobile Phone    MARLA CALLES (Daughter) -- -- [phone]    Chace Rader -- -- [phone]

## 2025-07-07 NOTE — NURSING NOTE
Reason for Visit: CWCN: We saw the patient at the request of the floor nurse regarding a skin issue in the Coccyx area. The patient was alert and able to turn with assistance. Upon assessment, we saw slow blanchable redness on the coccyx area,  possibly related to pressure injury stage 1, POA.  In addition two partial-thickness skin loss, consistent with skin tear were noted in left proximal lower leg and left distal thigh  The bilateral heels and ankles remain with intact skin and normal coloration.  Treatment Plan/Recommendations:  Protective padding should be used to facilitate cushioning to Sacrococcygeal and bilateral heels. Keep the heels elevated off the bed for pressure reduction.   Xeroform dressing, 4 x 4 gauze was placed towards open area in left lower extremity, and wrapped Kerlix. Xeroform dressing or Vaseline gauze can continue using every other days.  Wound Team Follow up Plan: WCN team will follow up according to her need.

## 2025-07-07 NOTE — CONSULTS
Date of Consultation: 25    Referral Provider: Dr. Barr.    Reason for Consultation: Paroxysmal SVT    Encounter Provider: Michelet Da Silva MD    Group of Service: [facility]     Patient Name: Bo Adan    :10/24/1930    Chief complaint: Diarrhea.    History of Present Illness:      Bo Adan is a 89-year-old female with a past medical history notable for cervical cancer status post radiation, irritable bowel syndrome, history of bile duct obstruction status post ERCP, history of DVT, mixed hyperlipidemia, hypertension.     She was admitted to  in  after a mechanical fall resulting in a femur fracture.  Preoperative workup included an EKG that showed inferior and septal Q waves as well as a echocardiogram that revealed EF of 48% with mild septal hypokinesis and no other WMA.  Troponin was normal x 3.  She was cleared for surgery.  At her follow-up appointment her blood pressure was elevated.  She was prescribed losartan 100 mg daily, Coreg 12.5 twice daily, and amlodipine 5 mg daily.    She was admitted to [facility] in  with diarrhea. Cardiology saw her for episodes of SVT. It was felt her arrhythmia was related to electrolyte disturbance. She was continued on Coreg and her electrolytes were replaced.    She was admitted to the hospital yesterday for further management of diarrhea.  Her caregivers at home have been giving her laxatives because she has been constipated. While she was in the emergency room she had a few episodes of asymptomatic, nonsustained SVT.  She was asymptomatic with these episodes.  She did have evidence of hypomagnesemia with a magnesium of 1.6.  Medicine started her on metoprolol 50 mg twice daily, and these episodes have resolved essentially.  She has had no chest pain, and she denied any shortness of breath and less she is up and around at home (she tries to stay active for her age).          ECHO 1/10/2022  Calculated left ventricular EF =  40% Estimated left ventricular EF was in agreement with the calculated left ventricular EF. Left ventricular systolic function is moderately decreased.  The following left ventricular wall segments are hypokinetic: mid inferolateral, mid inferior, basal inferoseptal, basal inferior and basal inferoseptal.  Left ventricular diastolic function is consistent with (grade I) impaired relaxation.  There is moderate calcification of the aortic valve.  Aortic valve maximum pressure gradient is 6.3 mmHg. Aortic valve mean pressure gradient is 3.4 mmHg.  Mild tricuspid valve regurgitation is present.  Estimated right ventricular systolic pressure from tricuspid regurgitation is normal (<35 mmHg).  There is a circumferential pericardial effusion.    Stress Test 4/21/2021  Conclusion   Interpretation based on 2D/3D.   Moderate global left ventricular hypokinesis.   Calculated left ventricular ejection fraction of 43 % (Biplane Hickey's   method).   Mild global right ventricular hypokinesis.   There is mild mitral regurgitation.   There is mild tricuspid regurgitation.               Past Medical History:   Diagnosis Date    Arthritis     Cancer     cervical CA 2018 finished radiation    Chronic systolic CHF (congestive heart failure) 01/11/2022    COVID 12/2021    Elevated cholesterol     Enteritis     GERD (gastroesophageal reflux disease)     Hypertension     Pneumonia 12/2021         Past Surgical History:   Procedure Laterality Date    CHOLECYSTECTOMY      COLONOSCOPY      ENDOSCOPY      FRACTURE SURGERY      feb 23 2021         Allergies   Allergen Reactions    Bactrim [Sulfamethoxazole-Trimethoprim] GI Intolerance    Doxycycline GI Intolerance    Fosamax [Alendronate] GI Intolerance    Lactose Intolerance (Gi) GI Intolerance    Macrodantin [Nitrofurantoin] GI Intolerance    Naproxen GI Intolerance    Zestril [Lisinopril] GI Intolerance         No current facility-administered medications on file prior to encounter.      Current Outpatient Medications on File Prior to Encounter   Medication Sig Dispense Refill    acetaminophen (TYLENOL) 500 MG tablet Take 1 tablet by mouth Every 6 (Six) Hours As Needed for Mild Pain . 30 tablet 0    albuterol sulfate  (90 Base) MCG/ACT inhaler Inhale 2 puffs Every 4 (Four) Hours As Needed for Wheezing. 6.7 g 0    amLODIPine (NORVASC) 5 MG tablet Take 5 mg by mouth Daily.      aspirin 81 MG EC tablet Take 81 mg by mouth Daily.      atorvastatin (LIPITOR) 40 MG tablet Take 40 mg by mouth Daily.      budesonide-formoterol (SYMBICORT) 160-4.5 MCG/ACT inhaler Inhale 2 puffs 2 (Two) Times a Day. 10.2 g 0    carvedilol (COREG) 25 MG tablet Take 25 mg by mouth 2 (Two) Times a Day With Meals.      dilTIAZem (CARDIZEM) 30 MG tablet Take 1 tablet by mouth Daily. (Patient taking differently: Take 1 tablet by mouth 2 (Two) Times a Day.) 30 tablet 0    DULoxetine (CYMBALTA) 20 MG capsule Take 30 mg by mouth Daily. Taken at evening       folic acid (FOLVITE) 1 MG tablet Take 1 mg by mouth Daily.      hydrocortisone (ANUSOL-HC) 25 MG suppository Insert 1 suppository into the rectum 2 (Two) Times a Day. 60 suppository 1    lactobacillus acidophilus (RISAQUAD) capsule capsule Take 1 capsule by mouth Daily. 30 capsule 0    latanoprost (XALATAN) 0.005 % ophthalmic solution 1 drop Every Night.      Melatonin 1 MG/4ML liquid 5 mg.      multivitamin with minerals tablet tablet Take 1 tablet by mouth Daily.      ondansetron ODT (ZOFRAN-ODT) 4 MG disintegrating tablet DISSOLVE 1 TABLET IN MOUTH EVERY 8 HOURS AS NEEDED FOR NAUSEA FOR 7 DAYS      pantoprazole (PROTONIX) 40 MG EC tablet Take 40 mg by mouth Daily.      timolol (TIMOPTIC) 0.25 % ophthalmic solution 1 drop 2 (Two) Times a Day.      traMADol (ULTRAM) 50 MG tablet Take 50 mg by mouth Every 8 (Eight) Hours As Needed for Moderate Pain .      traZODone (DESYREL) 50 MG tablet Take 50 mg by mouth Every Night.      vitamin B-12 (CYANOCOBALAMIN) 1000 MCG tablet  Take 1,000 mcg by mouth Daily.           Social History     Socioeconomic History    Marital status:    Tobacco Use    Smoking status: Never    Smokeless tobacco: Never   Vaping Use    Vaping status: Never Used   Substance and Sexual Activity    Alcohol use: Not Currently    Drug use: Not Currently    Sexual activity: Defer         History reviewed. No pertinent family history.    REVIEW OF SYSTEMS:   Pertinent positives are noted in HPI above.  Otherwise, all other systems were reviewed, and are negative.     Objective:     Vitals:    07/07/25 0835 07/07/25 0900 07/07/25 1100 07/07/25 1300   BP:   170/89    BP Location:   Right arm    Patient Position:   Lying    Pulse: 95  84    Resp: 18  18    Temp:   97.3 °F (36.3 °C)    TempSrc:   Oral    SpO2: 95%  93%    Weight:  40.9 kg (90 lb 1.6 oz) 43.1 kg (95 lb) 40.9 kg (90 lb 1.6 oz)     Body mass index is 17.6 kg/m².       General:    No acute distress, alert and oriented x4, pleasant, elderly and frail-appearing.                   Head:    Normocephalic, atraumatic.   Eyes:          Conjunctivae and sclerae normal, no icterus.   Throat:   No oral lesions, no thrush, oral mucosa moist.    Neck:   Supple, trachea midline.   Lungs:     Clear to auscultation bilaterally     Heart:    Regular rhythm and normal rate. II/VI SM LLSB.   Abdomen:     Soft, non-tender, non-distended, positive bowel sounds.    Extremities:   No clubbing, cyanosis, or edema.     Pulses:   Pulses palpable and equal bilaterally.    Skin:   Several ecchymotic lesions on the upper extremities.  No bleeding.   Neuro:   Non-focal.  Moves all extremities well.    Psychiatric:   Normal mood and affect.       Lab Review:                Results from last 7 days   Lab Units 07/07/25  0736   SODIUM mmol/L 136   POTASSIUM mmol/L 3.7   CHLORIDE mmol/L 102   CO2 mmol/L 23.1   BUN mg/dL 8.0   CREATININE mg/dL 0.48*   GLUCOSE mg/dL 90   CALCIUM mg/dL 9.2         Results from last 7 days   Lab Units  07/07/25  0736   WBC 10*3/mm3 5.64   HEMOGLOBIN g/dL 12.2   HEMATOCRIT % 37.9   PLATELETS 10*3/mm3 230             Results from last 7 days   Lab Units 07/07/25  0736   MAGNESIUM mg/dL 1.7           EKG (reviewed by me personally):            Assessment:   1.  Diarrhea  2.  Choledocholithiasis  3.  Paroxysmal SVT  4.  Macrocytosis without anemia  5.  History of cardiomyopathy, EF 40% in 2022  6.  Hypertension  7.  Frailty    Plan:       She has had episodes of SVT in the past.  I suspect that the current episodes are being driven by the diarrhea, low magnesium, and her clinical situation with choledocholithiasis.  She was asymptomatic when these were occurring, although there were some longer episodes noted.    The SVT has improved significantly since starting the metoprolol 50 mg twice daily.  I will keep her on this dose and see how she responds.  Her current heart rate is in the 70s, and she is in sinus rhythm.  Her magnesium is being repleted.  Her TSH was checked and is normal at 1.31.  She has a history of a reduced ejection fraction, although she does not have any signs of volume overload on exam.    Cardiology will continue to follow for now.  Thank you very much for this consult.    Romel Da Silva MD

## 2025-07-07 NOTE — CONSULTS
GI CONSULT  NOTE:    Referring Provider:  Dr. Barr    Chief complaint: Choledocholithiasis    Subjective .     History of present illness: 89-year-old white female with history of choledocholithiasis status post ERCP with stone extraction  presents with diarrhea.  She has had diarrhea for 3+ years that has worsened over the last 6 months.  She also has vague abdominal pain that is mostly in the morning after waking up in her left lower quadrant into her rectum.  She also has some migrating abdominal pain as well.  She has a history of altering bowel patterns and will have cyclic constipation and diarrhea.  She has not had a colonoscopy for 20 years.  She has had numerous workups by gastroenterologist in [facility].      Endo History:      Past Medical History:  Past Medical History:   Diagnosis Date    Arthritis     Cancer     cervical CA 2018 finished radiation    Chronic systolic CHF (congestive heart failure) 01/11/2022    COVID 12/2021    Elevated cholesterol     Enteritis     GERD (gastroesophageal reflux disease)     Hypertension     Pneumonia 12/2021       Past Surgical History:  Past Surgical History:   Procedure Laterality Date    CHOLECYSTECTOMY      COLONOSCOPY      ENDOSCOPY      FRACTURE SURGERY      feb 23 2021       Social History:  Social History     Tobacco Use    Smoking status: Never    Smokeless tobacco: Never   Vaping Use    Vaping status: Never Used   Substance Use Topics    Alcohol use: Not Currently    Drug use: Not Currently       Family History:  History reviewed. No pertinent family history.    Medications:  Medications Prior to Admission   Medication Sig Dispense Refill Last Dose/Taking    acetaminophen (TYLENOL) 500 MG tablet Take 1 tablet by mouth Every 6 (Six) Hours As Needed for Mild Pain . 30 tablet 0     albuterol sulfate  (90 Base) MCG/ACT inhaler Inhale 2 puffs Every 4 (Four) Hours As Needed for Wheezing. 6.7 g 0     amLODIPine (NORVASC) 5 MG tablet Take 5 mg by mouth Daily.        aspirin 81 MG EC tablet Take 81 mg by mouth Daily.       atorvastatin (LIPITOR) 40 MG tablet Take 40 mg by mouth Daily.       budesonide-formoterol (SYMBICORT) 160-4.5 MCG/ACT inhaler Inhale 2 puffs 2 (Two) Times a Day. 10.2 g 0     carvedilol (COREG) 25 MG tablet Take 25 mg by mouth 2 (Two) Times a Day With Meals.       dilTIAZem (CARDIZEM) 30 MG tablet Take 1 tablet by mouth Daily. (Patient taking differently: Take 1 tablet by mouth 2 (Two) Times a Day.) 30 tablet 0     DULoxetine (CYMBALTA) 20 MG capsule Take 30 mg by mouth Daily. Taken at evening        folic acid (FOLVITE) 1 MG tablet Take 1 mg by mouth Daily.       hydrocortisone (ANUSOL-HC) 25 MG suppository Insert 1 suppository into the rectum 2 (Two) Times a Day. 60 suppository 1     lactobacillus acidophilus (RISAQUAD) capsule capsule Take 1 capsule by mouth Daily. 30 capsule 0     latanoprost (XALATAN) 0.005 % ophthalmic solution 1 drop Every Night.       Melatonin 1 MG/4ML liquid 5 mg.       multivitamin with minerals tablet tablet Take 1 tablet by mouth Daily.       ondansetron ODT (ZOFRAN-ODT) 4 MG disintegrating tablet DISSOLVE 1 TABLET IN MOUTH EVERY 8 HOURS AS NEEDED FOR NAUSEA FOR 7 DAYS       pantoprazole (PROTONIX) 40 MG EC tablet Take 40 mg by mouth Daily.       timolol (TIMOPTIC) 0.25 % ophthalmic solution 1 drop 2 (Two) Times a Day.       traMADol (ULTRAM) 50 MG tablet Take 50 mg by mouth Every 8 (Eight) Hours As Needed for Moderate Pain .       traZODone (DESYREL) 50 MG tablet Take 50 mg by mouth Every Night.       vitamin B-12 (CYANOCOBALAMIN) 1000 MCG tablet Take 1,000 mcg by mouth Daily.          Scheduled Meds:aspirin, 81 mg, Oral, Daily  atorvastatin, 40 mg, Oral, Daily  budesonide-formoterol, 2 puff, Inhalation, BID - RT  DULoxetine, 30 mg, Oral, Daily  folic acid, 1 mg, Oral, Daily  lactobacillus acidophilus, 1 capsule, Oral, Daily  latanoprost, 1 drop, Both Eyes, Nightly  magnesium oxide, 400 mg, Oral, BID  metoprolol tartrate, 50  mg, Oral, Q12H  multivitamin with minerals, 1 tablet, Oral, Daily  pantoprazole, 40 mg, Oral, Daily  Psyllium, 1 packet, Oral, Daily  sodium chloride, 10 mL, Intravenous, Q12H  timolol, 1 drop, Both Eyes, Q12H  traZODone, 50 mg, Oral, Nightly  vitamin B-12, 1,000 mcg, Oral, Daily      Continuous Infusions:sodium chloride, 75 mL/hr, Last Rate: 75 mL/hr (07/06/25 1916)      PRN Meds:.  acetaminophen **OR** acetaminophen **OR** acetaminophen    albuterol    nitroglycerin    ondansetron ODT **OR** ondansetron    [COMPLETED] Insert Peripheral IV **AND** sodium chloride    sodium chloride    sodium chloride    traMADol    ALLERGIES:  Bactrim [sulfamethoxazole-trimethoprim], Doxycycline, Fosamax [alendronate], Lactose intolerance (gi), Macrodantin [nitrofurantoin], Naproxen, and Zestril [lisinopril]    ROS:  Review of Systems   Gastrointestinal:  Positive for abdominal pain, constipation and diarrhea.       Objective     Vital Signs:   Vitals:    07/07/25 0900 07/07/25 1100 07/07/25 1300 07/07/25 1606   BP:  170/89  151/90   BP Location:  Right arm  Right arm   Patient Position:  Lying  Lying   Pulse:  84  107   Resp:  18  18   Temp:  97.3 °F (36.3 °C)  98.2 °F (36.8 °C)   TempSrc:  Oral  Oral   SpO2:  93%  100%   Weight: 40.9 kg (90 lb 1.6 oz) 43.1 kg (95 lb) 40.9 kg (90 lb 1.6 oz)          Physical Exam:      General Appearance:    Awake and alert, in no acute distress   Head:    Normocephalic, without obvious abnormality, atraumatic   Eyes:            Conjunctivae normal, anicteric sclerae   Ears:    Ears appear intact with no abnormalities noted   Throat:   No oral lesions, no thrush, oral mucosa moist   Neck:   No adenopathy, supple, no thyromegaly, no JVD   Lungs:     Respirations regular, even and unlabored   Chest Wall:    No abnormalities observed   Abdomen:     Soft, nontender, no rebound or guarding, nondistended, no hepatosplenomegaly    Rectal:     Deferred   Extremities:   Moves all extremities well, no  edema, no cyanosis, no         redness   Pulses:   Pulses palpable and equal bilaterally   Skin:   No bleeding, bruising or rash, no jaundice   Lymph nodes:   No palpable adenopathy   Neurologic:   Cranial nerves 2 - 12 grossly intact, no asterixis, sensation intact       Results Review:   I reviewed the patient's labs and imaging.  Lab Results (last 24 hours)       Procedure Component Value Units Date/Time    Reticulocytes [[id number]]  (Normal) Collected: 07/07/25 0736    Specimen: Blood Updated: 07/07/25 0836     Reticulocyte % 1.81 %      Reticulocyte Absolute 0.0580 10*6/mm3     Folate [[id number]]  (Normal) Collected: 07/07/25 0736    Specimen: Blood Updated: 07/07/25 0833     Folate >20.00 ng/mL     Narrative:      Results may be falsely increased if patient taking Biotin.      Vitamin B12 [[id number]]  (Abnormal) Collected: 07/07/25 0736    Specimen: Blood Updated: 07/07/25 0833     Vitamin B-12 >2,000 pg/mL     Narrative:      Results may be falsely increased if patient taking Biotin.      Comprehensive Metabolic Panel [[id number]]  (Abnormal) Collected: 07/07/25 0736    Specimen: Blood Updated: 07/07/25 0826     Glucose 90 mg/dL      BUN 8.0 mg/dL      Creatinine 0.48 mg/dL      Sodium 136 mmol/L      Potassium 3.7 mmol/L      Chloride 102 mmol/L      CO2 23.1 mmol/L      Calcium 9.2 mg/dL      Total Protein 7.0 g/dL      Albumin 3.6 g/dL      ALT (SGPT) 8 U/L      AST (SGOT) 25 U/L      Alkaline Phosphatase 84 U/L      Total Bilirubin 0.4 mg/dL      Globulin 3.4 gm/dL      A/G Ratio 1.1 g/dL      BUN/Creatinine Ratio 16.7     Anion Gap 10.9 mmol/L      eGFR 87.9 mL/min/1.73     Narrative:      GFR Categories in Chronic Kidney Disease (CKD)              GFR Category          GFR (mL/min/1.73)    Interpretation  G1                    90 or greater        Normal or high (1)  G2                    60-89                Mild decrease (1)  G3a                   45-59                Mild to moderate decrease  G3b                    30-44                Moderate to severe decrease  G4                    15-29                Severe decrease  G5                    14 or less           Kidney failure    (1)In the absence of evidence of kidney disease, neither GFR category G1 or G2 fulfill the criteria for CKD.    eGFR calculation 2021 CKD-EPI creatinine equation, which does not include race as a factor    Iron Profile w/o Ferritin [[id number]]  (Abnormal) Collected: 07/07/25 0736    Specimen: Blood Updated: 07/07/25 0826     Iron 63 mcg/dL      Iron Saturation (TSAT) 19 %      Transferrin 228 mg/dL      TIBC 340 mcg/dL     Magnesium [[id number]]  (Normal) Collected: 07/07/25 0736    Specimen: Blood Updated: 07/07/25 0826     Magnesium 1.7 mg/dL     Phosphorus [[id number]]  (Normal) Collected: 07/07/25 0736    Specimen: Blood Updated: 07/07/25 0826     Phosphorus 3.4 mg/dL     CBC Auto Differential [[id number]]  (Abnormal) Collected: 07/07/25 0736    Specimen: Blood Updated: 07/07/25 0814     WBC 5.64 10*3/mm3      RBC 3.66 10*6/mm3      Hemoglobin 12.2 g/dL      Hematocrit 37.9 %      .6 fL      MCH 33.3 pg      MCHC 32.2 g/dL      RDW 12.6 %      RDW-SD 47.4 fl      MPV 9.5 fL      Platelets 230 10*3/mm3      Neutrophil % 75.5 %      Lymphocyte % 13.7 %      Monocyte % 9.2 %      Eosinophil % 0.7 %      Basophil % 0.4 %      Immature Grans % 0.5 %      Neutrophils, Absolute 4.26 10*3/mm3      Lymphocytes, Absolute 0.77 10*3/mm3      Monocytes, Absolute 0.52 10*3/mm3      Eosinophils, Absolute 0.04 10*3/mm3      Basophils, Absolute 0.02 10*3/mm3      Immature Grans, Absolute 0.03 10*3/mm3      nRBC 0.0 /100 WBC             Imaging Results (Last 24 Hours)       Procedure Component Value Units Date/Time    CT Abdomen Pelvis Without Contrast [[id number]] Collected: 07/06/25 1424     Updated: 07/06/25 2258    Narrative:      CT ABDOMEN AND PELVIS WITHOUT IV CONTRAST     HISTORY: Diarrhea, abdominal cramping     TECHNIQUE: Radiation  dose reduction techniques were utilized, including  automated exposure control and exposure modulation based on body size.   3 mm images were obtained through the abdomen and pelvis without IV  contrast.     COMPARISON: CT abdomen pelvis 1/8/2022       Impression:      FINDINGS AND IMPRESSION:  THERE IS RETICULONODULAR AND GROUNDGLASS OPACIFICATION WITHIN THE  BILATERAL LUNG BASES, ASYMMETRIC PULM OPACIFICATION IN THE MEDIAL ASPECT  OF THE RIGHT LOWER LUNG IS PRESENT, ALSO IN PART SEEN IN 2022. HOWEVER,  THE FINDINGS WITHIN THE REMAINDER OF THE LUNGS APPEARS TO HAVE WORSENED.  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  CORRELATION WITH PATIENT HISTORY RECOMMENDED TO EXCLUDE ATYPICAL  PNEUMONIA. ADDITIONALLY, FOLLOW-UP OF THE PULMONARY NODULARITY WITHIN  THE RIGHT MIDDLE LOBE MEASURING UP TO APPROXIMATE 0.6 CM WITH CHEST CT  IN 6 MONTHS IS RECOMMENDED TO ENSURE STABILITY.     No free intraperitoneal air is seen.     THERE IS SOFT TISSUE THICKENING OVERLYING THE DISTAL COCCYX AND SACRUM  AND CORRELATION WITH PATIENT HISTORY RECOMMENDED TO EXCLUDE DECUBITUS  ULCER. WHILE NO DISCRETE OSTEOLYSIS IS SEEN, OSTEOMYELITIS CANNOT BE  EXCLUDED AND IF THERE IS CLINICAL CONCERN, PLAN IS TO BE FURTHER  EVALUATED WITH MRI OF THE PELVIS WITH AND WITHOUT CONTRAST IF CLINICALLY  INDICATED.     Small amount free fluid is present in pelvis, nonspecific. The appendix  is unremarkable. Air-fluid levels are present within the large and small  bowel which is otherwise nondistended. No abdominopelvic adenopathy by  size criteria.     Streak artifact from left femoral hardware which is only partially seen  and cannot be evaluated, limits evaluation of the pelvis. Findings of  sacral insufficiency fractures, as before, with interval healing.     MODERATE TO SEVERE INTRAHEPATIC BILI DUCT DILATION IS PRESENT STATUS  POST CHOLECYSTECTOMY, AS  BEFORE. THERE IS PNEUMOBILIA.. THERE APPEAR TO  BE 2 DISCRETE HYPERDENSE NODULES WITHIN THE DISTAL COMMON BILE DUCT IN  KEEPING WITH CHOLEDOCHOLITHIASIS. FURTHER EVALUATION WITH ERCP IS  RECOMMENDED. FINDINGS WERE DISCUSSED WITH DR. PEDRAZA   BY TELEPHONE.     Subcentimeter renal lesions are too small to characterize. Larger  hypodense lesions demonstrate density less than 15 Hounsfield units is  likely benign Bosniak 2019 criteria. No hydronephrosis or visualized  renal calculus. The bladder is moderately distended.     The spleen, pancreas and adrenal glands have an unremarkable noncontrast  CT appearance advanced degenerative changes are present throughout the  visualized spine can be further evaluated with MRI if clinically  indicated. For the purposes of this dictation, the last well-formed disc  space referred to as L5-S1. Compression deformity of the L5 vertebral  body is present, as before.                       This report was finalized on 7/6/2025 10:55 PM by Dr. Frandy Camilo M.D  on Workstation: [id number]                  ASSESSMENT:  Choledocholithiasis  Diarrhea  Constipation  Rectal pain  Abnormal CT scan of the distal coccyx    Principal Problem:    Paroxysmal SVT (supraventricular tachycardia)       PLAN:  -Normal LFTs and no right upper quadrant pain.  No plan for ERCP.  This is likely just reflux to food from previous ERCP and sphincterotomy  - Could consider stopping SNRI, PPI, NSAID to see if bowel habits improve.  She certainly could have a component of collagenous colitis or microscopic colitis from these.  She would not be a good candidate for colonoscopy given age and comorbidities so empiric treatment and monitoring symptoms would be appropriate.  - Could consider budesonide 9 mg daily or colestipol 1 g twice daily to empirically treat for microscopic colitis   -Can get fecal calprotectin while in the hospital to determine if this diarrhea is inflammatory  -Workup of abnormal CT scan of  distal coccyx per primary  - Can be discharged from a GI standpoint-no intervention required for choledocholithiasis  We will sign off, call with questions    I discussed the patient's findings and my recommendations with the patient.  Calvin Guthrie MD  07/07/25  17:42 EDT

## 2025-07-07 NOTE — PLAN OF CARE
Goal Outcome Evaluation:  Plan of Care Reviewed With: patient           Outcome Evaluation: Pt is a 89 y.o female admitted to [facility] on 7/6 with diarrhea with hx of IBS. Pt with frequent diarrhea today but able to get up and engage in ADLs with CGA with walker. Pt c/o of feeling weaker than her baseline. OT to f/u as she remains admitted to address generalized weakness, slight unsteadiness with standing ADLs, safety with functional mobility. Recommend home with family and has good support via caregiver assist.    Anticipated Discharge Disposition (OT): home with assist

## 2025-07-07 NOTE — PLAN OF CARE
Goal Outcome Evaluation:  Plan of Care Reviewed With: patient           Outcome Evaluation: Pt seen for PT amando this AM. She is a 89 y.o. female admitted to [facility] on 7/6/25 w diarrhea w hx of IBS. At baseline, pt lives at home w her  and uses rollator for ambulation. She does have caregivers in the mornings to assist w ADLs as needed. Today, pt agreeable to therapy. SHe presents w generalizsed weakness, slightly impaired balance, and overall decreased functional mobility. Pt able to perform bed mobility w SBA. SHe stood w CGA using Rwx and was able to ambulate approx 75 ft w CGA. SHe demos slow pace. No overt unsteadiness. Pt returned to bed at end of session. She plans home upon DC w assist of caregivers as needed. Pt will continue to benefit from skilled PT to ensure safety and maximize independence w mobility.    Anticipated Discharge Disposition (PT): home with assist, home with home health

## 2025-07-07 NOTE — THERAPY EVALUATION
Patient Name: Bo Adan  : 10/24/1930    MRN: [id number]                              Today's Date: 2025       Admit Date: 2025    Visit Dx:     ICD-10-CM ICD-9-CM   1. Paroxysmal SVT (supraventricular tachycardia)  I47.10 427.0   2. Diarrhea, unspecified type  R19.7 787.91   3. Choledocholithiasis  K80.50 574.50     Patient Active Problem List   Diagnosis    Anemia    Diarrhea of presumed infectious origin    Essential hypertension    GERD without esophagitis    History of cervical cancer    History of pancreatitis    HLD (hyperlipidemia)    Chest pain    Diarrhea    Enteritis    Hypokalemia    Hypomagnesemia    Occult GI bleeding    Pneumonia of right lower lobe due to methicillin resistant Staphylococcus aureus (MRSA)    Chronic systolic CHF (congestive heart failure)    Severe malnutrition    Paroxysmal SVT (supraventricular tachycardia)     Past Medical History:   Diagnosis Date    Arthritis     Cancer     cervical CA 2018 finished radiation    Chronic systolic CHF (congestive heart failure) 2022    COVID 2021    Elevated cholesterol     Enteritis     GERD (gastroesophageal reflux disease)     Hypertension     Pneumonia 2021     Past Surgical History:   Procedure Laterality Date    CHOLECYSTECTOMY      COLONOSCOPY      ENDOSCOPY      FRACTURE SURGERY      2021      General Information       Row Name 25 1001          OT Time and Intention    Document Type evaluation  -SM     Mode of Treatment occupational therapy;co-treatment  -SM     Patient Effort good  -SM       Row Name 25 1001          General Information    Patient Profile Reviewed yes  -SM     Prior Level of Function --  has caregiver to assist her with ADLs (dressing, bathing) takes herself to bathroom and walks independently. Has a caregiver 4 hours in the morning.  -SM     Existing Precautions/Restrictions fall  -SM       Row Name 25 1001          Occupational Profile    Environmental Supports and  Barriers (Occupational Profile) Pt uses a rollator  -[facility] Name 07/07/25 1001          Living Environment    Current Living Arrangements home  -     People in Home spouse  -[facility] Name 07/07/25 1001          Cognition    Orientation Status (Cognition) oriented x 4  -[facility] Name 07/07/25 1001          Safety Issues/Impairments Affecting Functional Mobility    Impairments Affecting Function (Mobility) strength;balance;endurance/activity tolerance  -               User Key  (r) = Recorded By, (t) = Taken By, (c) = Cosigned By      Initials Name Provider Type     Christi Garg, OTR/L, CSRS Occupational Therapist                     Mobility/ADL's       Row Name 07/07/25 1003          Bed Mobility    Bed Mobility supine-sit;sit-supine  -     Supine-Sit [place] (Bed Mobility) standby assist  -     Sit-Supine [place] (Bed Mobility) standby assist  -     Assistive Device (Bed Mobility) head of bed elevated;bed rails  -[facility] Name 07/07/25 1003          Transfers    Transfers sit-stand transfer;toilet transfer  -[facility] Name 07/07/25 1003          Sit-Stand Transfer    Sit-Stand [place] (Transfers) contact guard  [facility]     Assistive Device (Sit-Stand Transfers) walker, front-wheeled  -[facility] Name 07/07/25 1003          Toilet Transfer    [place] Level (Toilet Transfer) contact guard  [facility]     Assistive Device (Toilet Transfer) commode, bedside without drop arms;walker, front-wheeled  -[facility] Name 07/07/25 1003          Functional Mobility    Functional Mobility- Ind. Level contact guard assist  -     Functional Mobility- Device walker, front-wheeled  [facility]     Functional Mobility- Comment demonstrates distance needed for ADL engagement today in room.  -       Row Name 07/07/25 1003          Activities of Daily Living    BADL Assessment/Intervention lower body dressing;toileting;grooming  -[facility] Name 07/07/25 1003          Lower Body Dressing  Assessment/Training    Comment, (Lower Body Dressing) not tested 2/2 diarrhea episodes ongoing during eval. Has caregivers who help with dressing at baseline.  -SM       Row Name 07/07/25 1003          Toileting Assessment/Training    [place] Level (Toileting) contact guard assist  -SM       Row Name 07/07/25 1003          Grooming Assessment/Training    [place] Level (Grooming) wash face, hands;contact guard assist  -SM     Position (Grooming) sink side;supported standing  -               User Key  (r) = Recorded By, (t) = Taken By, (c) = Cosigned By      Initials Name Provider Type     Christi Garg OTR/L, CSRS Occupational Therapist                   Obj/Interventions       Row Name 07/07/25 1005          Range of Motion Comprehensive    General Range of Motion bilateral upper extremity ROM WNL  -[facility] Name 07/07/25 1005          Strength Comprehensive (MMT)    Comment, General Manual Muscle Testing (MMT) Assessment pt c/o of generalized weakness, BUE MMT 4-/5  -[facility] Name 07/07/25 1005          Motor Skills    Motor Skills functional endurance  -     Functional Endurance fair  -[facility] Name 07/07/25 1005          Balance    Balance Assessment sitting static balance;standing static balance;standing dynamic balance  -     Static Sitting Balance supervision  -     Position, Sitting Balance unsupported  -     Static Standing Balance contact guard  -SM     Dynamic Standing Balance contact guard  -SM     Position/Device Used, Standing Balance supported;walker, rolling  -               User Key  (r) = Recorded By, (t) = Taken By, (c) = Cosigned By      Initials Name Provider Type     Christi Garg OTR/L, CSRS Occupational Therapist                   Goals/Plan       Row Name 07/07/25 1149          Transfer Goal 1 (OT)    Activity/Assistive Device (Transfer Goal 1, OT) toilet;walk-in shower  -     [place] Level/Cues Needed (Transfer Goal 1, OT) supervision  required  -SM     Time Frame (Transfer Goal 1, OT) short term goal (STG);3 weeks  -SM     Progress/Outcome (Transfer Goal 1, OT) goal ongoing  -SM       Row Name 07/07/25 1149          Toileting Goal 1 (OT)    Activity/Device (Toileting Goal 1, OT) toileting skills, all  -SM     [place] Level/Cues Needed (Toileting Goal 1, OT) supervision required  -SM     Time Frame (Toileting Goal 1, OT) short term goal (STG);2 weeks  -SM     Progress/Outcome (Toileting Goal 1, OT) goal ongoing  -SM       Row Name 07/07/25 1149          Grooming Goal 1 (OT)    Activity/Device (Grooming Goal 1, OT) grooming skills, all  -SM     [place] (Grooming Goal 1, OT) supervision required  -SM     Time Frame (Grooming Goal 1, OT) short term goal (STG);2 weeks  -SM     Strategies/Barriers (Grooming Goal 1, OT) standing at sink  -SM     Progress/Outcome (Grooming Goal 1, OT) goal ongoing  -       Row Name 07/07/25 1149          Therapy Assessment/Plan (OT)    Planned Therapy Interventions (OT) activity tolerance training;adaptive equipment training;BADL retraining;functional balance retraining;IADL retraining;occupation/activity based interventions;patient/caregiver education/training;ROM/therapeutic exercise;strengthening exercise;transfer/mobility retraining  -               User Key  (r) = Recorded By, (t) = Taken By, (c) = Cosigned By      Initials Name Provider Type    Christi Matos, OTR/L, CSRS Occupational Therapist                   Clinical Impression       Row Name 07/07/25 1006          Pain Assessment    Pretreatment Pain Rating 0/10 - no pain  -SM     Posttreatment Pain Rating 0/10 - no pain  -SM       Row Name 07/07/25 1006          Plan of Care Review    Plan of Care Reviewed With patient  -SM     Outcome Evaluation Pt is a 89 y.o female admitted to [facility] on 7/6 with diarrhea with hx of IBS. Pt with frequent diarrhea today but able to get up and engage in ADLs with CGA with walker. Pt c/o of feeling weaker than  her baseline. OT to f/u as she remains admitted to address generalized weakness, slight unsteadiness with standing ADLs, safety with functional mobility. Recommend home with family and has good support via caregiver assist.  -       Row Name 07/07/25 1006          Therapy Assessment/Plan (OT)    Rehab Potential (OT) good  -SM     Therapy Frequency (OT) 3 times/wk  -       Row Name 07/07/25 1006          Therapy Plan Review/Discharge Plan (OT)    Anticipated Discharge Disposition (OT) home with assist  -       Row Name 07/07/25 1006          Positioning and Restraints    Pre-Treatment Position in bed  -SM     Post Treatment Position chair  -SM     In Chair reclined;call light within reach;encouraged to call for assist;exit alarm on;with family/caregiver;notified [facility]  -               User Key  (r) = Recorded By, (t) = Taken By, (c) = Cosigned By      Initials Name Provider Type    Christi Matos, OTR/L, CSRS Occupational Therapist                   Outcome Measures       Row Name 07/07/25 1149          How much help from another is currently needed...    Putting on and taking off regular lower body clothing? 3  -SM     Bathing (including washing, rinsing, and drying) 3  -SM     Toileting (which includes using toilet bed pan or urinal) 3  -SM     Putting on and taking off regular upper body clothing 3  -SM     Taking care of personal grooming (such as brushing teeth) 3  -SM     Eating meals 4  -SM     AM-PAC 6 Clicks Score (OT) 19  -SM       Row Name 07/07/25 1037 07/07/25 0050       How much help from another person do you currently need...    Turning from your back to your side while in flat bed without using bedrails? 4  -EJ 3  -MD    Moving from lying on back to sitting on the side of a flat bed without bedrails? 3  -EJ 3  -MD    Moving to and from a bed to a chair (including a wheelchair)? 3  -EJ 2  -MD    Standing up from a chair using your arms (e.g., wheelchair, bedside chair)? 3  -EJ 2  -MD     Climbing 3-5 steps with a railing? 3  -EJ 2  -MD    To walk in hospital room? 3  -EJ 2  -MD    AM-PAC 6 Clicks Score (PT) 19  -EJ 14  -MD      Row Name 07/07/25 1149          Functional Assessment    Outcome Measure Options AM-PAC 6 Clicks Daily Activity (OT)  -               User Key  (r) = Recorded By, (t) = Taken By, (c) = Cosigned By      Initials Name Provider Type    Ludy Gallo, RN Registered Nurse    Radha Encarnacion, PT Physical Therapist    Christi Matos, OTR/L, CSRS Occupational Therapist                    Occupational Therapy Education       Title: PT OT SLP Therapies (In Progress)       Topic: Occupational Therapy (In Progress)       Point: ADL training (Done)       Learning Progress Summary            Patient Acceptance, E, VU by  at 7/7/2025 1150    Comment: OT goals, POC                      Point: Precautions (Done)       Learning Progress Summary            Patient Acceptance, E, VU by  at 7/7/2025 1150    Comment: OT goals, POC                                      User Key       Initials Effective Dates Name Provider Type Discipline     04/24/25 -  Christi Garg, OTR/L, CSRS Occupational Therapist OT                  OT Recommendation and Plan  Planned Therapy Interventions (OT): activity tolerance training, adaptive equipment training, BADL retraining, functional balance retraining, IADL retraining, occupation/activity based interventions, patient/caregiver education/training, ROM/therapeutic exercise, strengthening exercise, transfer/mobility retraining  Therapy Frequency (OT): 3 times/wk  Plan of Care Review  Plan of Care Reviewed With: patient  Outcome Evaluation: Pt is a 89 y.o female admitted to [facility] on 7/6 with diarrhea with hx of IBS. Pt with frequent diarrhea today but able to get up and engage in ADLs with CGA with walker. Pt c/o of feeling weaker than her baseline. OT to f/u as she remains admitted to address generalized weakness, slight unsteadiness  with standing ADLs, safety with functional mobility. Recommend home with family and has good support via caregiver assist.     Time Calculation:   Evaluation Complexity (OT)  Review Occupational Profile/Medical/Therapy History Complexity: expanded/moderate complexity  Assessment, Occupational Performance/Identification of Deficit Complexity: 3-5 performance deficits  Clinical Decision Making Complexity (OT): detailed assessment/moderate complexity  Overall Complexity of Evaluation (OT): moderate complexity     Time Calculation- OT       Row Name 07/07/25 1152             Time Calculation- OT    OT Start Time 0917  -SM      OT Stop Time 0938  -SM      OT Time Calculation (min) 21 min  -SM      Total Timed Code Minutes- OT 8 minute(s)  -SM      OT Received On 07/07/25  -      OT - Next Appointment 07/09/25  -SM      OT Goal Re-Cert Due Date 07/21/25  -         Timed Charges    18798 - OT Self Care/Mgmt Minutes 8  -SM         Total Minutes    Timed Charges Total Minutes 8  -SM       Total Minutes 8  -SM                User Key  (r) = Recorded By, (t) = Taken By, (c) = Cosigned By      Initials Name Provider Type     Christi Garg, OTR/L, CSRS Occupational Therapist                  Therapy Charges for Today       Code Description Service Date Service Provider Modifiers Qty    57459682651 HC OT SELF CARE/MGMT/TRAIN EA 15 MIN 7/7/2025 Christi Garg OTR/L, CSRS GO 1    60975195106 HC OT EVAL MOD COMPLEXITY 2 7/7/2025 Christi Garg OTR/L, CSRS GO 1                 Christi Garg OTMARIFER/L, CSRS  7/7/2025

## 2025-07-07 NOTE — CASE MANAGEMENT/SOCIAL WORK
Discharge Planning Assessment  [facility]     Patient Name: Bo Adan  MRN: [id number]  Today's Date: 7/7/2025    Admit Date: 7/6/2025    Plan: Home with spouse and part time caregiers.  Referral to [facility].   Discharge Needs Assessment       Row Name 07/07/25 0859       Living Environment    People in Home spouse    Current Living Arrangements home    Potentially Unsafe Housing Conditions none    In the past 12 months has the electric, gas, oil, or water company threatened to shut off services in your home? No    Primary Care Provided by child(nikko);spouse/significant other;homecare agency    Provides Primary Care For no one, unable/limited ability to care for self    Family Caregiver if Needed spouse;child(nikko), adult    Quality of Family Relationships involved;helpful;supportive    Able to Return to Prior Arrangements yes       Resource/Environmental Concerns    Resource/Environmental Concerns none    Transportation Concerns none       Transportation Needs    In the past 12 months, has lack of transportation kept you from medical appointments or from getting medications? no    In the past 12 months, has lack of transportation kept you from meetings, work, or from getting things needed for daily living? No       Food Insecurity    Within the past 12 months, you worried that your food would run out before you got the money to buy more. Never true    Within the past 12 months, the food you bought just didn't last and you didn't have money to get more. Never true       Transition Planning    Patient/Family Anticipates Transition to home with help/services    Patient/Family Anticipated Services at Transition home health care    Transportation Anticipated family or friend will provide       Discharge Needs Assessment    Readmission Within the Last 30 Days no previous admission in last 30 days    Current Outpatient/Agency/Support Group homecare agency    Equipment Currently Used at Home rollator;shower  chair;pulse ox    Concerns to be Addressed denies needs/concerns at this time    Do you want help finding or keeping work or a job? I do not need or want help    Do you want help with school or training? For example, starting or completing job training or getting a high school diploma, GED or equivalent No    Anticipated Changes Related to Illness none    Equipment Needed After Discharge none    Outpatient/Agency/Support Group Needs homecare agency    Discharge Facility/Level of Care Needs home with home health    Provided Post Acute Provider List? N/A    Provided Post Acute Provider Quality & Resource List? N/A                   Discharge Plan       Row Name 07/07/25 0901       Plan    Plan Home with spouse and part time caregiers.  Referral to [facility] .    Plan Comments S/w pt and her dtr Mary at bedside.  Facesheet info confirmed.  Pt lives in a single story house w/ her spouse.  They have a caregiver daily from [phone].   Their children live locally and check on them often.  Home DME includes a rollator and shower chair.   Pt does not drive - her family or caregivers provide transport.  Pt has used [facility] in the past and would like to use them again.  Referral sent to [facility]  Pt has been to [facility] for [facility]hab in the past.  Pt plans to return home upon DC with possible  HH.  CCP will continue to follow. ............Janice CISNEROS/ CCP                  Continued Care and Services - Admitted Since 7/6/2025       Home Medical Care       Service Provider Request Status Services Address Phone Fax Patient Preferred    [facility] Pending - Request Sent -- [id number] [place] [address] [phone] --                     Demographic Summary       Row Name 07/07/25 0835       General Information    Admission Type observation    Arrived From home    Required Notices Provided Observation Status Notice    Referral Source admission list     Reason for Consult discharge planning    Preferred Language English                   Functional Status       Row Name 07/07/25 0858       Functional Status    Usual Activity Tolerance moderate       Functional Status, IADL    Medications assistive person    Meal Preparation assistive person    Housekeeping assistive person    Laundry assistive person    Shopping assistive person    If for any reason you need help with day-to-day activities such as bathing, preparing meals, shopping, managing finances, etc., do you get the help you need? I get all the help I need       Mental Status    General Appearance WDL WDL       Mental Status Summary    Recent Changes in Mental Status/Cognitive Functioning no changes       Employment/    Employment Status retired                               Janice Rogers RN

## 2025-07-07 NOTE — CONSULTS
Patient Name: Bo Adan  YOB: 1930  MRN: [id number]  Admission date: 7/6/2025  Reason for Encounter: MD Consult  and Malnutrition Severity Assessment    Recommend:  Boost Breeze TID (Provides 750 kcals, 42 g protein if consumed)    Power Oatmeal at breakfast daily (provides 165 kcals, 11 g protein in consumed)  Change to daily stool softener such as Colace/pericolace instead of alternating between laxative and anti-diarrheal to hopefully better regulate her bowel regimen. .    Based on ASPEN/AND Criteria, patient meets nutrition diagnosis of Severe Malnutrition  of Chronic Disease or Illness due to inadequate po intake, weight loss of 10 lbs (10%) over 6 month(s), and severe fat/muscle wasting noted on NFPE.     [facility] Clinical Nutrition Assessment     Subjective    Subjective Information     89 yo female with h/o IBS with cyclic constipation and diarrhea here with diarrhea. Pt had been constipated x 5 days prior to admit and had taken gummy laxative as well as Mag citrate and now has diarrhea.     7/7: Spoke with pt and pt's daughter in room who stated pt has decreased appetite and po intake. Stated pt is lactose intolerant. Pt has issue with constipation and then takes laxative which results in diarrhea and then takes Imodium which causes this constant cycle. Pt disliked lunch tray today, obtained food preferences and ordered her a new tray with chicken salad sandwich and fruit cup. Pt agreed to try Power oatmeal at breakfast and Boost Breeze TID.      Assessment    H&P and Current Problems      H&P  Past Medical History:   Diagnosis Date    Arthritis     Cancer     cervical CA 2018 finished radiation    Chronic systolic CHF (congestive heart failure) 01/11/2022    COVID 12/2021    Elevated cholesterol     Enteritis     GERD (gastroesophageal reflux disease)     Hypertension     Pneumonia 12/2021      Past Surgical History:   Procedure Laterality Date    CHOLECYSTECTOMY      COLONOSCOPY  "     ENDOSCOPY      FRACTURE SURGERY      feb 23 2021      Current Problems   Admission Diagnosis:  Choledocholithiasis [K80.50]  Paroxysmal SVT (supraventricular tachycardia) [I47.10]  Diarrhea, unspecified type [R19.7]    Problem List:    Paroxysmal SVT (supraventricular tachycardia)      Other Applicable Nutrition Information:   Spoke with pt and pt's daughter in room who stated pt has decreased appetite and po intake. Stated pt is lactose intolerant. Pt has issue with constipation and then takes laxative which results in diarrhea and then takes Imodium which causes this constant cycle.      Anthropometrics      BMI, Height, Weight Estimated body mass index is 17.6 kg/m² as calculated from the following:    Height as of 11/11/22: 152.4 cm (60\").    Weight as of this encounter: 40.9 kg (90 lb 1.6 oz).    Weight Method: Standing scale       Trending Weight Changes Loss  weight loss of 10 lbs (10%) over 6 month(s)    Significant?  Yes       Weight History  Wt Readings from Last 10 Encounters:   07/07/25 40.9 kg (90 lb 1.6 oz)   11/11/22 43.6 kg (96 lb 3.2 oz)   08/23/22 42.6 kg (94 lb)   03/02/22 38.7 kg (85 lb 6.4 oz)   02/16/22 40.8 kg (90 lb)   01/31/22 38.6 kg (85 lb)   01/27/22 38.6 kg (85 lb)   01/26/22 38.6 kg (85 lb)   01/11/22 37.2 kg (82 lb 0.2 oz)   11/24/21 40.8 kg (90 lb)            Labs      Comment: reviewed     Results from last 7 days   Lab Units 07/07/25  0736 07/06/25  1234   SODIUM mmol/L 136 133*   POTASSIUM mmol/L 3.7 4.2   GLUCOSE mg/dL 90 100*   BUN mg/dL 8.0 9.0   CREATININE mg/dL 0.48* 0.57   CALCIUM mg/dL 9.2 9.6   PHOSPHORUS mg/dL 3.4  --    MAGNESIUM mg/dL 1.7 1.6*   ALBUMIN g/dL 3.6 3.8   BILIRUBIN mg/dL 0.4 0.3   ALK PHOS U/L 84 89   AST (SGOT) U/L 25 23   ALT (SGPT) U/L 8 9     Results from last 7 days   Lab Units 07/07/25  0736 07/06/25  1234   PLATELETS 10*3/mm3 230 231   HEMOGLOBIN g/dL 12.2 11.9*   HEMATOCRIT % 37.9 38.2   IRON mcg/dL 63  --      No results found for: \"HGBA1C\"     "   Medications       Scheduled Medications aspirin, 81 mg, Oral, Daily  atorvastatin, 40 mg, Oral, Daily  budesonide-formoterol, 2 puff, Inhalation, BID - RT  DULoxetine, 30 mg, Oral, Daily  folic acid, 1 mg, Oral, Daily  lactobacillus acidophilus, 1 capsule, Oral, Daily  latanoprost, 1 drop, Both Eyes, Nightly  magnesium oxide, 400 mg, Oral, BID  metoprolol tartrate, 50 mg, Oral, Q12H  multivitamin with minerals, 1 tablet, Oral, Daily  pantoprazole, 40 mg, Oral, Daily  Psyllium, 1 packet, Oral, Daily  sodium chloride, 10 mL, Intravenous, Q12H  timolol, 1 drop, Both Eyes, Q12H  traZODone, 50 mg, Oral, Nightly  vitamin B-12, 1,000 mcg, Oral, Daily        Infusions sodium chloride, 75 mL/hr, Last Rate: 75 mL/hr (07/06/25 1916)        PRN Medications   acetaminophen **OR** acetaminophen **OR** acetaminophen    albuterol    nitroglycerin    ondansetron ODT **OR** ondansetron    [COMPLETED] Insert Peripheral IV **AND** sodium chloride    sodium chloride    sodium chloride    traMADol     Physical Findings      Chewing/Swallowing    Needs soft to chew foods   Dentition Mouth/Teeth WDL: .WDL except, teeth   Teeth Symptoms: dental appliance present   Edema   no edema    Gastrointestinal diarrhea, hyperactive bowel sounds, last bowel movement: 7/7 x 2   Skin bruising, skin tear    Lines/Drains none   I/O reviewed      Nutrition Focused Physical Exam     NFPE See Malnutrition Severity Assessment, Date Completed: 7/7 07/07/25 Patient meets criteria for malnutrition diagnosis, see MSA note.      Malnutrition Severity Assessment      Patient meets criteria for : Severe Malnutrition  Malnutrition Type (Last 8 Hours)       Malnutrition Severity Assessment       Row Name 07/07/25 1245       Malnutrition Severity Assessment    Malnutrition Type Chronic Disease - Related Malnutrition      Row Name 07/07/25 1245       Insufficient Energy Intake     Insufficient Energy Intake Findings Severe    Insufficient Energy Intake  <75% of est.  energy requirement for > or equal to 1 month      Row Name 07/07/25 1245       Unintentional Weight Loss     Unintentional Weight Loss Findings Severe    Unintentional Weight Loss  Weight loss of 10% in six months  10 lb (10%) wt loss x 6 months      Row Name 07/07/25 1245       Muscle Loss    Loss of Muscle Mass Findings Severe    [place] Region Severe - deep hollowing/scooping, lack of muscle to touch, facial bones well defined    Clavicle Bone Region Severe - protruding prominent bone    Acromion Bone Region Severe - squared shoulders, bones, and acromion process protrusion prominent    Scapular Bone Region Severe - prominent bones, depressions easily visible between ribs, scapula, spine, shoulders    Dorsal Hand Region Severe - prominent depression    Patellar Region Severe - prominent bone, square looking, very little muscle definition    Anterior Thigh Region Severe - line/depression along thigh, obviously thin    Posterior Calf Region Severe - thin with very little definition/firmness      Row Name 07/07/25 1245       Fat Loss    Subcutaneous Fat Loss Findings Severe    Orbital Region  Severe - pronounced hollowness/depression, dark circles, loose saggy skin    Upper Arm Region Severe - mostly skin, very little space between folds, fingers touch      Row Name 07/07/25 1245       Criteria Met (Must meet criteria for severity in at least 2 of these categories: M Wasting, Fat Loss, Fluid, Secondary Signs, Wt. Status, Intake)    Patient meets criteria for  Severe Malnutrition                     Estimated Needs      Estimated Requirements         Weight used  40.9 kg (standing scale)    Calories  3745-3264 (30-35 kcal/kg)    Protein  49-61 g (1.2 - 1.5 gm/kg)    Fluid   (1 mL/kcal)     Current Nutrition Orders & Evaluation of Intake      Oral Nutrition     Food Allergies  and Intolerances Dairy/milk intolerant   Current PO Diet Diet: Regular/House; Texture: Soft to Chew (NDD 3); Soft to Chew: Whole Meat; Fluid  Consistency: Thin (IDDSI 0)   Oral Nutrition Supplement None     Trending % PO Intake Disliked lunch tray and ordered new tray, pt eating now       Assessment & Plan   Nutrition Diagnosis and Goals       Nutrition Diagnosis Problem: Malnutrition (severe)  Etiology: Factors Affecting Nutrition - JUAN, diarrhea   Signs/Symptoms: Report of Minimal PO Intake, NFPE Results, BMI, and Unintended Weight Change        Goal(s) Establish PO Intake, Average Meal Intake at Least 50%, Meets Estimated Needs , Accepts Oral Nutrition Supplement, Accepts Fortified Food, and Goals of Care are Established     Nutrition Intervention and Prescription       Intervention  Start oral nutrition supplement, Obtain food preferences, Advised alternate menu selections, Start fortified food, Continue to monitor for plan of care, and Completed NFPE      Diet Prescription     Supplement Prescription Boost Breeze TID (Provides 750 kcals, 42 g protein if consumed)   Power Oatmeal daily at breakfast (provides 165 kcals, 11 g protein in consumed)    Education Provided  Would recommend daily stool softener such as Colace/pericolace instead of alternating between laxative and anti-diarrheal to hopefully better regulate her bowel regimen.      Enteral Prescription        TPN Prescription      Monitoring/Evaluation       Monitor/Evaluation Per Protocol      Electronically signed by:  Rima Valencia RD  07/07/25 13:01 EDT

## 2025-07-07 NOTE — PROGRESS NOTES
Name: Bo Adan ADMIT: 2025   : 10/24/1930  PCP: Hernan Cruz MD    MRN: [id number] LOS: 0 days   AGE/SEX: 89 y.o. female  ROOM: [facility]     Subjective     Examined at bedside. She is anxious for discharge today. HR improved with PO metoprolol  Objective   Objective   Vital Signs  Temp:  [97.3 °F (36.3 °C)-98.4 °F (36.9 °C)] 97.3 °F (36.3 °C)  Heart Rate:  [] 84  Resp:  [16-18] 18  BP: (134-171)/() 170/89  SpO2:  [93 %-98 %] 93 %  on   ;   Device (Oxygen Therapy): room air  Body mass index is 18.55 kg/m².  Physical Exam  Constitutional:       General: She is not in acute distress.     Appearance: She is ill-appearing.   HENT:      Head: Normocephalic and atraumatic.   Cardiovascular:      Rate and Rhythm: Normal rate and regular rhythm.   Pulmonary:      Effort: Pulmonary effort is normal. No respiratory distress.   Abdominal:      General: There is no distension.      Palpations: Abdomen is soft.      Tenderness: There is no abdominal tenderness.   Neurological:      Mental Status: She is alert and oriented to person, place, and time. Mental status is at baseline.         Results Review     I reviewed the patient's new clinical results.  Results from last 7 days   Lab Units 25  0736 25  1234   WBC 10*3/mm3 5.64 5.79   HEMOGLOBIN g/dL 12.2 11.9*   PLATELETS 10*3/mm3 230 231     Results from last 7 days   Lab Units 25  0736 25  1234   SODIUM mmol/L 136 133*   POTASSIUM mmol/L 3.7 4.2   CHLORIDE mmol/L 102 99   CO2 mmol/L 23.1 25.4   BUN mg/dL 8.0 9.0   CREATININE mg/dL 0.48* 0.57   GLUCOSE mg/dL 90 100*   CrCl cannot be calculated (Unknown ideal weight.).  Results from last 7 days   Lab Units 25  0736 25  1234   ALBUMIN g/dL 3.6 3.8   BILIRUBIN mg/dL 0.4 0.3   ALK PHOS U/L 84 89   AST (SGOT) U/L 25 23   ALT (SGPT) U/L 8 9     Results from last 7 days   Lab Units 25  0736 25  1234   CALCIUM mg/dL 9.2 9.6   ALBUMIN g/dL 3.6 3.8  "  MAGNESIUM mg/dL 1.7 1.6*   PHOSPHORUS mg/dL 3.4  --        COVID19   Date Value Ref Range Status   01/08/2022 Detected (C) Not Detected - Ref. Range Final   01/08/2022 Detected (C) Not Detected - Ref. Range Final     No results found for: \"HGBA1C\", \"POCGLU\"  Results for orders placed or performed during the hospital encounter of 01/08/22   Blood Culture - Blood, Arm, Left    Specimen: Arm, Left; Blood   Result Value Ref Range    Blood Culture No growth at 5 days          CT Abdomen Pelvis Without Contrast  Narrative: CT ABDOMEN AND PELVIS WITHOUT IV CONTRAST     HISTORY: Diarrhea, abdominal cramping     TECHNIQUE: Radiation dose reduction techniques were utilized, including  automated exposure control and exposure modulation based on body size.   3 mm images were obtained through the abdomen and pelvis without IV  contrast.     COMPARISON: CT abdomen pelvis 1/8/2022     Impression: FINDINGS AND IMPRESSION:  THERE IS RETICULONODULAR AND GROUNDGLASS OPACIFICATION WITHIN THE  BILATERAL LUNG BASES, ASYMMETRIC PULM OPACIFICATION IN THE MEDIAL ASPECT  OF THE RIGHT LOWER LUNG IS PRESENT, ALSO IN PART SEEN IN 2022. HOWEVER,  THE FINDINGS WITHIN THE REMAINDER OF THE LUNGS APPEARS TO HAVE WORSENED.  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  CORRELATION WITH PATIENT HISTORY RECOMMENDED TO EXCLUDE ATYPICAL  PNEUMONIA. ADDITIONALLY, FOLLOW-UP OF THE PULMONARY NODULARITY WITHIN  THE RIGHT MIDDLE LOBE MEASURING UP TO APPROXIMATE 0.6 CM WITH CHEST CT  IN 6 MONTHS IS RECOMMENDED TO ENSURE STABILITY.     No free intraperitoneal air is seen.     THERE IS SOFT TISSUE THICKENING OVERLYING THE DISTAL COCCYX AND SACRUM  AND CORRELATION WITH PATIENT HISTORY RECOMMENDED TO EXCLUDE DECUBITUS  ULCER. WHILE NO DISCRETE OSTEOLYSIS IS SEEN, OSTEOMYELITIS CANNOT BE  EXCLUDED AND IF THERE IS CLINICAL CONCERN, PLAN IS TO BE FURTHER  EVALUATED WITH " MRI OF THE PELVIS WITH AND WITHOUT CONTRAST IF CLINICALLY  INDICATED.     Small amount free fluid is present in pelvis, nonspecific. The appendix  is unremarkable. Air-fluid levels are present within the large and small  bowel which is otherwise nondistended. No abdominopelvic adenopathy by  size criteria.     Streak artifact from left femoral hardware which is only partially seen  and cannot be evaluated, limits evaluation of the pelvis. Findings of  sacral insufficiency fractures, as before, with interval healing.     MODERATE TO SEVERE INTRAHEPATIC BILI DUCT DILATION IS PRESENT STATUS  POST CHOLECYSTECTOMY, AS BEFORE. THERE IS PNEUMOBILIA.. THERE APPEAR TO  BE 2 DISCRETE HYPERDENSE NODULES WITHIN THE DISTAL COMMON BILE DUCT IN  KEEPING WITH CHOLEDOCHOLITHIASIS. FURTHER EVALUATION WITH ERCP IS  RECOMMENDED. FINDINGS WERE DISCUSSED WITH DR. PEDRAZA   BY TELEPHONE.     Subcentimeter renal lesions are too small to characterize. Larger  hypodense lesions demonstrate density less than 15 Hounsfield units is  likely benign Bosniak 2019 criteria. No hydronephrosis or visualized  renal calculus. The bladder is moderately distended.     The spleen, pancreas and adrenal glands have an unremarkable noncontrast  CT appearance advanced degenerative changes are present throughout the  visualized spine can be further evaluated with MRI if clinically  indicated. For the purposes of this dictation, the last well-formed disc  space referred to as L5-S1. Compression deformity of the L5 vertebral  body is present, as before.                       This report was finalized on 7/6/2025 10:55 PM by Dr. Frandy Camilo M.D  on Workstation: [id number]       Scheduled Medications  aspirin, 81 mg, Oral, Daily  atorvastatin, 40 mg, Oral, Daily  budesonide-formoterol, 2 puff, Inhalation, BID - RT  DULoxetine, 30 mg, Oral, Daily  folic acid, 1 mg, Oral, Daily  lactobacillus acidophilus, 1 capsule, Oral, Daily  latanoprost, 1 drop, Both Eyes,  Nightly  magnesium oxide, 400 mg, Oral, BID  metoprolol tartrate, 50 mg, Oral, Q12H  multivitamin with minerals, 1 tablet, Oral, Daily  pantoprazole, 40 mg, Oral, Daily  Psyllium, 1 packet, Oral, Daily  sodium chloride, 10 mL, Intravenous, Q12H  timolol, 1 drop, Both Eyes, Q12H  traZODone, 50 mg, Oral, Nightly  vitamin B-12, 1,000 mcg, Oral, Daily    Infusions  sodium chloride, 75 mL/hr, Last Rate: 75 mL/hr (07/06/25 1916)    Diet  Diet: Regular/House; Texture: Soft to Chew (NDD 3); Soft to Chew: Whole Meat; Fluid Consistency: Thin (IDDSI 0)       Assessment/Plan     Active Hospital Problems    Diagnosis  POA    **Paroxysmal SVT (supraventricular tachycardia) [I47.10]  Yes      Resolved Hospital Problems   No resolved problems to display.       89 y.o. female admitted with Paroxysmal SVT (supraventricular tachycardia).    PSVT  Hypertension  Started on metoprolol 50 mg twice daily.  Cardiology consulted    Choledocholithiasis  CT scan showed severe dilation of the intra and extrahepatic bile ducts.  LFTs were unremarkable.  GI has been consulted.      Macrocytosis   B12, folate, TSH within normal limits     SCDs for DVT prophylaxis.  Full code.  Discussed with patient, family, and nursing staff.  Anticipate discharge home tomorrow.      Adam Barr MD  [facility]  07/07/25  12:56 EDT

## 2025-07-07 NOTE — THERAPY EVALUATION
Patient Name: Bo Adan  : 10/24/1930    MRN: [id number]                              Today's Date: 2025       Admit Date: 2025    Visit Dx:     ICD-10-CM ICD-9-CM   1. Paroxysmal SVT (supraventricular tachycardia)  I47.10 427.0   2. Diarrhea, unspecified type  R19.7 787.91   3. Choledocholithiasis  K80.50 574.50     Patient Active Problem List   Diagnosis    Anemia    Diarrhea of presumed infectious origin    Essential hypertension    GERD without esophagitis    History of cervical cancer    History of pancreatitis    HLD (hyperlipidemia)    Chest pain    Diarrhea    Enteritis    Hypokalemia    Hypomagnesemia    Occult GI bleeding    Pneumonia of right lower lobe due to methicillin resistant Staphylococcus aureus (MRSA)    Chronic systolic CHF (congestive heart failure)    Severe malnutrition    Paroxysmal SVT (supraventricular tachycardia)     Past Medical History:   Diagnosis Date    Arthritis     Cancer     cervical CA 2018 finished radiation    Chronic systolic CHF (congestive heart failure) 2022    COVID 2021    Elevated cholesterol     Enteritis     GERD (gastroesophageal reflux disease)     Hypertension     Pneumonia 2021     Past Surgical History:   Procedure Laterality Date    CHOLECYSTECTOMY      COLONOSCOPY      ENDOSCOPY      FRACTURE SURGERY      2021      General Information       Row Name 25 1026          Physical Therapy Time and Intention    Document Type evaluation  -EJ     Mode of Treatment co-treatment;physical therapy;occupational therapy;other (see comments)  -EJ       Row Name 25 1026          General Information    Patient Profile Reviewed yes  -EJ     Prior Level of Function min assist:;independent:;all household mobility;ADL's  has caregiver to assist her with ADLs (dressing, bathing) takes herself to bathroom and walks independently. Has a caregiver 4 hours in the morning  -EJ     Existing Precautions/Restrictions fall  -EJ     Barriers to  Rehab none identified  -EJ       Row Name 07/07/25 1026          Living Environment    Current Living Arrangements home  -EJ     People in Home spouse  -EJ       Row Name 07/07/25 1026          Cognition    Orientation Status (Cognition) oriented x 4  -EJ       Row Name 07/07/25 1026          Safety Issues/Impairments Affecting Functional Mobility    Impairments Affecting Function (Mobility) strength;balance;endurance/activity tolerance  -EJ     Comment, Safety Issues/Impairments (Mobility) Co treatment medically appropriate and necessary due to patient acuity level, activity tolerance and safety of patient and staff. Evaluation established to achieve all goals in POC.  -EJ               User Key  (r) = Recorded By, (t) = Taken By, (c) = Cosigned By      Initials Name Provider Type    Radha Encarnacion, PT Physical Therapist                   Mobility       Row Name 07/07/25 1027          Bed Mobility    Supine-Sit Orange (Bed Mobility) standby assist  -EJ     Sit-Supine Orange (Bed Mobility) standby assist  -EJ     Assistive Device (Bed Mobility) head of bed elevated;bed rails  -EJ       Row Name 07/07/25 1027          Sit-Stand Transfer    Sit-Stand Orange (Transfers) contact guard;verbal cues  -EJ     Assistive Device (Sit-Stand Transfers) walker, front-wheeled  -EJ       Row Name 07/07/25 1027          Gait/Stairs (Locomotion)    Orange Level (Gait) verbal cues;contact guard  -EJ     Assistive Device (Gait) walker, front-wheeled  -EJ     Distance in Feet (Gait) 75  -EJ     Deviations/Abnormal Patterns (Gait) lawson decreased;stride length decreased  -EJ     Bilateral Gait Deviations forward flexed posture  -EJ     Comment, (Gait/Stairs) slow pace, but steady  -EJ               User Key  (r) = Recorded By, (t) = Taken By, (c) = Cosigned By      Initials Name Provider Type    Radha Encarnacion, PT Physical Therapist                   Obj/Interventions       Row Name 07/07/25 1030           Range of Motion Comprehensive    General Range of Motion no range of motion deficits identified  -EJ       Row Name 07/07/25 1030          Strength Comprehensive (MMT)    Comment, General Manual Muscle Testing (MMT) Assessment generalized weakness, BLE grossly 4-/5  -EJ       Row Name 07/07/25 1030          Balance    Static Sitting Balance supervision  -EJ     Position, Sitting Balance unsupported;sitting edge of bed  -EJ     Static Standing Balance contact guard  -EJ     Dynamic Standing Balance contact guard  -EJ     Position/Device Used, Standing Balance supported;walker, front-wheeled  -EJ               User Key  (r) = Recorded By, (t) = Taken By, (c) = Cosigned By      Initials Name Provider Type    EJ Radha Mckay, PT Physical Therapist                   Goals/Plan       Row Name 07/07/25 1036          Bed Mobility Goal 1 (PT)    Activity/Assistive Device (Bed Mobility Goal 1, PT) bed mobility activities, all  -EJ     Dexter Level/Cues Needed (Bed Mobility Goal 1, PT) supervision required  -EJ     Time Frame (Bed Mobility Goal 1, PT) 2 weeks  -       Row Name 07/07/25 1036          Transfer Goal 1 (PT)    Activity/Assistive Device (Transfer Goal 1, PT) transfers, all;walker, rolling  -EJ     Dexter Level/Cues Needed (Transfer Goal 1, PT) standby assist  -EJ     Time Frame (Transfer Goal 1, PT) 2 weeks  -[facility] Name 07/07/25 1036          Gait Training Goal 1 (PT)    Activity/Assistive Device (Gait Training Goal 1, PT) gait (walking locomotion);walker, rolling  -EJ     Dexter Level (Gait Training Goal 1, PT) standby assist  -EJ     Distance (Gait Training Goal 1, PT) 150  -EJ     Time Frame (Gait Training Goal 1, PT) 2 weeks  -       Row Name 07/07/25 1036          Therapy Assessment/Plan (PT)    Planned Therapy Interventions (PT) balance training;bed mobility training;gait training;home exercise program;strengthening;stair training;ROM (range of  motion);patient/family education;stretching;transfer training  -EJ               User Key  (r) = Recorded By, (t) = Taken By, (c) = Cosigned By      Initials Name Provider Type    Radha Encarnacion, PT Physical Therapist                   Clinical Impression       Row Name 07/07/25 1031          Pain    Pretreatment Pain Rating 0/10 - no pain  -EJ     Posttreatment Pain Rating 0/10 - no pain  -EJ       Row Name 07/07/25 1031          Plan of Care Review    Plan of Care Reviewed With patient  -EJ     Outcome Evaluation Pt seen for PT eval this AM. She is a 89 [facility]. [facility] w hx of IBS. At baseline, pt lives at home w her  and uses rollator for ambulation. She does have caregivers in the mornings to assist w ADLs as needed. Today, pt agreeable to therapy. SHe presents w generalizsed weakness, slightly impaired balance, and overall decreased functional mobility. Pt able to perform bed mobility w SBA. SHe stood w CGA using Rwx and was able to ambulate approx 75 ft w CGA. SHe demos slow pace. No overt unsteadiness. Pt returned to bed at end of session. She plans home upon DC w assist of caregivers as needed. Pt will continue to benefit from skilled PT to ensure safety and maximize independence w mobility.  -EJ       Row Name 07/07/25 1031          Therapy Assessment/Plan (PT)    Rehab Potential (PT) good  -EJ     Criteria for Skilled Interventions Met (PT) yes  -EJ     Therapy Frequency (PT) 3 times/wk  -EJ       Row Name 07/07/25 1031          Positioning and Restraints    Pre-Treatment Position in bed  -EJ     Post Treatment Position bed  -EJ     In Bed notified nsg;supine;call light within reach;encouraged to call for assist;exit alarm on;with family/caregiver  -EJ               User Key  (r) = Recorded By, (t) = Taken By, (c) = Cosigned By      Initials Name Provider Type    Radha Encarnacion, PT Physical Therapist                   Outcome Measures       Row Name  07/07/25 1037 07/07/25 0050       How much help from another person do you currently need...    Turning from your back to your side while in flat bed without using bedrails? 4  -EJ 3  -MD    Moving from lying on back to sitting on the side of a flat bed without bedrails? 3  -EJ 3  -MD    Moving to and from a bed to a chair (including a wheelchair)? 3  -EJ 2  -MD    Standing up from a chair using your arms (e.g., wheelchair, bedside chair)? 3  -EJ 2  -MD    Climbing 3-5 steps with a railing? 3  -EJ 2  -MD    To walk in hospital room? 3  -EJ 2  -MD    AM-PAC 6 Clicks Score (PT) 19  -EJ 14  -MD              User Key  (r) = Recorded By, (t) = Taken By, (c) = Cosigned By      Initials Name Provider Type    Ludy Gallo, RN Registered Nurse    Radha Encarnacion, PT Physical Therapist                                   PT Recommendation and Plan  Planned Therapy Interventions (PT): balance training, bed mobility training, gait training, home exercise program, strengthening, stair training, ROM (range of motion), patient/family education, stretching, transfer training  Outcome Evaluation: Pt seen for PT eval this AM. She is a 89 y.o. female admitted to [facility] on 7/6/25 w diarrhea w hx of IBS. At baseline, pt lives at home w her  and uses rollator for ambulation. She does have caregivers in the mornings to assist w ADLs as needed. Today, pt agreeable to therapy. SHe presents w generalizsed weakness, slightly impaired balance, and overall decreased functional mobility. Pt able to perform bed mobility w SBA. SHe stood w CGA using Rwx and was able to ambulate approx 75 ft w CGA. SHe demos slow pace. No overt unsteadiness. Pt returned to bed at end of session. She plans home upon DC w assist of caregivers as needed. Pt will continue to benefit from skilled PT to ensure safety and maximize independence w mobility.     Time Calculation:         PT Charges       Row Name 07/07/25 1037             Time Calculation     Start Time 0917  -EJ      Stop Time 0938  -EJ      Time Calculation (min) 21 min  -EJ      PT Received On 07/07/25  -EJ      PT - Next Appointment 07/09/25  -EJ      PT Goal Re-Cert Due Date 07/21/25  -EJ         Time Calculation- PT    Total Timed Code Minutes- PT 16 minute(s)  -EJ                User Key  (r) = Recorded By, (t) = Taken By, (c) = Cosigned By      Initials Name Provider Type     Radha Mckay, PT Physical Therapist                  Therapy Charges for Today       Code Description Service Date Service Provider Modifiers Qty    34774917102  PT EVAL MOD COMPLEXITY 3 7/7/2025 Radha Mckay, PT GP 1    02436983394 HC PT THERAPEUTIC ACT EA 15 MIN 7/7/2025 Radha Mckay, PT GP 1            PT G-Codes  AM-PAC 6 Clicks Score (PT): 19  PT Discharge Summary  Anticipated Discharge Disposition (PT): home with assist, home with home health    Radha Mckay, PT  7/7/2025

## 2025-07-08 VITALS
BODY MASS INDEX: 17.6 KG/M2 | WEIGHT: 90.1 LBS | HEART RATE: 97 BPM | SYSTOLIC BLOOD PRESSURE: 168 MMHG | DIASTOLIC BLOOD PRESSURE: 87 MMHG | RESPIRATION RATE: 18 BRPM | TEMPERATURE: 97.9 F | OXYGEN SATURATION: 98 %

## 2025-07-08 LAB
ANION GAP SERPL CALCULATED.3IONS-SCNC: 12 MMOL/L (ref 5–15)
BUN SERPL-MCNC: 9 MG/DL (ref 8–23)
BUN/CREAT SERPL: 20.9 (ref 7–25)
CALCIUM SPEC-SCNC: 8.5 MG/DL (ref 8.2–9.6)
CHLORIDE SERPL-SCNC: 99 MMOL/L (ref 98–107)
CO2 SERPL-SCNC: 25 MMOL/L (ref 22–29)
CREAT SERPL-MCNC: 0.43 MG/DL (ref 0.57–1)
DEPRECATED RDW RBC AUTO: 45.7 FL (ref 37–54)
EGFRCR SERPLBLD CKD-EPI 2021: 90.3 ML/MIN/1.73
ERYTHROCYTE [DISTWIDTH] IN BLOOD BY AUTOMATED COUNT: 12.3 % (ref 12.3–15.4)
GLUCOSE SERPL-MCNC: 86 MG/DL (ref 65–99)
HCT VFR BLD AUTO: 34.8 % (ref 34–46.6)
HGB BLD-MCNC: 11.7 G/DL (ref 12–15.9)
MCH RBC QN AUTO: 34.1 PG (ref 26.6–33)
MCHC RBC AUTO-ENTMCNC: 33.6 G/DL (ref 31.5–35.7)
MCV RBC AUTO: 101.5 FL (ref 79–97)
PLATELET # BLD AUTO: 223 10*3/MM3 (ref 140–450)
PMV BLD AUTO: 9.9 FL (ref 6–12)
POTASSIUM SERPL-SCNC: 3.1 MMOL/L (ref 3.5–5.2)
RBC # BLD AUTO: 3.43 10*6/MM3 (ref 3.77–5.28)
SODIUM SERPL-SCNC: 136 MMOL/L (ref 136–145)
WBC NRBC COR # BLD AUTO: 7.31 10*3/MM3 (ref 3.4–10.8)

## 2025-07-08 PROCEDURE — 85027 COMPLETE CBC AUTOMATED: CPT | Performed by: STUDENT IN AN ORGANIZED HEALTH CARE EDUCATION/TRAINING PROGRAM

## 2025-07-08 PROCEDURE — 80048 BASIC METABOLIC PNL TOTAL CA: CPT | Performed by: STUDENT IN AN ORGANIZED HEALTH CARE EDUCATION/TRAINING PROGRAM

## 2025-07-08 PROCEDURE — G0378 HOSPITAL OBSERVATION PER HR: HCPCS

## 2025-07-08 PROCEDURE — 94664 DEMO&/EVAL PT USE INHALER: CPT

## 2025-07-08 PROCEDURE — 94799 UNLISTED PULMONARY SVC/PX: CPT

## 2025-07-08 RX ORDER — METOPROLOL TARTRATE 50 MG
50 TABLET ORAL EVERY 12 HOURS SCHEDULED
Qty: 60 TABLET | Refills: 0 | Status: SHIPPED | OUTPATIENT
Start: 2025-07-08

## 2025-07-08 RX ADMIN — ATORVASTATIN CALCIUM 40 MG: 20 TABLET, FILM COATED ORAL at 08:01

## 2025-07-08 RX ADMIN — MAGNESIUM OXIDE TAB 400 MG (240 MG ELEMENTAL MG) 400 MG: 400 (240 MG) TAB at 08:00

## 2025-07-08 RX ADMIN — Medication 1000 MCG: at 08:00

## 2025-07-08 RX ADMIN — Medication 10 ML: at 08:02

## 2025-07-08 RX ADMIN — DULOXETINE 30 MG: 30 CAPSULE, DELAYED RELEASE ORAL at 08:00

## 2025-07-08 RX ADMIN — Medication 1 TABLET: at 08:00

## 2025-07-08 RX ADMIN — TIMOLOL MALEATE 1 DROP: 2.5 SOLUTION OPHTHALMIC at 08:02

## 2025-07-08 RX ADMIN — Medication 1 CAPSULE: at 08:02

## 2025-07-08 RX ADMIN — PANTOPRAZOLE SODIUM 40 MG: 40 TABLET, DELAYED RELEASE ORAL at 08:01

## 2025-07-08 RX ADMIN — BUDESONIDE AND FORMOTEROL FUMARATE DIHYDRATE 2 PUFF: 160; 4.5 AEROSOL RESPIRATORY (INHALATION) at 08:48

## 2025-07-08 RX ADMIN — METOPROLOL TARTRATE 50 MG: 50 TABLET, FILM COATED ORAL at 08:01

## 2025-07-08 RX ADMIN — ASPIRIN 81 MG: 81 TABLET, COATED ORAL at 08:00

## 2025-07-08 RX ADMIN — FOLIC ACID 1 MG: 1 TABLET ORAL at 08:00

## 2025-07-08 NOTE — DISCHARGE SUMMARY
Patient Name: Bo Adan  : 10/24/1930  MRN: [id number]    Date of Admission: 2025  Date of Discharge:  2025  Primary Care Physician: Hernan Cruz MD      Chief Complaint:   Abdominal Pain and Diarrhea      Discharge Diagnoses     Active Hospital Problems    Diagnosis  POA    **Paroxysmal SVT (supraventricular tachycardia) [I47.10]  Yes      Resolved Hospital Problems   No resolved problems to display.        Hospital Course     This is a 89 [place] with a past medical history of IBS with cyclical constipation and diarrhea who presented to hospital with worsening diarrhea.  A CT scan in the emergency department showed significant biliary a degree ductal dilation.  She was noted to have some runs of PSVT in the emergency department and was admitted for further evaluation management.  She was seen in consultation by cardiology.  She had been transition from Coreg to metoprolol which seemed to keep her heart rate under good control.  In addition she was seen in consultation by the hepatobiliary service and there was no need for any endoscopic intervention.  She subsequently discharged home.        Physical Exam:  Temp:  [97.4 °F (36.3 °C)-97.9 °F (36.6 °C)] 97.9 °F (36.6 °C)  Heart Rate:  [71-97] 97  Resp:  [18] 18  BP: (139-168)/(82-87) 168/87  Body mass index is 17.6 kg/m².  Physical Exam  Constitutional:       General: She is not in acute distress.  HENT:      Head: Normocephalic and atraumatic.   Cardiovascular:      Rate and Rhythm: Normal rate and regular rhythm.   Pulmonary:      Effort: Pulmonary effort is normal. No respiratory distress.   Abdominal:      General: There is no distension.      Palpations: Abdomen is soft.      Tenderness: There is no abdominal tenderness.   Neurological:      Mental Status: She is alert and oriented to person, place, and time.         Consultants     Consult Orders (all) (From admission, onward)       Start     Ordered    25 5652  Inpatient  Nutrition Consult  Once        Provider:  (Not yet assigned)    07/06/25 1755 07/06/25 1753  Inpatient Gastroenterology Consult  Once        Specialty:  Gastroenterology  Provider:  Neville Pimentel MD    07/06/25 1755 07/06/25 1752  Inpatient Cardiology Consult  Once        Specialty:  Cardiology  Provider:  Sánchez Kern III, MD    07/06/25 1755 07/06/25 1449  [facility] (on-call MD unless specified) Details  Once,   Status:  Canceled        Specialty:  Hospitalist  Provider:  (Not yet assigned)    07/06/25 1448                  Procedures     Imaging Results (All)       Procedure Component Value Units Date/Time    CT Abdomen Pelvis Without Contrast [[id number]] Collected: 07/06/25 1424     Updated: 07/06/25 2258    Narrative:      CT ABDOMEN AND PELVIS WITHOUT IV CONTRAST     HISTORY: Diarrhea, abdominal cramping     TECHNIQUE: Radiation dose reduction techniques were utilized, including  automated exposure control and exposure modulation based on body size.   3 mm images were obtained through the abdomen and pelvis without IV  contrast.     COMPARISON: CT abdomen pelvis 1/8/2022       Impression:      FINDINGS AND IMPRESSION:  THERE IS RETICULONODULAR AND GROUNDGLASS OPACIFICATION WITHIN THE  BILATERAL LUNG BASES, ASYMMETRIC PULM OPACIFICATION IN THE MEDIAL ASPECT  OF THE RIGHT LOWER LUNG IS PRESENT, ALSO IN PART SEEN IN 2022. HOWEVER,  THE FINDINGS WITHIN THE REMAINDER OF THE LUNGS APPEARS TO HAVE WORSENED.  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  CORRELATION WITH PATIENT HISTORY RECOMMENDED TO EXCLUDE ATYPICAL  PNEUMONIA. ADDITIONALLY, FOLLOW-UP OF THE PULMONARY NODULARITY WITHIN  THE RIGHT MIDDLE LOBE MEASURING UP TO APPROXIMATE 0.6 CM WITH CHEST CT  IN 6 MONTHS IS RECOMMENDED TO ENSURE STABILITY.     No free intraperitoneal air is seen.     THERE IS SOFT TISSUE THICKENING OVERLYING THE DISTAL COCCYX AND  SACRUM  AND CORRELATION WITH PATIENT HISTORY RECOMMENDED TO EXCLUDE DECUBITUS  ULCER. WHILE NO DISCRETE OSTEOLYSIS IS SEEN, OSTEOMYELITIS CANNOT BE  EXCLUDED AND IF THERE IS CLINICAL CONCERN, PLAN IS TO BE FURTHER  EVALUATED WITH MRI OF THE PELVIS WITH AND WITHOUT CONTRAST IF CLINICALLY  INDICATED.     Small amount free fluid is present in pelvis, nonspecific. The appendix  is unremarkable. Air-fluid levels are present within the large and small  bowel which is otherwise nondistended. No abdominopelvic adenopathy by  size criteria.     Streak artifact from left femoral hardware which is only partially seen  and cannot be evaluated, limits evaluation of the pelvis. Findings of  sacral insufficiency fractures, as before, with interval healing.     MODERATE TO SEVERE INTRAHEPATIC BILI DUCT DILATION IS PRESENT STATUS  POST CHOLECYSTECTOMY, AS BEFORE. THERE IS PNEUMOBILIA.. THERE APPEAR TO  BE 2 DISCRETE HYPERDENSE NODULES WITHIN THE DISTAL COMMON BILE DUCT IN  KEEPING WITH CHOLEDOCHOLITHIASIS. FURTHER EVALUATION WITH ERCP IS  RECOMMENDED. FINDINGS WERE DISCUSSED WITH DR. PEDRAZA   BY TELEPHONE.     Subcentimeter renal lesions are too small to characterize. Larger  hypodense lesions demonstrate density less than 15 Hounsfield units is  likely benign Bosniak 2019 criteria. No hydronephrosis or visualized  renal calculus. The bladder is moderately distended.     The spleen, pancreas and adrenal glands have an unremarkable noncontrast  CT appearance advanced degenerative changes are present throughout the  visualized spine can be further evaluated with MRI if clinically  indicated. For the purposes of this dictation, the last well-formed disc  space referred to as L5-S1. Compression deformity of the L5 vertebral  body is present, as before.                       This report was finalized on 7/6/2025 10:55 PM by Dr. Frandy Camilo M.D  on Workstation: [id number]               Pertinent Labs     Results from last 7 days   Lab  "Units 07/08/25 0528 07/07/25  0736 07/06/25  1234   WBC 10*3/mm3 7.31 5.64 5.79   HEMOGLOBIN g/dL 11.7* 12.2 11.9*   PLATELETS 10*3/mm3 223 230 231     Results from last 7 days   Lab Units 07/08/25 0528 07/07/25 0736 07/06/25  1234   SODIUM mmol/L 136 136 133*   POTASSIUM mmol/L 3.1* 3.7 4.2   CHLORIDE mmol/L 99 102 99   CO2 mmol/L 25.0 23.1 25.4   BUN mg/dL 9.0 8.0 9.0   CREATININE mg/dL 0.43* 0.48* 0.57   GLUCOSE mg/dL 86 90 100*   CrCl cannot be calculated (Unknown ideal weight.).  Results from last 7 days   Lab Units 07/07/25 0736 07/06/25  1234   ALBUMIN g/dL 3.6 3.8   BILIRUBIN mg/dL 0.4 0.3   ALK PHOS U/L 84 89   AST (SGOT) U/L 25 23   ALT (SGPT) U/L 8 9     Results from last 7 days   Lab Units 07/08/25 0528 07/07/25 0736 07/06/25  1234   CALCIUM mg/dL 8.5 9.2 9.6   ALBUMIN g/dL  --  3.6 3.8   MAGNESIUM mg/dL  --  1.7 1.6*   PHOSPHORUS mg/dL  --  3.4  --                Invalid input(s): \"LDLCALC\"        Test Results Pending at Discharge       Discharge Details        Discharge Medications        New Medications        Instructions Start Date   magnesium oxide 400 MG tablet  Commonly known as: MAG-OX   400 mg, Oral, Daily      metoprolol tartrate 50 MG tablet  Commonly known as: LOPRESSOR   50 mg, Oral, Every 12 Hours Scheduled             Continue These Medications        Instructions Start Date   acetaminophen 500 MG tablet  Commonly known as: TYLENOL   500 mg, Oral, Every 6 Hours PRN      albuterol sulfate  (90 Base) MCG/ACT inhaler  Commonly known as: PROVENTIL HFA;VENTOLIN HFA;PROAIR HFA   2 puffs, Inhalation, Every 4 Hours PRN      amLODIPine 5 MG tablet  Commonly known as: NORVASC   5 mg, Oral, Daily      aspirin 81 MG EC tablet   81 mg, Oral, Daily      atorvastatin 40 MG tablet  Commonly known as: LIPITOR   40 mg, Oral, Daily      budesonide-formoterol 160-4.5 MCG/ACT inhaler  Commonly known as: SYMBICORT   2 puffs, Inhalation, 2 Times Daily - RT      DULoxetine 20 MG capsule  Commonly " known as: CYMBALTA   30 mg, Oral, Daily, Taken at evening      folic acid 1 MG tablet  Commonly known as: FOLVITE   1 mg, Oral, Daily      hydrocortisone 25 MG suppository  Commonly known as: ANUSOL-HC   25 mg, Rectal, 2 Times Daily      lactobacillus acidophilus capsule capsule   1 capsule, Oral, Daily      latanoprost 0.005 % ophthalmic solution  Commonly known as: XALATAN   1 drop, Nightly      Melatonin 1 MG/4ML liquid   5 mg      multivitamin with minerals tablet tablet   1 tablet, Oral, Daily      ondansetron ODT 4 MG disintegrating tablet  Commonly known as: ZOFRAN-ODT   DISSOLVE 1 TABLET IN MOUTH EVERY 8 HOURS AS NEEDED FOR NAUSEA FOR 7 DAYS      pantoprazole 40 MG EC tablet  Commonly known as: PROTONIX   40 mg, Oral, Daily      timolol 0.25 % ophthalmic solution  Commonly known as: TIMOPTIC   1 drop, 2 Times Daily      traMADol 50 MG tablet  Commonly known as: ULTRAM   50 mg, Oral, Every 8 Hours PRN      traZODone 50 MG tablet  Commonly known as: DESYREL   50 mg, Oral, Nightly      vitamin B-12 1000 MCG tablet  Commonly known as: CYANOCOBALAMIN   1,000 mcg, Oral, Daily             Stop These Medications      carvedilol 25 MG tablet  Commonly known as: COREG     dilTIAZem 30 MG tablet  Commonly known as: CARDIZEM              Allergies   Allergen Reactions    Bactrim [Sulfamethoxazole-Trimethoprim] GI Intolerance    Doxycycline GI Intolerance    Fosamax [Alendronate] GI Intolerance    Lactose Intolerance (Gi) GI Intolerance    Macrodantin [Nitrofurantoin] GI Intolerance    Naproxen GI Intolerance    Zestril [Lisinopril] GI Intolerance         Discharge Disposition:  Home or Self Care    Discharge Diet:  No active diet order      Discharge Activity:       CODE STATUS:    Code Status and Medical Interventions: CPR (Attempt to Resuscitate); Full Support   Ordered at: 07/06/25 3739     Code Status (Patient has no pulse and is not breathing):    CPR (Attempt to Resuscitate)     Medical Interventions (Patient has  pulse or is breathing):    Full Support       No future appointments.  Additional Instructions for the Follow-ups that You Need to Schedule       Ambulatory Referral to Home Health   As directed      Face to Face Visit Date: 7/8/2025   Follow-up provider for Plan of Care?: I treated the patient in an acute care facility and will not continue treatment after discharge.   Follow-up provider: KRISTA MANRIQUEZ [[id number]]   Reason/Clinical Findings: paroxysmal SVT, choledocholithiasis, HTN, skin tear   Describe mobility limitations that make leaving home difficult: Patient requires assistance to travel outside the home   Nursing/Therapeutic Services Requested: Skilled Nursing Physical Therapy Occupational Therapy   Skilled nursing orders: Medication education Wound care dressing/changes   Instructions: Skin tear on LLE -  Xeroform dressing or Vaseline gauze, 4 x 4 gauze on open area in left lower extremity, and wrap Kerlix. Change every other day   PT orders: Therapeutic exercise Gait Training Transfer training Strengthening Home safety assessment   Weight Bearing Status: As Tolerated   Occupational orders: Activities of daily living Energy conservation Strengthening Fine motor Home safety assessment   Frequency: 1 Week 1               Contact information for follow-up providers       Krista Manriquez MD .    Specialty: Family Medicine  Contact information:  [address] [facility] [address]  [phone]                       Contact information for after-discharge care       Home Medical Care       [facility] .    Service: Home Health Services  Contact information:  [address] [place] [address]  [phone]                                   Additional Instructions for the Follow-ups that You Need to Schedule       Ambulatory Referral to Home Health   As directed      Face to Face Visit Date: 7/8/2025   Follow-up provider for Plan of Care?: I  treated the patient in an acute care facility and will not continue treatment after discharge.   Follow-up provider: KRISTA MANRIQUEZ [[id number]]   Reason/Clinical Findings: paroxysmal SVT, choledocholithiasis, HTN, skin tear   Describe mobility limitations that make leaving home difficult: Patient requires assistance to travel outside the home   Nursing/Therapeutic Services Requested: Skilled Nursing Physical Therapy Occupational Therapy   Skilled nursing orders: Medication education Wound care dressing/changes   Instructions: Skin tear on LLE -  Xeroform dressing or Vaseline gauze, 4 x 4 gauze on open area in left lower extremity, and wrap Kerlix. Change every other day   PT orders: Therapeutic exercise Gait Training Transfer training Strengthening Home safety assessment   Weight Bearing Status: As Tolerated   Occupational orders: Activities of daily living Energy conservation Strengthening Fine motor Home safety assessment   Frequency: 1 Week 1            Time Spent on Discharge:  Greater than 30 minutes      Adam Barr MD  [facility]  07/08/25  17:19 EDT

## 2025-07-08 NOTE — PLAN OF CARE
Goal Outcome Evaluation:  Plan of Care Reviewed With: patient        Progress: improving  Outcome Evaluation: Pt is A&OX4, calm and cooperative. Pt is very [place]. Family at bedside. Dsg to [facility] changed yesterday 7/07 due to be changed on 7/09, family educated on dsg changed and instructions provided on AVS. HH to follow as well. Pt con of B&B, with frequency and urgency. DC instructions reviewed with daughter and law who was extremely anxious for pt to DC. [place] being sent home with pt and pts family. Questions encouraged and answered. Follow up appts reviewed with pt, pt to call and make F/U with PCP. Pt able to make needs known.     Pt daughter in law took the AVS form that was signed by pt with her at DC.

## 2025-07-08 NOTE — CASE MANAGEMENT/SOCIAL WORK
Continued Stay Note  [facility]     Patient Name: Bo Adan  MRN: [id number]  Today's Date: 7/8/2025    Admit Date: 7/6/2025    Plan: Home with spouse and part time caregivers, and [facility]   Discharge Plan       Row Name 07/08/25 1014       Plan    Plan Home with spouse and part time caregivers, and [facility]    Plan Comments DC orders noted.  Teto for HH received.  S/W Sun/ [facility] to notify of DC today.  They will followup with pt/ family to arrange first visit.  Pt and her dtrs notified and are in agreement.  Family will transport.    Final Note DC home with spouse and part time caregivers, and [facility]. ........Janice CISNEROS/ ANJUM                   Discharge Codes    No documentation.                 Expected Discharge Date and Time       Expected Discharge Date Expected Discharge Time    Jul 8, 2025               Janice Rogers RN

## 2025-07-08 NOTE — DISCHARGE INSTRUCTIONS
Keep the heels elevated off the bed for pressure reduction  Cleanse, apply vasoline gauze 4 x 4 gauze was placed towards open area in left lower extremity, and wrapped Kerlix, Every other days.

## 2025-07-08 NOTE — CASE MANAGEMENT/SOCIAL WORK
Case Management Discharge Note      Final Note: DC home with spouse and part time caregivers, and [facility] . ........Janice CISNEROS/ ANJUM    Provided Post Acute Provider List?: N/A  Provided Post Acute Provider Quality & Resource List?: N/A    Selected Continued Care - Discharged on 7/8/2025 Admission date: 7/6/2025 - Discharge disposition: Home or Self Care          Home Medical Care       Service Provider Services Address Phone Fax Patient Preferred    [facility] - [facility] [id number]  [place] [address] [phone] --                      Transportation Services  Transportation: Private Transportation  Private: Car    Final Discharge Disposition Code: 06 - home with home health care ([facility] )